# Patient Record
Sex: FEMALE | Race: WHITE | Employment: FULL TIME | URBAN - METROPOLITAN AREA
[De-identification: names, ages, dates, MRNs, and addresses within clinical notes are randomized per-mention and may not be internally consistent; named-entity substitution may affect disease eponyms.]

---

## 2017-06-05 ENCOUNTER — HOSPITAL ENCOUNTER (EMERGENCY)
Age: 42
Discharge: HOME | End: 2017-06-05
Attending: EMERGENCY MEDICINE
Payer: COMMERCIAL

## 2017-06-05 ENCOUNTER — APPOINTMENT (OUTPATIENT)
Dept: GENERAL RADIOLOGY | Age: 42
End: 2017-06-05
Attending: EMERGENCY MEDICINE
Payer: COMMERCIAL

## 2017-06-05 ENCOUNTER — APPOINTMENT (OUTPATIENT)
Dept: CT IMAGING | Age: 42
End: 2017-06-05
Attending: EMERGENCY MEDICINE
Payer: COMMERCIAL

## 2017-06-05 VITALS
BODY MASS INDEX: 29.88 KG/M2 | DIASTOLIC BLOOD PRESSURE: 68 MMHG | OXYGEN SATURATION: 99 % | TEMPERATURE: 98 F | SYSTOLIC BLOOD PRESSURE: 121 MMHG | HEIGHT: 64 IN | HEART RATE: 61 BPM | WEIGHT: 175 LBS | RESPIRATION RATE: 18 BRPM

## 2017-06-05 DIAGNOSIS — R06.02 SHORTNESS OF BREATH: Primary | ICD-10-CM

## 2017-06-05 LAB
ALBUMIN SERPL BCP-MCNC: 3.5 G/DL (ref 3.4–5)
ALBUMIN/GLOB SERPL: 1 {RATIO} (ref 0.8–1.7)
ALP SERPL-CCNC: 63 U/L (ref 45–117)
ALT SERPL-CCNC: 21 U/L (ref 13–56)
ANION GAP BLD CALC-SCNC: 7 MMOL/L (ref 3–18)
AST SERPL W P-5'-P-CCNC: 19 U/L (ref 15–37)
BASOPHILS # BLD AUTO: 0 K/UL (ref 0–0.06)
BASOPHILS # BLD: 0 % (ref 0–2)
BILIRUB SERPL-MCNC: 0.2 MG/DL (ref 0.2–1)
BUN SERPL-MCNC: 15 MG/DL (ref 7–18)
BUN/CREAT SERPL: 19 (ref 12–20)
CALCIUM SERPL-MCNC: 8.3 MG/DL (ref 8.5–10.1)
CHLORIDE SERPL-SCNC: 105 MMOL/L (ref 100–108)
CK MB CFR SERPL CALC: NORMAL % (ref 0–4)
CK MB SERPL-MCNC: <1 NG/ML (ref 5–25)
CK SERPL-CCNC: 98 U/L (ref 26–192)
CO2 SERPL-SCNC: 29 MMOL/L (ref 21–32)
CREAT SERPL-MCNC: 0.8 MG/DL (ref 0.6–1.3)
D DIMER PPP FEU-MCNC: 0.68 UG/ML(FEU)
DIFFERENTIAL METHOD BLD: NORMAL
EOSINOPHIL # BLD: 0.2 K/UL (ref 0–0.4)
EOSINOPHIL NFR BLD: 3 % (ref 0–5)
ERYTHROCYTE [DISTWIDTH] IN BLOOD BY AUTOMATED COUNT: 12.3 % (ref 11.6–14.5)
GLOBULIN SER CALC-MCNC: 3.5 G/DL (ref 2–4)
GLUCOSE SERPL-MCNC: 119 MG/DL (ref 74–99)
HCT VFR BLD AUTO: 36.5 % (ref 35–45)
HGB BLD-MCNC: 12.1 G/DL (ref 12–16)
LYMPHOCYTES # BLD AUTO: 32 % (ref 21–52)
LYMPHOCYTES # BLD: 2.7 K/UL (ref 0.9–3.6)
MCH RBC QN AUTO: 28.2 PG (ref 24–34)
MCHC RBC AUTO-ENTMCNC: 33.2 G/DL (ref 31–37)
MCV RBC AUTO: 85.1 FL (ref 74–97)
MONOCYTES # BLD: 0.5 K/UL (ref 0.05–1.2)
MONOCYTES NFR BLD AUTO: 6 % (ref 3–10)
NEUTS SEG # BLD: 4.9 K/UL (ref 1.8–8)
NEUTS SEG NFR BLD AUTO: 59 % (ref 40–73)
PLATELET # BLD AUTO: 253 K/UL (ref 135–420)
PMV BLD AUTO: 9.2 FL (ref 9.2–11.8)
POTASSIUM SERPL-SCNC: 3.9 MMOL/L (ref 3.5–5.5)
PROT SERPL-MCNC: 7 G/DL (ref 6.4–8.2)
RBC # BLD AUTO: 4.29 M/UL (ref 4.2–5.3)
SODIUM SERPL-SCNC: 141 MMOL/L (ref 136–145)
TROPONIN I SERPL-MCNC: <0.02 NG/ML (ref 0–0.06)
WBC # BLD AUTO: 8.4 K/UL (ref 4.6–13.2)

## 2017-06-05 PROCEDURE — 85025 COMPLETE CBC W/AUTO DIFF WBC: CPT | Performed by: EMERGENCY MEDICINE

## 2017-06-05 PROCEDURE — 96360 HYDRATION IV INFUSION INIT: CPT

## 2017-06-05 PROCEDURE — 93005 ELECTROCARDIOGRAM TRACING: CPT

## 2017-06-05 PROCEDURE — 82550 ASSAY OF CK (CPK): CPT | Performed by: EMERGENCY MEDICINE

## 2017-06-05 PROCEDURE — 99284 EMERGENCY DEPT VISIT MOD MDM: CPT

## 2017-06-05 PROCEDURE — 74011250636 HC RX REV CODE- 250/636: Performed by: EMERGENCY MEDICINE

## 2017-06-05 PROCEDURE — 71275 CT ANGIOGRAPHY CHEST: CPT

## 2017-06-05 PROCEDURE — 74011636320 HC RX REV CODE- 636/320: Performed by: EMERGENCY MEDICINE

## 2017-06-05 PROCEDURE — 71020 XR CHEST PA LAT: CPT

## 2017-06-05 PROCEDURE — 80053 COMPREHEN METABOLIC PANEL: CPT | Performed by: EMERGENCY MEDICINE

## 2017-06-05 PROCEDURE — 85379 FIBRIN DEGRADATION QUANT: CPT | Performed by: EMERGENCY MEDICINE

## 2017-06-05 RX ADMIN — SODIUM CHLORIDE 1000 ML: 900 INJECTION, SOLUTION INTRAVENOUS at 20:04

## 2017-06-05 RX ADMIN — IOPAMIDOL 100 ML: 755 INJECTION, SOLUTION INTRAVENOUS at 20:34

## 2017-06-05 NOTE — ED NOTES
Pt affect calm/conversant with no dsypnea noted; reports she just feels as though she cannot get enough air in.
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
## 2017-06-06 LAB
ATRIAL RATE: 56 BPM
CALCULATED P AXIS, ECG09: 43 DEGREES
CALCULATED R AXIS, ECG10: 13 DEGREES
CALCULATED T AXIS, ECG11: 35 DEGREES
DIAGNOSIS, 93000: NORMAL
P-R INTERVAL, ECG05: 148 MS
Q-T INTERVAL, ECG07: 454 MS
QRS DURATION, ECG06: 86 MS
QTC CALCULATION (BEZET), ECG08: 438 MS
VENTRICULAR RATE, ECG03: 56 BPM

## 2017-06-06 NOTE — DISCHARGE INSTRUCTIONS

## 2017-06-09 ENCOUNTER — OFFICE VISIT (OUTPATIENT)
Dept: FAMILY MEDICINE CLINIC | Age: 42
End: 2017-06-09

## 2017-06-09 VITALS
OXYGEN SATURATION: 98 % | BODY MASS INDEX: 32.1 KG/M2 | TEMPERATURE: 98 F | WEIGHT: 188 LBS | HEIGHT: 64 IN | HEART RATE: 86 BPM | SYSTOLIC BLOOD PRESSURE: 118 MMHG | RESPIRATION RATE: 16 BRPM | DIASTOLIC BLOOD PRESSURE: 84 MMHG

## 2017-06-09 DIAGNOSIS — F41.9 ANXIETY: ICD-10-CM

## 2017-06-09 DIAGNOSIS — M79.7 FIBROMYALGIA: ICD-10-CM

## 2017-06-09 DIAGNOSIS — R06.02 SHORTNESS OF BREATH AT REST: Primary | ICD-10-CM

## 2017-06-09 RX ORDER — DULOXETIN HYDROCHLORIDE 20 MG/1
20 CAPSULE, DELAYED RELEASE ORAL DAILY
Qty: 30 CAP | Refills: 5 | Status: SHIPPED | OUTPATIENT
Start: 2017-06-09 | End: 2017-06-15

## 2017-06-09 RX ORDER — ALPRAZOLAM 1 MG/1
1 TABLET ORAL
Qty: 30 TAB | Refills: 0 | Status: SHIPPED | OUTPATIENT
Start: 2017-06-09 | End: 2019-11-05

## 2017-06-09 NOTE — PROGRESS NOTES
1. Have you been to the ER, urgent care clinic since your last visit? Hospitalized since your last visit? Yes When: 06/08 [facility]    2. Have you seen or consulted any other health care providers outside of the [address] [place] [address] since your last visit? Include any pap smears or colon screening.  No

## 2017-06-09 NOTE — MR AVS SNAPSHOT
Visit Information Date & Time Provider Department Dept. Phone Encounter #  
 6/9/2017  3:00 PM Kylie Conway  [facility] [id number] Upcoming Health Maintenance Date Due  
 PAP AKA CERVICAL CYTOLOGY 4/11/2015 DTaP/Tdap/Td series (1 - Tdap) 7/3/2017* INFLUENZA AGE 9 TO ADULT 8/1/2017 *Topic was postponed. The date shown is not the original due date. Allergies as of 6/9/2017  Review Complete On: 6/9/2017 By: Kylie Conway NP Severity Noted Reaction Type Reactions Adderall [Dextroamphetamine-amphetamine]   Side Effect Other (comments) Sleepy  Headache sick to her stomach Current Immunizations  Never Reviewed No immunizations on file. Not reviewed this visit You Were Diagnosed With   
  
 Codes Comments Fibromyalgia    -  Primary ICD-10-CM: M79.7 ICD-9-CM: 729.1 Anxiety     ICD-10-CM: F41.9 ICD-9-CM: 300.00 Shortness of breath at rest     ICD-10-CM: R06.02 
ICD-9-CM: 786.05 Vitals BP Pulse Temp Resp Height(growth percentile) Weight(growth percentile) 118/84 (BP 1 Location: Left arm, BP Patient Position: Sitting) 86 98 °F (36.7 °C) (Oral) 16 5' 4\" (1.626 m) 188 lb (85.3 kg) LMP SpO2 BMI OB Status Smoking Status 05/29/2017 98% 32.27 kg/m2 Having regular periods Passive Smoke Exposure - Never Smoker BMI and BSA Data Body Mass Index Body Surface Area  
 32.27 kg/m 2 1.96 m 2 Preferred Pharmacy Pharmacy Name Phone [id number] Kaylan Alejo, [facility] [place], [place],[address] [facility] [phone] Your Updated Medication List  
  
   
This list is accurate as of: 6/9/17  3:59 PM.  Always use your most recent med list.  
  
  
  
  
 ALPRAZolam 1 mg tablet Commonly known as:  Pama Nate Take 1 Tab by mouth two (2) times daily as needed for Anxiety. Max Daily Amount: 2 mg.  
  
 buprenorphine-naloxone 8-2 mg Subl  
  
 dicyclomine 20 mg tablet Commonly known as:  BENTYL Take 2 Tabs by mouth four (4) times daily as needed (abdominal cramps). DULoxetine 20 mg capsule Commonly known as:  CYMBALTA Take 1 Cap by mouth daily. Mth-Me Blue-Sod Phos-PhSal-Hyo 118-10-40.8-36 mg Cap capsule Commonly known as:  Gardenia Euler Take 1 Cap by mouth four (4) times daily as needed. promethazine 25 mg tablet Commonly known as:  PHENERGAN Take 1-2 Tabs by mouth every six (6) hours as needed. Prescriptions Printed Refills ALPRAZolam (XANAX) 1 mg tablet 0 Sig: Take 1 Tab by mouth two (2) times daily as needed for Anxiety. Max Daily Amount: 2 mg. Class: Print Route: Oral  
  
Prescriptions Sent to Pharmacy Refills DULoxetine (CYMBALTA) 20 mg capsule 5 Sig: Take 1 Cap by mouth daily. Class: Normal  
 [facility] [address] [place] [address],[address] #: [phone] Route: Oral  
  
We Performed the Following REFERRAL TO CARDIOLOGY [JOA56 Custom] Comments:  
 Please evaluate patient for shortness of breath. Her work up in ER was negative. She has a strong family history of cardiac disease and a history of phentermine use. Thank you for seeing this patient. MILES Mcclellan Referral Information Referral ID Referred By Referred To  
  
 [id number] [facility] ([place], [place]) MD Alex Hanson [address] [facility] [place], [address] [place] [address] Str. Phone: [phone] Fax: [phone] Visits Status Start Date End Date 1 New Request 6/9/17 6/9/18 If your referral has a status of pending review or denied, additional information will be sent to support the outcome of this decision. Introducing [facility]! Dear Courtney John: 
Thank you for requesting a Sprout Social account. Our records indicate that you already have an active Sprout Social account.   You can access your account anytime at [email or web address] Did you know that you can access your hospital and ER discharge instructions at any time in GetMaid? You can also review all of your test results from your hospital stay or ER visit. Additional Information If you have questions, please visit the Frequently Asked Questions section of the GetMaid website at [email or web address] Remember, GetMaid is NOT to be used for urgent needs. For medical emergencies, dial 911. Now available from your iPhone and Android! Please provide this summary of care documentation to your next provider. Your primary care clinician is listed as NONE. If you have any questions after today's visit, please call [phone].

## 2017-06-09 NOTE — PROGRESS NOTES
HISTORY OF PRESENT ILLNESS  José Antonio Quispe is a 37 y.o. female. Pt presents today after ER follow up. She has had 5 days of shortness of breath. She states it is worse in the morning and just very slightly improves during the day. It is present with rest as well as exertion. She denies illness, fever or chills, no cold symptoms, no headache, no nausea or vomiting. She states there is a strong family history of cardiac disease. Shortness of Breath   The history is provided by the patient. This is a new problem. The current episode started more than 1 week ago. The problem has not changed since onset. Pertinent negatives include no fever, no headaches, no rhinorrhea, no sore throat, no swollen glands, no ear pain, no neck pain, no cough, no sputum production, no wheezing, no PND, no chest pain, no vomiting, no rash and no leg swelling. Review of Systems   Constitutional: Negative for chills and fever. HENT: Negative for ear pain, rhinorrhea and sore throat. Eyes: Negative. Respiratory: Positive for shortness of breath. Negative for cough, sputum production and wheezing. Cardiovascular: Negative for chest pain, leg swelling and PND. Gastrointestinal: Negative for vomiting. Musculoskeletal: Negative for neck pain. Skin: Negative for rash. Neurological: Negative. Negative for headaches. Psychiatric/Behavioral: The patient is nervous/anxious. Physical Exam   Constitutional: She is oriented to person, place, and time. She appears well-developed and well-nourished. No distress. HENT:   Head: Normocephalic. Right Ear: External ear normal.   Left Ear: External ear normal.   Nose: Nose normal.   Mouth/Throat: Oropharynx is clear and moist. No oropharyngeal exudate. Eyes: Conjunctivae are normal. Pupils are equal, round, and reactive to light. Neck: Neck supple. No thyromegaly present. Cardiovascular: Normal rate, regular rhythm and normal heart sounds.     No murmur heard.  Pulmonary/Chest: Effort normal and breath sounds normal. No respiratory distress. She has no wheezes. She has no rales. Musculoskeletal: Normal range of motion. She exhibits no edema. Lymphadenopathy:     She has no cervical adenopathy. Neurological: She is alert and oriented to person, place, and time. She has normal reflexes. No cranial nerve deficit. Psychiatric: She has a normal mood and affect. Her behavior is normal. Judgment and thought content normal.       ASSESSMENT and PLAN    ICD-10-CM ICD-9-CM    1. Fibromyalgia M79.7 729.1 DULoxetine (CYMBALTA) 20 mg capsule   2. Anxiety F41.9 300.00 ALPRAZolam (XANAX) 1 mg tablet   3. Shortness of breath at rest R06.02 786.05 REFERRAL TO CARDIOLOGY     PLAN:  We discussed her concerns about cardiac issues. We discussed her pain management and xanax. I explained that she should not be on both of these. I explained tolerance. Pt stated her pain management doctor will not prescribe the xanax to her any more. She takes it TID. She started at 0.25mg and is now at 1mg and states she does not feel it is as effective. I explained that she needs to ween off of this and switch to something else, we discussed treatment options and decided to try Cymbalta as this can treat her anxiety as well as her fibromyalgia. Pt will RTC in 3-4 weeks for med check on the Cymbalta. Pt given after care summary.

## 2017-06-12 ENCOUNTER — TELEPHONE (OUTPATIENT)
Dept: FAMILY MEDICINE CLINIC | Age: 42
End: 2017-06-12

## 2017-06-12 NOTE — TELEPHONE ENCOUNTER
Pt called in and said that she needs a note stating she is ok to return to work. 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 Please advise [phone].

## 2017-06-12 NOTE — LETTER
NOTIFICATION OF RETURN TO WORK / SCHOOL 
 
6/12/2017 10:11 AM 
 
Ms. Dylan Rudolph [address] [facility] [address] [place] To Whom It May Concern: 
 
Dylan Rudolph was under the care of [address] [place] [address] from 06/09/2017. She will be able to return to work/school on 06/12/2017 with no restrictions. If there are questions or concerns please have the patient contact our office. Sincerely, Trudi Diana NP

## 2017-06-13 NOTE — TELEPHONE ENCOUNTER
Patient returned call to office to discuss problems she is having with her medication Cymbalta. On night 1 she slept with no issues. Night 2 not sleeping as well and flushing. Night 3 no sleep complete flushing tremors, nausea. Please advise [phone].

## 2017-06-15 ENCOUNTER — OFFICE VISIT (OUTPATIENT)
Dept: CARDIOLOGY CLINIC | Age: 42
End: 2017-06-15

## 2017-06-15 VITALS
HEIGHT: 64 IN | DIASTOLIC BLOOD PRESSURE: 70 MMHG | BODY MASS INDEX: 32.44 KG/M2 | WEIGHT: 190 LBS | HEART RATE: 87 BPM | SYSTOLIC BLOOD PRESSURE: 120 MMHG | OXYGEN SATURATION: 98 %

## 2017-06-15 DIAGNOSIS — R06.02 SOB (SHORTNESS OF BREATH): Primary | ICD-10-CM

## 2017-06-15 DIAGNOSIS — R06.09 DOE (DYSPNEA ON EXERTION): ICD-10-CM

## 2017-06-15 DIAGNOSIS — Z82.49 FAMILY HISTORY OF EARLY CAD: ICD-10-CM

## 2017-06-15 NOTE — MR AVS SNAPSHOT
Visit Information Date & Time Provider Department Dept. Phone Encounter #  
 6/15/2017  8:20 AM Natalie Millard MD [facility] [id number] Your Appointments 6/30/2017  4:00 PM  
ROUTINE CARE with Carol Acevedo  [facility] Appt Note: 3 wk fu  
 [address] [address] 2 [place] [address] [address] Upcoming Health Maintenance Date Due  
 PAP AKA CERVICAL CYTOLOGY 4/11/2015 DTaP/Tdap/Td series (1 - Tdap) 7/3/2017* INFLUENZA AGE 9 TO ADULT 8/1/2017 *Topic was postponed. The date shown is not the original due date. Allergies as of 6/15/2017  Review Complete On: 6/9/2017 By: Carol Acevedo NP Severity Noted Reaction Type Reactions Adderall [Dextroamphetamine-amphetamine]   Side Effect Other (comments) Sleepy  Headache sick to her stomach Current Immunizations  Never Reviewed No immunizations on file. Not reviewed this visit You Were Diagnosed With   
  
 Codes Comments SOB (shortness of breath)    -  Primary ICD-10-CM: R06.02 
ICD-9-CM: 786.05 Vitals BP Pulse Height(growth percentile) Weight(growth percentile) LMP SpO2  
 120/70 87 5' 4\" (1.626 m) 190 lb (86.2 kg) 05/29/2017 98% BMI OB Status Smoking Status 32.61 kg/m2 Having regular periods Passive Smoke Exposure - Never Smoker Vitals History BMI and BSA Data Body Mass Index Body Surface Area  
 32.61 kg/m 2 1.97 m 2 Preferred Pharmacy Pharmacy Name Phone [id number] Vaughn Dang, [address],[address] [facility] [phone] Your Updated Medication List  
  
   
This list is accurate as of: 6/15/17  9:01 AM.  Always use your most recent med list.  
  
  
  
  
 ALPRAZolam 1 mg tablet Commonly known as:  Isai Fuse Take 1 Tab by mouth two (2) times daily as needed for Anxiety. Max Daily Amount: 2 mg. Mth-Me Blue-Sod Phos-PhSal-Hyo 118-10-40.8-36 mg Cap capsule Commonly known as:  Horald Flash Take 1 Cap by mouth four (4) times daily as needed. promethazine 25 mg tablet Commonly known as:  PHENERGAN Take 1-2 Tabs by mouth every six (6) hours as needed. To-Do List   
 06/15/2017 ECHO:  2D ECHO COMPLETE ADULT (TTE) W OR WO CONTR   
  
 06/15/2017 PFT:  PFT DLCO   
  
 06/15/2017 ECG:  STRESS TEST CARDIAC   
  
 06/22/2017 11:30 AM  
  Appointment with [facility]- CX50 MACHINE (WT ) at [facility] NON-INVASIVE CARD ([phone]) Age Limit for ALL Heart procedures @ all [facility] facilities: 18 yrs and older only. Under the age of 25, refer to [facility] ([phone]). Wt Limit: 350lbs. This study requires patient to bring a written physician's order or MD office may fax the order to Central Scheduling at [phone]. Patient needs to bring a current list of all medications. No preparation is required for this study. Patients should report 15 minutes prior to their appointment time to the [address].     
  
 06/22/2017 12:30 PM  
  Appointment with [facility] NUC CARD ROOM at [facility] NON-INVASIVE CARD ([phone]) Age Limit for ALL Heart procedures @ all [facility] facilities: 18 yrs and older only. Under the age of 25, refer to [facility] ([phone]). PATIENTS SHOULD NOT BRING CHILDREN UNATTENDED TO APPTS. WEIGHT LIMIT:  300 lbs for the treadmill portion of this study. 1-NPO and no caffeine for 4 hours prior to the test 2-No beta blockers or calcium channel blockers day of the test unless specified by cardiology. Please bring with. 3-Patient will be walking/running on a Treadmill.   Patient should wear comfortable shoes that are suitable for walking (NO Sandals/Flip Flops, High Heels, etc.) & comfortable clothing. Please report to the [facility], [address], [address], [place], [place].    
  
 06/29/2017 1:00 PM  
  Appointment with [address] [place] [address] 28 at [address] ([phone]) 1. Do NOT use any inhaled medications 24 hours prior to your breathing test.   2. Do NOT eat a heavy meal or smoke any tobacco products two hours prior to the appointment time. 3. Dress in loose, comfortable clothing. 4. Bring your photo ID, insurance cards and, if provided, the written physician order. 5. Report to the Patient Registration department on the first floor 15-20 minutes prior to your appointment time to check in.   6. If you have questions or need to reschedule, please call [phone]. Referral Information Referral ID Referred By Referred To  
  
 [id number] Delbert ESPARZA Not Available Visits Status Start Date End Date 1 New Request 6/15/17 6/15/18 If your referral has a status of pending review or denied, additional information will be sent to support the outcome of this decision. Introducing [facility] & [facility] SERVICES! Dear Teresa Christine: 
Thank you for requesting a TakeCharge account. Our records indicate that you already have an active TakeCharge account. You can access your account anytime at [email or web address] Did you know that you can access your hospital and ER discharge instructions at any time in TakeCharge? You can also review all of your test results from your hospital stay or ER visit. Additional Information If you have questions, please visit the Frequently Asked Questions section of the TakeCharge website at [email or web address] Remember, TakeCharge is NOT to be used for urgent needs. For medical emergencies, dial 911. Now available from your iPhone and Android! Please provide this summary of care documentation to your next provider. Your primary care clinician is listed as Ga Posada. If you have any questions after today's visit, please call [phone].

## 2017-06-15 NOTE — PROGRESS NOTES
HISTORY OF PRESENT ILLNESS  Susie Mcardle is a 37 y.o. female. HPI    Patient presents for a new office visit. She does not have a prior cardiac history. She does have a strong family history for premature coronary disease in the male family members. The patient was seen in the emergency room 10 days ago for new onset shortness of breath which occurred with physical activity and also occurred early in the morning shortly after she awoke. She underwent a CTA of her thorax which was essentially normal.  She also underwent an EKG and blood work all of which was normal.  She was then seen by her PCP last week who referred here here for further evaluation. Patient denies any overt chest pain or chest pressure. She states her shortness of breath has not significantly changed over the past 10 days, but is still present. She has not noted any orthopnea, PND, or leg swelling. No palpitations, no dizziness, no carie syncope or near syncope. She states that her activity level has been less this year than it was a year ago. Past Medical History:   Diagnosis Date    ADD (attention deficit disorder with hyperactivity)     Anxiety     Colon polyps     Endometriosis     Fibromyalgia     Gall stones     GERD (gastroesophageal reflux disease)     Hx of LASIK     Pancreatitis     S/P endometrial ablation 11/2015    Urinary tract infection, recurrent      Current Outpatient Prescriptions   Medication Sig Dispense Refill    ALPRAZolam (XANAX) 1 mg tablet Take 1 Tab by mouth two (2) times daily as needed for Anxiety. Max Daily Amount: 2 mg. 30 Tab 0    Mth-Me Blue-Sod Phos-PhSal-Hyo (URIBEL) 118-10-40.8-36 mg cap capsule Take 1 Cap by mouth four (4) times daily as needed. 60 Cap 4    promethazine (PHENERGAN) 25 mg tablet Take 1-2 Tabs by mouth every six (6) hours as needed.  20 Tab 0       Allergies   Allergen Reactions    Adderall [Dextroamphetamine-Amphetamine] Other (comments)     Sleepy  Headache sick to her stomach      Social History   Substance Use Topics    Smoking status: Passive Smoke Exposure - Never Smoker    Smokeless tobacco: None    Alcohol use 0.0 oz/week     0 Standard drinks or equivalent per week      Comment: rarely     Family History   Problem Relation Age of Onset    Diabetes Father     Diabetes Mother      prediabetes-diet control    Hypertension Mother      diet controlled    Colon Polyps Paternal Grandmother     Colon Polyps Maternal Grandmother     Heart Attack Maternal Grandfather          Heart Disease Maternal Uncle     Thyroid Disease Maternal Grandmother          Review of Systems   Constitutional: Negative for chills, fever and weight loss. HENT: Negative for nosebleeds. Eyes: Negative for blurred vision and double vision. Respiratory: Positive for shortness of breath. Negative for cough and wheezing. Cardiovascular: Negative for chest pain, palpitations, orthopnea, claudication, leg swelling and PND. Gastrointestinal: Negative for abdominal pain, heartburn, nausea and vomiting. Genitourinary: Negative for dysuria and hematuria. Musculoskeletal: Negative for falls and myalgias. Skin: Negative for rash. Neurological: Negative for dizziness, focal weakness and headaches. Endo/Heme/Allergies: Does not bruise/bleed easily. Psychiatric/Behavioral: Negative for substance abuse. Visit Vitals    /70    Pulse 87    Ht 5' 4\" (1.626 m)    Wt 86.2 kg (190 lb)    LMP 2017    SpO2 98%    BMI 32.61 kg/m2       Physical Exam   Constitutional: She is oriented to person, place, and time. She appears well-developed and well-nourished. HENT:   Head: Normocephalic and atraumatic. Eyes: Conjunctivae are normal.   Neck: Neck supple. No JVD present. Carotid bruit is not present. Cardiovascular: Normal rate, regular rhythm, S1 normal, S2 normal and normal pulses. Exam reveals no gallop. No murmur heard.   Pulmonary/Chest: Effort normal and breath sounds normal. She has no wheezes. She has no rales. Abdominal: Soft. Bowel sounds are normal. There is no tenderness. Musculoskeletal: She exhibits no edema, tenderness or deformity. Neurological: She is alert and oriented to person, place, and time. Skin: Skin is warm and dry. Psychiatric: She has a normal mood and affect. Her behavior is normal. Thought content normal.       ASSESSMENT and PLAN    Dyspnea. Since there is an exertional component, this may be cardiac related given her strong family history for premature CAD. Because of this I recommended an echocardiogram to rule out structural heart disease and also an exercise treadmill stress test for further risk stratification. Lastly, I have recommended pulmonary function testing. If her cardiac testing is unremarkable, no further cardiac workup needed.

## 2017-06-15 NOTE — PROGRESS NOTES
1. Have you been to the ER, urgent care clinic since your last visit? Hospitalized since your last visit? Yes, 6/5/17 for sob     2. Have you seen or consulted any other health care providers outside of the [address] [place] [address] since your last visit? Include any pap smears or colon screening.   No

## 2017-06-16 NOTE — TELEPHONE ENCOUNTER
Pt called back to the office to ask if STEPHEN Nation had sent over another rx for her. Stated that she was advised it might take the trial of a couple different rx to find the right fit for her.    Please advise

## 2017-06-20 ENCOUNTER — TELEPHONE (OUTPATIENT)
Dept: FAMILY MEDICINE CLINIC | Age: 42
End: 2017-06-20

## 2017-06-20 RX ORDER — FLUOXETINE 10 MG/1
10 CAPSULE ORAL DAILY
Qty: 90 CAP | Refills: 1 | Status: SHIPPED | OUTPATIENT
Start: 2017-06-20 | End: 2017-08-09

## 2017-06-20 NOTE — TELEPHONE ENCOUNTER
LMOM stating that avila sent a prescription to pharmacy and to take it at night. I informed her to call and make an appt for 4 weeks.

## 2017-06-22 ENCOUNTER — HOSPITAL ENCOUNTER (OUTPATIENT)
Dept: NON INVASIVE DIAGNOSTICS | Age: 42
Discharge: HOME | End: 2017-06-22
Attending: INTERNAL MEDICINE
Payer: COMMERCIAL

## 2017-06-22 DIAGNOSIS — R06.02 SOB (SHORTNESS OF BREATH): ICD-10-CM

## 2017-06-22 PROCEDURE — 93017 CV STRESS TEST TRACING ONLY: CPT

## 2017-06-22 PROCEDURE — 93306 TTE W/DOPPLER COMPLETE: CPT

## 2017-06-22 NOTE — PROCEDURES
[facility]  STRESS TEST (TREADMILL)    Name:  Catarino Padilla  MR#:  [id number]  :  1975  Account #:  [de-identified]  Date of Adm:  2017  Date of Service:  2017      PROCEDURE: Treadmill exercise stress test.    INDICATIONS: Chest pain and dyspnea. BASELINE ELECTROCARDIOGRAM: Sinus rhythm, normal EKG. PROTOCOL: The patient exercised according to Yohannes protocol for 9  minutes 0 seconds, achieving a work level of 10.1 METS. Resting  heart rate of 80 beats per minute increased to 164 beats per minute,  which is 91% of maximum age-predicted heart rate. Target heart rate  was achieved. Resting blood pressure 120/88 mmHg, increased to  150/80 mmHg. The patient did not have chest pain. ELECTROCARDIOGRAM FINDINGS: No arrhythmias. No changes to  suggest ischemia. IMPRESSION  1. Low risk exercise treadmill stress test.  2. The patient exercised 9 minutes without chest pain, reaching target  heart rate. 3. No electrocardiogram evidence of ischemia. 4. No arrhythmias.         STEVEN Shaver / Napoleon  D:  2017   13:03  T:  2017   14:58  Job #:  [id number]

## 2017-06-23 LAB
ATTENDING PHYSICIAN, CST07: NORMAL
DIAGNOSIS, 93000: NORMAL
DUKE TM SCORE RESULT, CST14: NORMAL
DUKE TREADMILL SCORE, CST13: NORMAL
ECG INTERP BEFORE EX, CST11: NORMAL
ECG INTERP DURING EX, CST12: NORMAL
FUNCTIONAL CAPACITY, CST17: NORMAL
KNOWN CARDIAC CONDITION, CST08: NORMAL
MAX. DIASTOLIC BP, CST04: 80 MMHG
MAX. HEART RATE, CST05: 164 BPM
MAX. SYSTOLIC BP, CST03: 150 MMHG
OVERALL BP RESPONSE TO EXERCISE, CST16: NORMAL
OVERALL HR RESPONSE TO EXERCISE, CST15: NORMAL
PEAK EX METS, CST10: 10.1 METS
PROTOCOL NAME, CST01: NORMAL
TEST INDICATION, CST09: NORMAL

## 2017-06-25 ENCOUNTER — HOSPITAL ENCOUNTER (EMERGENCY)
Age: 42
Discharge: HOME | End: 2017-06-25
Attending: EMERGENCY MEDICINE | Admitting: EMERGENCY MEDICINE
Payer: COMMERCIAL

## 2017-06-25 VITALS
RESPIRATION RATE: 16 BRPM | DIASTOLIC BLOOD PRESSURE: 91 MMHG | HEIGHT: 64 IN | SYSTOLIC BLOOD PRESSURE: 140 MMHG | WEIGHT: 188 LBS | TEMPERATURE: 98 F | HEART RATE: 78 BPM | BODY MASS INDEX: 32.1 KG/M2 | OXYGEN SATURATION: 99 %

## 2017-06-25 DIAGNOSIS — R31.9 URINARY TRACT INFECTION WITH HEMATURIA, SITE UNSPECIFIED: Primary | ICD-10-CM

## 2017-06-25 DIAGNOSIS — N39.0 URINARY TRACT INFECTION WITH HEMATURIA, SITE UNSPECIFIED: Primary | ICD-10-CM

## 2017-06-25 LAB
APPEARANCE UR: ABNORMAL
BACTERIA URNS QL MICRO: ABNORMAL /HPF
BILIRUB UR QL: NEGATIVE
COLOR UR: ABNORMAL
EPITH CASTS URNS QL MICRO: ABNORMAL /LPF (ref 0–5)
GLUCOSE UR STRIP.AUTO-MCNC: NEGATIVE MG/DL
HCG UR QL: NEGATIVE
HGB UR QL STRIP: ABNORMAL
KETONES UR QL STRIP.AUTO: NEGATIVE MG/DL
LEUKOCYTE ESTERASE UR QL STRIP.AUTO: ABNORMAL
NITRITE UR QL STRIP.AUTO: NEGATIVE
PH UR STRIP: 7 [PH] (ref 5–8)
PROT UR STRIP-MCNC: 30 MG/DL
RBC #/AREA URNS HPF: ABNORMAL /HPF (ref 0–5)
SERVICE CMNT-IMP: NORMAL
SP GR UR REFRACTOMETRY: 1 (ref 1–1.03)
UROBILINOGEN UR QL STRIP.AUTO: 0.2 EU/DL (ref 0.2–1)
WBC URNS QL MICRO: ABNORMAL /HPF (ref 0–4)
WET PREP GENITAL: NORMAL

## 2017-06-25 PROCEDURE — 87210 SMEAR WET MOUNT SALINE/INK: CPT | Performed by: EMERGENCY MEDICINE

## 2017-06-25 PROCEDURE — 81025 URINE PREGNANCY TEST: CPT | Performed by: EMERGENCY MEDICINE

## 2017-06-25 PROCEDURE — 87491 CHLMYD TRACH DNA AMP PROBE: CPT | Performed by: EMERGENCY MEDICINE

## 2017-06-25 PROCEDURE — 99284 EMERGENCY DEPT VISIT MOD MDM: CPT

## 2017-06-25 PROCEDURE — 81001 URINALYSIS AUTO W/SCOPE: CPT | Performed by: EMERGENCY MEDICINE

## 2017-06-25 RX ORDER — CIPROFLOXACIN 500 MG/1
500 TABLET ORAL 2 TIMES DAILY
Qty: 14 TAB | Refills: 0 | Status: SHIPPED | OUTPATIENT
Start: 2017-06-25 | End: 2017-07-02

## 2017-06-25 RX ORDER — PROMETHAZINE HYDROCHLORIDE 25 MG/1
25 TABLET ORAL
Qty: 12 TAB | Refills: 0 | Status: SHIPPED | OUTPATIENT
Start: 2017-06-25 | End: 2017-08-24

## 2017-06-25 NOTE — ED TRIAGE NOTES
Reports waking up this morning with pain w urination and vaginal irritation. Denies vaginal discharge.   States she took 1 cipro she had at home PTA

## 2017-06-25 NOTE — DISCHARGE INSTRUCTIONS
Urinary Tract Infection in Women: Care Instructions  Your Care Instructions    A urinary tract infection, or UTI, is a general term for an infection anywhere between the kidneys and the urethra (where urine comes out). Most UTIs are bladder infections. They often cause pain or burning when you urinate. UTIs are caused by bacteria and can be cured with antibiotics. Be sure to complete your treatment so that the infection goes away. Follow-up care is a key part of your treatment and safety. Be sure to make and go to all appointments, and call your doctor if you are having problems. It's also a good idea to know your test results and keep a list of the medicines you take. How can you care for yourself at home? · Take your antibiotics as directed. Do not stop taking them just because you feel better. You need to take the full course of antibiotics. · Drink extra water and other fluids for the next day or two. This may help wash out the bacteria that are causing the infection. (If you have kidney, heart, or liver disease and have to limit fluids, talk with your doctor before you increase your fluid intake.)  · Avoid drinks that are carbonated or have caffeine. They can irritate the bladder. · Urinate often. Try to empty your bladder each time. · To relieve pain, take a hot bath or lay a heating pad set on low over your lower belly or genital area. Never go to sleep with a heating pad in place. To prevent UTIs  · Drink plenty of water each day. This helps you urinate often, which clears bacteria from your system. (If you have kidney, heart, or liver disease and have to limit fluids, talk with your doctor before you increase your fluid intake.)  · Urinate when you need to. · Urinate right after you have sex. · Change sanitary pads often. · Avoid douches, bubble baths, feminine hygiene sprays, and other feminine hygiene products that have deodorants.   · After going to the bathroom, wipe from front to back.  When should you call for help? Call your doctor now or seek immediate medical care if:  · Symptoms such as fever, chills, nausea, or vomiting get worse or appear for the first time. · You have new pain in your back just below your rib cage. This is called flank pain. · There is new blood or pus in your urine. · You have any problems with your antibiotic medicine. Watch closely for changes in your health, and be sure to contact your doctor if:  · You are not getting better after taking an antibiotic for 2 days. · Your symptoms go away but then come back. Where can you learn more? Go to http://marita-venecia.info/. Enter J935 in the search box to learn more about \"Urinary Tract Infection in Women: Care Instructions. \"  Current as of: November 28, 2016  Content Version: 11.3  © 9114-4501 [employer]. Care instructions adapted under license by [employer] (which disclaims liability or warranty for this information). If you have questions about a medical condition or this instruction, always ask your healthcare professional. [place] [address] any warranty or liability for your use of this information. Blood in the Urine: Care Instructions  Your Care Instructions  Blood in the urine, or hematuria, may make the urine look red, brown, or pink. There may be blood every time you urinate or just from time to time. You cannot always see blood in the urine, but it will show up in a urine test.  Blood in the urine may be serious. It should always be checked by a doctor. Your doctor may recommend more tests, including an X-ray, a CT scan, or a cystoscopy (which lets a doctor look inside the urethra and bladder). Blood in the urine can be a sign of another problem. Common causes are bladder infections and kidney stones. An injury to your groin or your genital area can also cause bleeding in the urinary tract.  Very hard exercise--such as running a marathon--can cause blood in the urine. Blood in the urine can also be a sign of kidney disease or cancer in the bladder or kidney. Many cases of blood in the urine are caused by a harmless condition that runs in families. This is called benign familial hematuria. It does not need any treatment. Sometimes your urine may look red or brown even though it does not contain blood. For example, not getting enough fluids (dehydration), taking certain medicines, or having a liver problem can change the color of your urine. Eating foods such as beets, rhubarb, or blackberries or foods with red food coloring can make your urine look red or pink. Follow-up care is a key part of your treatment and safety. Be sure to make and go to all appointments, and call your doctor if you are having problems. It's also a good idea to know your test results and keep a list of the medicines you take. When should you call for help? Call your doctor now or seek immediate medical care if:  · You have symptoms of a urinary infection. For example:  ¨ You have pus in your urine. ¨ You have pain in your back just below your rib cage. This is called flank pain. ¨ You have a fever, chills, or body aches. ¨ It hurts to urinate. ¨ You have groin or belly pain. · You have more blood in your urine. Watch closely for changes in your health, and be sure to contact your doctor if:  · You have new urination problems. · You do not get better as expected. Where can you learn more? Go to http://marita-venecia.info/. Enter R261 in the search box to learn more about \"Blood in the Urine: Care Instructions. \"  Current as of: March 20, 2017  Content Version: 11.3  © 6270-1703 [employer]. Care instructions adapted under license by [employer] (which disclaims liability or warranty for this information).  If you have questions about a medical condition or this instruction, always ask your healthcare professional. [employer] disclaims any warranty or liability for your use of this information.

## 2017-06-25 NOTE — ED NOTES
Kateryna Lincoln is a 37 y.o. female that was discharged in stable condition. The patients diagnosis, condition and treatment were explained to  patient and aftercare instructions were given. The patient verbalized understanding. Patient armband removed and shredded.
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
## 2017-06-26 LAB
C TRACH RRNA SPEC QL NAA+PROBE: NEGATIVE
N GONORRHOEA RRNA SPEC QL NAA+PROBE: NEGATIVE
SPECIMEN SOURCE: NORMAL

## 2017-06-27 NOTE — PROGRESS NOTES
Per last note: Dyspnea. Since there is an exertional component, this may be cardiac related given her strong family history for premature CAD. Because of this I recommended an echocardiogram to rule out structural heart disease and also an exercise treadmill stress test for further risk stratification. Lastly, I have recommended pulmonary function testing. If her cardiac testing is unremarkable, no further cardiac workup needed.

## 2017-07-07 ENCOUNTER — TELEPHONE (OUTPATIENT)
Dept: CARDIOLOGY CLINIC | Age: 42
End: 2017-07-07

## 2017-07-07 NOTE — TELEPHONE ENCOUNTER
----- Message from Mirza Salazar MD sent at 6/29/2017  4:53 PM EDT -----  Please let the patient know that both her stress test and echocardiogram were normal  ----- Message -----     From: Hortencia Bray LPN     Sent: 4/60/3301   1:31 PM       To: Mirza Salazar MD    Per last note: Dyspnea. Since there is an exertional component, this may be cardiac related given her strong family history for premature CAD. Because of this I recommended an echocardiogram to rule out structural heart disease and also an exercise treadmill stress test for further risk stratification. Lastly, I have recommended pulmonary function testing. If her cardiac testing is unremarkable, no further cardiac workup needed.

## 2017-08-06 ENCOUNTER — HOSPITAL ENCOUNTER (INPATIENT)
Age: 42
LOS: 2 days | Discharge: HOME | DRG: 880 | End: 2017-08-09
Attending: EMERGENCY MEDICINE | Admitting: STUDENT IN AN ORGANIZED HEALTH CARE EDUCATION/TRAINING PROGRAM
Payer: COMMERCIAL

## 2017-08-06 DIAGNOSIS — F10.24 ALCOHOL-INDUCED DEPRESSIVE DISORDER WITH MODERATE OR SEVERE USE DISORDER (HCC): Primary | ICD-10-CM

## 2017-08-06 LAB
ALBUMIN SERPL BCP-MCNC: 4.2 G/DL (ref 3.4–5)
ALBUMIN/GLOB SERPL: 0.9 {RATIO} (ref 0.8–1.7)
ALP SERPL-CCNC: 80 U/L (ref 45–117)
ALT SERPL-CCNC: 33 U/L (ref 13–56)
AMPHET UR QL SCN: NEGATIVE
ANION GAP BLD CALC-SCNC: 13 MMOL/L (ref 3–18)
APPEARANCE UR: CLEAR
AST SERPL W P-5'-P-CCNC: 57 U/L (ref 15–37)
BACTERIA URNS QL MICRO: ABNORMAL /HPF
BARBITURATES UR QL SCN: NEGATIVE
BASOPHILS # BLD AUTO: 0 K/UL (ref 0–0.1)
BASOPHILS # BLD: 0 % (ref 0–2)
BENZODIAZ UR QL: POSITIVE
BILIRUB SERPL-MCNC: 0.8 MG/DL (ref 0.2–1)
BILIRUB UR QL: NEGATIVE
BUN SERPL-MCNC: 7 MG/DL (ref 7–18)
BUN/CREAT SERPL: 8 (ref 12–20)
CALCIUM SERPL-MCNC: 8.3 MG/DL (ref 8.5–10.1)
CANNABINOIDS UR QL SCN: NEGATIVE
CHLORIDE SERPL-SCNC: 98 MMOL/L (ref 100–108)
CO2 SERPL-SCNC: 22 MMOL/L (ref 21–32)
COCAINE UR QL SCN: NEGATIVE
COLOR UR: YELLOW
CREAT SERPL-MCNC: 0.83 MG/DL (ref 0.6–1.3)
DIFFERENTIAL METHOD BLD: ABNORMAL
EOSINOPHIL # BLD: 0 K/UL (ref 0–0.4)
EOSINOPHIL NFR BLD: 0 % (ref 0–5)
EPITH CASTS URNS QL MICRO: ABNORMAL /LPF (ref 0–5)
ERYTHROCYTE [DISTWIDTH] IN BLOOD BY AUTOMATED COUNT: 13.9 % (ref 11.6–14.5)
ETHANOL SERPL-MCNC: <3 MG/DL (ref 0–3)
GLOBULIN SER CALC-MCNC: 4.6 G/DL (ref 2–4)
GLUCOSE SERPL-MCNC: 113 MG/DL (ref 74–99)
GLUCOSE UR STRIP.AUTO-MCNC: NEGATIVE MG/DL
HCT VFR BLD AUTO: 40.4 % (ref 35–45)
HDSCOM,HDSCOM: ABNORMAL
HGB BLD-MCNC: 14.4 G/DL (ref 12–16)
HGB UR QL STRIP: ABNORMAL
KETONES UR QL STRIP.AUTO: NEGATIVE MG/DL
LEUKOCYTE ESTERASE UR QL STRIP.AUTO: NEGATIVE
LIPASE SERPL-CCNC: 102 U/L (ref 73–393)
LYMPHOCYTES # BLD AUTO: 15 % (ref 21–52)
LYMPHOCYTES # BLD: 1.5 K/UL (ref 0.9–3.6)
MAGNESIUM SERPL-MCNC: 1.5 MG/DL (ref 1.6–2.6)
MCH RBC QN AUTO: 30.2 PG (ref 24–34)
MCHC RBC AUTO-ENTMCNC: 35.6 G/DL (ref 31–37)
MCV RBC AUTO: 84.7 FL (ref 74–97)
METHADONE UR QL: NEGATIVE
MONOCYTES # BLD: 0.6 K/UL (ref 0.05–1.2)
MONOCYTES NFR BLD AUTO: 6 % (ref 3–10)
MUCOUS THREADS URNS QL MICRO: ABNORMAL /LPF
NEUTS SEG # BLD: 7.9 K/UL (ref 1.8–8)
NEUTS SEG NFR BLD AUTO: 79 % (ref 40–73)
NITRITE UR QL STRIP.AUTO: NEGATIVE
OPIATES UR QL: NEGATIVE
PCP UR QL: NEGATIVE
PH UR STRIP: 6.5 [PH] (ref 5–8)
PLATELET # BLD AUTO: 44 K/UL (ref 135–420)
PLATELET COMMENTS,PCOM: ABNORMAL
PMV BLD AUTO: 10.7 FL (ref 9.2–11.8)
POTASSIUM SERPL-SCNC: 3.7 MMOL/L (ref 3.5–5.5)
PROT SERPL-MCNC: 8.8 G/DL (ref 6.4–8.2)
PROT UR STRIP-MCNC: 300 MG/DL
RBC # BLD AUTO: 4.77 M/UL (ref 4.2–5.3)
RBC #/AREA URNS HPF: ABNORMAL /HPF (ref 0–5)
SODIUM SERPL-SCNC: 133 MMOL/L (ref 136–145)
SP GR UR REFRACTOMETRY: 1.01 (ref 1–1.03)
UROBILINOGEN UR QL STRIP.AUTO: 0.2 EU/DL (ref 0.2–1)
WBC # BLD AUTO: 10 K/UL (ref 4.6–13.2)
WBC URNS QL MICRO: ABNORMAL /HPF (ref 0–4)

## 2017-08-06 PROCEDURE — 99284 EMERGENCY DEPT VISIT MOD MDM: CPT

## 2017-08-06 PROCEDURE — 74011250636 HC RX REV CODE- 250/636: Performed by: EMERGENCY MEDICINE

## 2017-08-06 PROCEDURE — 83735 ASSAY OF MAGNESIUM: CPT | Performed by: EMERGENCY MEDICINE

## 2017-08-06 PROCEDURE — 80053 COMPREHEN METABOLIC PANEL: CPT | Performed by: EMERGENCY MEDICINE

## 2017-08-06 PROCEDURE — 80307 DRUG TEST PRSMV CHEM ANLYZR: CPT

## 2017-08-06 PROCEDURE — 85025 COMPLETE CBC W/AUTO DIFF WBC: CPT | Performed by: EMERGENCY MEDICINE

## 2017-08-06 PROCEDURE — 74011250637 HC RX REV CODE- 250/637: Performed by: EMERGENCY MEDICINE

## 2017-08-06 PROCEDURE — 83690 ASSAY OF LIPASE: CPT | Performed by: EMERGENCY MEDICINE

## 2017-08-06 PROCEDURE — 80307 DRUG TEST PRSMV CHEM ANLYZR: CPT | Performed by: EMERGENCY MEDICINE

## 2017-08-06 PROCEDURE — 81001 URINALYSIS AUTO W/SCOPE: CPT

## 2017-08-06 PROCEDURE — 99285 EMERGENCY DEPT VISIT HI MDM: CPT

## 2017-08-06 RX ORDER — CHLORDIAZEPOXIDE HYDROCHLORIDE 25 MG/1
25 CAPSULE, GELATIN COATED ORAL
Status: COMPLETED | OUTPATIENT
Start: 2017-08-06 | End: 2017-08-06

## 2017-08-06 RX ORDER — LORAZEPAM 2 MG/ML
0.5 INJECTION INTRAMUSCULAR
Status: COMPLETED | OUTPATIENT
Start: 2017-08-06 | End: 2017-08-06

## 2017-08-06 RX ORDER — LORAZEPAM 2 MG/ML
1 INJECTION INTRAMUSCULAR
Status: COMPLETED | OUTPATIENT
Start: 2017-08-06 | End: 2017-08-06

## 2017-08-06 RX ORDER — ONDANSETRON 4 MG/1
4 TABLET, ORALLY DISINTEGRATING ORAL
Status: COMPLETED | OUTPATIENT
Start: 2017-08-06 | End: 2017-08-06

## 2017-08-06 RX ORDER — ONDANSETRON 2 MG/ML
4 INJECTION INTRAMUSCULAR; INTRAVENOUS
Status: COMPLETED | OUTPATIENT
Start: 2017-08-06 | End: 2017-08-06

## 2017-08-06 RX ADMIN — SODIUM CHLORIDE 1000 ML: 900 INJECTION, SOLUTION INTRAVENOUS at 15:40

## 2017-08-06 RX ADMIN — ONDANSETRON 4 MG: 4 TABLET, ORALLY DISINTEGRATING ORAL at 16:22

## 2017-08-06 RX ADMIN — ONDANSETRON 4 MG: 2 INJECTION INTRAMUSCULAR; INTRAVENOUS at 19:45

## 2017-08-06 RX ADMIN — LORAZEPAM 1 MG: 2 INJECTION INTRAMUSCULAR; INTRAVENOUS at 19:45

## 2017-08-06 RX ADMIN — LORAZEPAM 0.5 MG: 2 INJECTION, SOLUTION INTRAMUSCULAR; INTRAVENOUS at 15:40

## 2017-08-06 RX ADMIN — CHLORDIAZEPOXIDE HYDROCHLORIDE 25 MG: 25 CAPSULE ORAL at 17:29

## 2017-08-06 RX ADMIN — LORAZEPAM 1 MG: 2 INJECTION INTRAMUSCULAR; INTRAVENOUS at 21:26

## 2017-08-06 NOTE — IP AVS SNAPSHOT
[address] [place] [address] [place] [place] [address] Patient: Fadumo Sosa MRN: [id number] :1975 You are allergic to the following Allergen Reactions Adderall (Dextroamphetamine-Amphetamine) Other (comments) Sleepy  Headache sick to her stomach Recent Documentation Height Weight BMI OB Status Smoking Status 1.727 m 81.6 kg 27.37 kg/m2 Having regular periods Passive Smoke Exposure - Never Smoker Emergency Contacts Name Discharge Info Relation Home Work Mobile Nolberto Castellano CAREGIVER [4] Spouse [3] [phone] Akila Jennings  Spouse [3] [phone] About your hospitalization You were admitted on:  2017 You last received care in the:  [facility] ADULT CHEM DEP You were discharged on:  2017 Unit phone number:  [phone] Why you were hospitalized Your primary diagnosis was:  Not on File Your diagnoses also included:  Alcohol-Induced Depressive Disorder With Moderate Or Severe Use Disorder (Hcc) Providers Seen During Your Hospitalizations Provider Role Specialty Primary office phone Ava Perez MD Attending Provider Emergency Medicine [phone] Martin Lopes MD Attending Provider Emergency Medicine [phone] Karlee Goetz MD Attending Provider Emergency Medicine [phone] Gricel Stovall MD Attending Provider Psychiatry [phone] Your Primary Care Physician (PCP) Primary Care Physician Office Phone Office Fax [address] [facility] [phone] Follow-up Information Follow up With Details Comments Contact Info Noman Willson NP   16 [facility] [address] [id number] [phone] [address] [facility]. On 2017 at 8:45am with Alan Tinajero. for therapy.  Medication appointment will be made after therapy appointment. , As needed [address] [place] [id number] 
[phone] Current Discharge Medication List  
  
START taking these medications Dose & Instructions Dispensing Information Comments Morning Noon Evening Bedtime  
 nortriptyline 10 mg capsule Commonly known as:  PAMELOR Your last dose was: Your next dose is:    
   
   
 Dose:  10 mg Take 1 Cap by mouth nightly. Indications: generalized anxiety disorder Quantity:  30 Cap Refills:  0 CONTINUE these medications which have CHANGED Dose & Instructions Dispensing Information Comments Morning Noon Evening Bedtime ALPRAZolam 1 mg tablet Commonly known as:  Karthik Hawley What changed:  when to take this Your last dose was: Your next dose is:    
   
   
 Dose:  1 mg Take 1 Tab by mouth two (2) times daily as needed for Anxiety. Max Daily Amount: 2 mg. Quantity:  30 Tab Refills:  0 CONTINUE these medications which have NOT CHANGED Dose & Instructions Dispensing Information Comments Morning Noon Evening Bedtime  
 buprenorphine-naloxone 8-2 mg Subl Your last dose was: Your next dose is:    
   
   
 Dose:  0.75 Tab  
0.75 Tabs by SubLINGual route daily. Refills:  0 Mth-Me Blue-Sod Phos-PhSal-Hyo 118-10-40.8-36 mg Cap capsule Commonly known as:  Janice Cox Your last dose was: Your next dose is:    
   
   
 Dose:  1 Cap Take 1 Cap by mouth four (4) times daily as needed. Quantity:  60 Cap Refills:  4  
     
   
   
   
  
 promethazine 25 mg tablet Commonly known as:  PHENERGAN Your last dose was: Your next dose is:    
   
   
 Dose:  25 mg Take 1 Tab by mouth every six (6) hours as needed. Indications: nausea, vomiting Quantity:  12 Tab Refills:  0 STOP taking these medications FLUoxetine 10 mg capsule Commonly known as:  PROzac Where to Get Your Medications Information on where to get these meds will be given to you by the nurse or doctor. ! Ask your nurse or doctor about these medications  
  nortriptyline 10 mg capsule Discharge Instructions BEHAVIORAL HEALTH NURSING DISCHARGE NOTE Emergency Numbers [facility] Desk: [phone] [facility] Emergency Services: [phone] Suicide Prevention Line: [phone] (TALK) The following personal items collected during your admission are returned to you:  
Dental Appliance: Dental Appliances: None Vision: Visual Aid: None Hearing Aid:   
Jewelry: Jewelry: Bracelet, Earrings, Ring, With patient (wedding rings and earrings in possession) Clothing: Clothing: Footwear, Pajamas, Pants, Shirt, Shorts, Undergarments, With patient (2pr.jeans,5shirts,underwear,2pj.,sneakers,sandals) Other Valuables: Other Valuables: Cell Phone, Personal toiletries, Purse, Other (comment) (adapter in locker, bracelet and earings inside black purse) Valuables sent to safe: Personal Items Sent to Safe:  ($19.20,[employer] cards, drivers license) The discharge information has been reviewed with the patient. The patient verbalized understanding. [employer] Activation Thank you for requesting access to [employer]. Please follow the instructions below to securely access and download your online medical record. [employer] allows you to send messages to your doctor, view your test results, renew your prescriptions, schedule appointments, and more. How Do I Sign Up? In your internet browser, go to [email or web address] Click on the First Time User? Click Here link in the Sign In box. You will be redirect to the New Member Sign Up page. Enter your [employer] Access Code exactly as it appears below. You will not need to use this code after youve completed the sign-up process.  If you do not sign up before the expiration date, you must request a new code. [email or web address] Access Code: Activation code not generated Current [email or web address].[email or web address] Status: Active (This is the date your [email or web address] access code will ) Enter the last four digits of your Social Security Number (xxxx) and Date of Birth (mm/dd/yyyy) as indicated and click Submit. You will be taken to the next sign-up page. Create a niid.to ID. This will be your niid.to login ID and cannot be changed, so think of one that is secure and easy to remember. Create a niid.to password. You can change your password at any time. Enter your Password Reset Question and Answer. This can be used at a later time if you forget your password. Enter your e-mail address. You will receive e-mail notification when new information is available in [address] Click Sign Up. You can now view and download portions of your medical record. Click the Tujia link to download a portable copy of your medical information. Additional Information If you have questions, please visit the Frequently Asked Questions section of the [email or web address] website at [email or web address] Remember, [email or web address] is NOT to be used for urgent needs. For medical emergencies, dial 911. Patient armband removed and shredded Discharge Orders None Introducing [facility] [place] [facility] SERVICES! Dear Nicole Pang: 
Thank you for requesting a niid.to account. Our records indicate that you already have an active niid.to account. You can access your account anytime at [email or web address] Did you know that you can access your hospital and ER discharge instructions at any time in niid.to? You can also review all of your test results from your hospital stay or ER visit. Additional Information If you have questions, please visit the Frequently Asked Questions section of the [email or web address] website at [email or web address] Remember, MyChart is NOT to be used for urgent needs. For medical emergencies, dial 911. Now available from your iPhone and Android! General Information Please provide this summary of care documentation to your next provider. Patient Signature:  ____________________________________________________________ Date:  ____________________________________________________________  
  
Guinevere Coup Provider Signature:  ____________________________________________________________ Date:  ____________________________________________________________

## 2017-08-06 NOTE — ED NOTES
I performed a brief evaluation, including history and physical, of the patient here in triage and I have determined that pt will need further treatment and evaluation from the main side ER physician. I have placed initial orders to help in expediting patients care.      August 06, 2017 at 3:24 PM - Hanh Luciano PA-C        Visit Vitals    BP (!) 156/98 (BP 1 Location: Left arm, BP Patient Position: At rest)    Pulse (!) 121    Temp 98 °F (36.7 °C)    Resp 18    Ht 5' 4\" (1.626 m)    Wt 81.6 kg (180 lb)    SpO2 98%    BMI 30.9 kg/m2

## 2017-08-06 NOTE — ED TRIAGE NOTES
Patient arrived via spouse to receive help to detox off of alcohol. States last drink was around 1415 this afternoon. States she's only been an alcoholic for about 6 weeks due to some personal events that happen this summer. States she's nervous about the process of detox. Denies HI/SI.

## 2017-08-06 NOTE — IP AVS SNAPSHOT
[address] [address] Patient: Jose Nichols MRN: [id number] :1975 Current Discharge Medication List  
  
START taking these medications Dose & Instructions Dispensing Information Comments Morning Noon Evening Bedtime  
 nortriptyline 10 mg capsule Commonly known as:  PAMELOR Your last dose was: Your next dose is:    
   
   
 Dose:  10 mg Take 1 Cap by mouth nightly. Indications: generalized anxiety disorder Quantity:  30 Cap Refills:  0 CONTINUE these medications which have CHANGED Dose & Instructions Dispensing Information Comments Morning Noon Evening Bedtime ALPRAZolam 1 mg tablet Commonly known as:  Lamount Marten What changed:  when to take this Your last dose was: Your next dose is:    
   
   
 Dose:  1 mg Take 1 Tab by mouth two (2) times daily as needed for Anxiety. Max Daily Amount: 2 mg. Quantity:  30 Tab Refills:  0 CONTINUE these medications which have NOT CHANGED Dose & Instructions Dispensing Information Comments Morning Noon Evening Bedtime  
 buprenorphine-naloxone 8-2 mg Subl Your last dose was: Your next dose is:    
   
   
 Dose:  0.75 Tab  
0.75 Tabs by SubLINGual route daily. Refills:  0 Mth-Me Blue-Sod Phos-PhSal-Hyo 118-10-40.8-36 mg Cap capsule Commonly known as:  Torrey Desir Your last dose was: Your next dose is:    
   
   
 Dose:  1 Cap Take 1 Cap by mouth four (4) times daily as needed. Quantity:  60 Cap Refills:  4  
     
   
   
   
  
 promethazine 25 mg tablet Commonly known as:  PHENERGAN Your last dose was: Your next dose is:    
   
   
 Dose:  25 mg Take 1 Tab by mouth every six (6) hours as needed. Indications: nausea, vomiting Quantity:  12 Tab Refills:  0 STOP taking these medications FLUoxetine 10 mg capsule Commonly known as:  PROzac Where to Get Your Medications Information on where to get these meds will be given to you by the nurse or doctor. ! Ask your nurse or doctor about these medications  
  nortriptyline 10 mg capsule
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
## 2017-08-07 PROBLEM — F10.24 ALCOHOL-INDUCED DEPRESSIVE DISORDER WITH MODERATE OR SEVERE USE DISORDER (HCC): Status: ACTIVE | Noted: 2017-08-07

## 2017-08-07 PROCEDURE — 74011250637 HC RX REV CODE- 250/637: Performed by: STUDENT IN AN ORGANIZED HEALTH CARE EDUCATION/TRAINING PROGRAM

## 2017-08-07 PROCEDURE — 65220000003 HC RM SEMIPRIVATE PSYCH

## 2017-08-07 PROCEDURE — 74011250636 HC RX REV CODE- 250/636: Performed by: EMERGENCY MEDICINE

## 2017-08-07 PROCEDURE — 74011250637 HC RX REV CODE- 250/637: Performed by: EMERGENCY MEDICINE

## 2017-08-07 RX ORDER — LORAZEPAM 2 MG/ML
1-2 INJECTION INTRAMUSCULAR
Status: DISCONTINUED | OUTPATIENT
Start: 2017-08-07 | End: 2017-08-09 | Stop reason: HOSPADM

## 2017-08-07 RX ORDER — PROMETHAZINE HYDROCHLORIDE 25 MG/1
25 TABLET ORAL
Status: DISCONTINUED | OUTPATIENT
Start: 2017-08-07 | End: 2017-08-09 | Stop reason: HOSPADM

## 2017-08-07 RX ORDER — FOLIC ACID 1 MG/1
1 TABLET ORAL DAILY
Status: DISCONTINUED | OUTPATIENT
Start: 2017-08-07 | End: 2017-08-09 | Stop reason: HOSPADM

## 2017-08-07 RX ORDER — BUPRENORPHINE HYDROCHLORIDE AND NALOXONE HYDROCHLORIDE DIHYDRATE 8; 2 MG/1; MG/1
0.75 TABLET SUBLINGUAL DAILY
COMMUNITY
End: 2018-07-02

## 2017-08-07 RX ORDER — ALPRAZOLAM 0.5 MG/1
0.5 TABLET ORAL
Status: DISCONTINUED | OUTPATIENT
Start: 2017-08-07 | End: 2017-08-09 | Stop reason: HOSPADM

## 2017-08-07 RX ORDER — HALOPERIDOL 5 MG/ML
5 INJECTION INTRAMUSCULAR
Status: DISCONTINUED | OUTPATIENT
Start: 2017-08-07 | End: 2017-08-09 | Stop reason: HOSPADM

## 2017-08-07 RX ORDER — LORAZEPAM 1 MG/1
1 TABLET ORAL
Status: COMPLETED | OUTPATIENT
Start: 2017-08-07 | End: 2017-08-07

## 2017-08-07 RX ORDER — LOPERAMIDE HYDROCHLORIDE 2 MG/1
2 CAPSULE ORAL AS NEEDED
Status: DISCONTINUED | OUTPATIENT
Start: 2017-08-07 | End: 2017-08-09 | Stop reason: HOSPADM

## 2017-08-07 RX ORDER — BENZTROPINE MESYLATE 1 MG/ML
1-2 INJECTION INTRAMUSCULAR; INTRAVENOUS
Status: DISCONTINUED | OUTPATIENT
Start: 2017-08-07 | End: 2017-08-09 | Stop reason: HOSPADM

## 2017-08-07 RX ORDER — LORAZEPAM 2 MG/ML
1 INJECTION INTRAMUSCULAR
Status: COMPLETED | OUTPATIENT
Start: 2017-08-07 | End: 2017-08-07

## 2017-08-07 RX ORDER — LANOLIN ALCOHOL/MO/W.PET/CERES
100 CREAM (GRAM) TOPICAL DAILY
Status: DISCONTINUED | OUTPATIENT
Start: 2017-08-07 | End: 2017-08-09 | Stop reason: HOSPADM

## 2017-08-07 RX ORDER — THERA TABS 400 MCG
1 TAB ORAL DAILY
Status: DISCONTINUED | OUTPATIENT
Start: 2017-08-07 | End: 2017-08-09 | Stop reason: HOSPADM

## 2017-08-07 RX ORDER — HALOPERIDOL 5 MG/1
5 TABLET ORAL
Status: DISCONTINUED | OUTPATIENT
Start: 2017-08-07 | End: 2017-08-09 | Stop reason: HOSPADM

## 2017-08-07 RX ORDER — IBUPROFEN 600 MG/1
600 TABLET ORAL
Status: DISCONTINUED | OUTPATIENT
Start: 2017-08-07 | End: 2017-08-09 | Stop reason: HOSPADM

## 2017-08-07 RX ORDER — NORTRIPTYLINE HYDROCHLORIDE 10 MG/1
10 CAPSULE ORAL
Status: DISCONTINUED | OUTPATIENT
Start: 2017-08-07 | End: 2017-08-09 | Stop reason: HOSPADM

## 2017-08-07 RX ORDER — TRAZODONE HYDROCHLORIDE 50 MG/1
50 TABLET ORAL
Status: DISCONTINUED | OUTPATIENT
Start: 2017-08-07 | End: 2017-08-09 | Stop reason: HOSPADM

## 2017-08-07 RX ORDER — LORAZEPAM 1 MG/1
1-2 TABLET ORAL
Status: DISCONTINUED | OUTPATIENT
Start: 2017-08-07 | End: 2017-08-07

## 2017-08-07 RX ORDER — ONDANSETRON 2 MG/ML
4 INJECTION INTRAMUSCULAR; INTRAVENOUS
Status: COMPLETED | OUTPATIENT
Start: 2017-08-07 | End: 2017-08-07

## 2017-08-07 RX ORDER — BUPRENORPHINE HYDROCHLORIDE AND NALOXONE HYDROCHLORIDE DIHYDRATE 8; 2 MG/1; MG/1
1 TABLET SUBLINGUAL DAILY
Status: DISCONTINUED | OUTPATIENT
Start: 2017-08-07 | End: 2017-08-09 | Stop reason: HOSPADM

## 2017-08-07 RX ORDER — CHLORDIAZEPOXIDE HYDROCHLORIDE 25 MG/1
25 CAPSULE, GELATIN COATED ORAL
Status: DISCONTINUED | OUTPATIENT
Start: 2017-08-07 | End: 2017-08-09 | Stop reason: HOSPADM

## 2017-08-07 RX ORDER — BENZTROPINE MESYLATE 1 MG/1
1-2 TABLET ORAL
Status: DISCONTINUED | OUTPATIENT
Start: 2017-08-07 | End: 2017-08-09 | Stop reason: HOSPADM

## 2017-08-07 RX ADMIN — LORAZEPAM 1 MG: 2 INJECTION INTRAMUSCULAR; INTRAVENOUS at 07:17

## 2017-08-07 RX ADMIN — PROMETHAZINE HYDROCHLORIDE 25 MG: 25 TABLET ORAL at 12:21

## 2017-08-07 RX ADMIN — CHLORDIAZEPOXIDE HYDROCHLORIDE 25 MG: 25 CAPSULE ORAL at 12:21

## 2017-08-07 RX ADMIN — NORTRIPTYLINE HYDROCHLORIDE 10 MG: 10 CAPSULE ORAL at 20:52

## 2017-08-07 RX ADMIN — TRAZODONE HYDROCHLORIDE 50 MG: 50 TABLET ORAL at 20:52

## 2017-08-07 RX ADMIN — LORAZEPAM 1 MG: 1 TABLET ORAL at 09:55

## 2017-08-07 RX ADMIN — ONDANSETRON 4 MG: 2 INJECTION INTRAMUSCULAR; INTRAVENOUS at 07:17

## 2017-08-07 RX ADMIN — ALPRAZOLAM 0.5 MG: 0.5 TABLET ORAL at 17:22

## 2017-08-07 NOTE — ED NOTES
Bedside shift change report given to Flor WILKINSON (oncoming nurse) by Angeline Rocha (offgoing nurse). Report included the following information SBAR and MAR.

## 2017-08-07 NOTE — BH NOTES
Patient arrived on the unit from the ED escorted by security. Patient states that 6 weeks ago she started drinking heavily after a falling out with her biological father. Patient states that she drinks \"a large bottle of vodka every 3 days. \" Patient states that she posted something on Facebook 6 weeks ago telling her stepfather how grateful she was for him in her life and then her biological father sent her \"hateful letters basically disowning me\". Patient also states that she suffers with anxiety and takes Xanax 1mg TID for her anxiety but after the falling out with biological father she said the Xanax wasn't helping her anxiety. Patient states that she was drinking to get rid of her anxiety and now she is concerned about withdrawal. Patient states that when she googled it she saw that she could die from the withdrawal and she decided she should come to the hospital. Patient states that sh friend of hers \"is a true alcoholic\" and she came here and told patient to do the same. Patient is pleasant on admission and was informed to let nurses know if she has any symptoms of withdrawal. Patient denies SI/HI and AVH and is here strictly to be safe while stopping ETOH.

## 2017-08-07 NOTE — ED NOTES
Pt turned over to me dr Cristine Maria. Pt with alcohol abuse, no medical problmes, pending placement. General; AOX3. Pulmonary; CTA-B. Cardiac: RRR no MRG; Abd S/NT/ND. Plan:  Placement. 7am turned over to dr melendez pending placement.

## 2017-08-07 NOTE — BSMART NOTE
Comprehensive Assessment Form Part 1    Section I - Disposition    The plan is to review with psychiatrist for admission once a bed is available at [facility]. The Medical Doctor, Sahara Gray MD is in agreement with disposition. Section II - Integrated Summary  The information is given by the patient. The Chief Complaint is Etoh detox. The Precipitant Factors are family stressors. Previous Hospitalizations: [facility] AND [facility] [place] 5/2014    Patient is a 37 y.o. female Hx Anxiety and ADD presenting with alcohol problem, requesting detox. 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 She attributes her increased alcohol consumption to her increased levels of stress. Patient states,\"If I drink enough it reduces my anxiety. \" Patient reports she has been craving alcohol and feels nauseous when she doesn't drink. Current medications: Suboxone 6 mg daily and Xanax 1 mg q8hrs PRN. Patient reports detox from pain medications years ago and expresses a fear of dying from [address] detox. Last inpatient [facility] [place] 5/2014 after OD on Ambien. The patient denies SI, HI, and aud/vis hallucinations. The patient's appearance shows no evidence of impairment. The patient's behavior shows poor impulse control. The patient is oriented to time, place, person and situation. The patient's speech is soft. The patient's mood  is depressed and is sad. The range of affect is flat. The patient's thought content  demonstrates no evidence of impairment. The thought process is circumstantial. The patient's memory shows no evidence of impairment. The patient's appetite is decreased and shows signs of weight loss. The patient's sleep has evidence of insomnia. The patient shows little insight. The patient's judgement is impaired. Patient is voluntary for treatment and medicated for symptoms while in ER. No beds currently available at [facility].          Miri Ojeda

## 2017-08-07 NOTE — ED NOTES
TRANSFER - OUT REPORT:    Verbal report given to Rozina Dotson RN(name) on Claire Hogan  [facility] for routine progression of care       Report consisted of patients Situation, Background, Assessment and   Recommendations(SBAR). Information from the following report(s) SBAR, ED Summary and MAR was reviewed with the receiving nurse. Lines:       Opportunity for questions and clarification was provided.       Patient transported with:   [employer]

## 2017-08-07 NOTE — BSMART NOTE
ART THERAPY GROUP PROGRESS NOTE    PATIENT SCHEDULED FOR GROUP AT: 14:15    ATTENDANCE: Full    PARTICIPATION LEVEL: Participates fully in the art process    ATTENTION LEVEL: Able to focus on task    FOCUS: Demetrio 80 and positive thinking patterns     SYMBOLIC & THEMATIC CONTENT AS NOTED IN IMAGERY:  She was calm, compliant, and polite. She was invested in the task at hand and actively participated in group discussion. She shared how her mother's criticism, lack of emotional support, and negative outlook effected her at a young age and how it continues to effect the way she perceives herself. She claimed that she now realizes as an adult that her mother Raul Hernandez not be well herself and struggle with negative thinking,\" and how she is in control of her own thoughts. She was able to identify and challenge negative thoughts and cognitive distortions with positive affirmations through the use of the directive.

## 2017-08-07 NOTE — BH NOTES
GROUP THERAPY PROGRESS NOTE    Michael Green is not participating in Recreational Therapy.      Group time: 30 min    Personal goal for participation: n/a    Goal orientation: social    Group therapy participation: Not present    Therapeutic interventions reviewed and discussed: Positive social interaction and recreation    Impression of participation: not present

## 2017-08-07 NOTE — ED NOTES
7:28 AM : Pt care transferred to Dr. Rizwan An MD, ED provider. History of patient complaint(s), available diagnostic reports and current treatment plan has been discussed thoroughly. Bedside rounding on patient occurred : Yes  Intended disposition of patient : TBD  Pending diagnostics reports and/or labs (please list): Crisis evaluation    Nik Braswell MD      9:05 AM  Awaiting Bed at [facility], will go upstairs when bed is available.
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
## 2017-08-08 PROCEDURE — 74011250636 HC RX REV CODE- 250/636: Performed by: STUDENT IN AN ORGANIZED HEALTH CARE EDUCATION/TRAINING PROGRAM

## 2017-08-08 PROCEDURE — 65220000003 HC RM SEMIPRIVATE PSYCH

## 2017-08-08 PROCEDURE — 74011250637 HC RX REV CODE- 250/637: Performed by: STUDENT IN AN ORGANIZED HEALTH CARE EDUCATION/TRAINING PROGRAM

## 2017-08-08 RX ORDER — BUPRENORPHINE HYDROCHLORIDE AND NALOXONE HYDROCHLORIDE DIHYDRATE 8; 2 MG/1; MG/1
1 TABLET SUBLINGUAL ONCE
Status: COMPLETED | OUTPATIENT
Start: 2017-08-08 | End: 2017-08-08

## 2017-08-08 RX ADMIN — ALPRAZOLAM 0.5 MG: 0.5 TABLET ORAL at 05:24

## 2017-08-08 RX ADMIN — ALPRAZOLAM 0.5 MG: 0.5 TABLET ORAL at 17:03

## 2017-08-08 RX ADMIN — BUPRENORPHINE AND NALOXONE 1 TABLET: 8; 2 TABLET SUBLINGUAL at 08:19

## 2017-08-08 RX ADMIN — IBUPROFEN 600 MG: 600 TABLET, FILM COATED ORAL at 22:18

## 2017-08-08 RX ADMIN — THERA TABS 1 TABLET: TAB at 08:18

## 2017-08-08 RX ADMIN — ALPRAZOLAM 0.5 MG: 0.5 TABLET ORAL at 23:19

## 2017-08-08 RX ADMIN — ALPRAZOLAM 0.5 MG: 0.5 TABLET ORAL at 11:10

## 2017-08-08 RX ADMIN — NORTRIPTYLINE HYDROCHLORIDE 10 MG: 10 CAPSULE ORAL at 20:26

## 2017-08-08 RX ADMIN — TRAZODONE HYDROCHLORIDE 50 MG: 50 TABLET ORAL at 22:18

## 2017-08-08 RX ADMIN — PROMETHAZINE HYDROCHLORIDE 25 MG: 25 TABLET ORAL at 05:24

## 2017-08-08 RX ADMIN — IBUPROFEN 600 MG: 600 TABLET, FILM COATED ORAL at 08:19

## 2017-08-08 RX ADMIN — FOLIC ACID 1 MG: 1 TABLET ORAL at 08:19

## 2017-08-08 RX ADMIN — Medication 100 MG: at 08:19

## 2017-08-08 RX ADMIN — BUPRENORPHINE AND NALOXONE 1 TABLET: 8; 2 TABLET SUBLINGUAL at 12:15
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
## 2017-08-08 NOTE — BH NOTES
Allyssa Zacarias is  participating in Leisure-[employer]ive Group. Group time: 1600    Personal goal for participation:  Positive life changes    Goal orientation: leisure    Group therapy participation: active    Therapeutic interventions reviewed and discussed: Staff encouraged Pt.  To think positive    Impression of participation: Pt.  chose to be positive about life and making positive changes to turn a bad situation into a better  situation

## 2017-08-08 NOTE — BH NOTES
GROUP THERAPY PROGRESS NOTE    Amanda Mayen is participating in [place].      Group time: 30 minutes    Personal goal for participation: discuss guideline compliance, unit issues and community announcements; discuss daily Tx goal(s)                 Goal orientation: community    Group therapy participation: minimal

## 2017-08-08 NOTE — BH NOTES
7890 patient requested and received phenergan for nausea and xanax for anxiety. Patient went back to bed after taking medications. Patient stated the medications were effective.

## 2017-08-08 NOTE — BSMART NOTE
ART THERAPY GROUP PROGRESS NOTE    PATIENT SCHEDULED FOR GROUP AT: 14:15    ATTENDANCE: Full    PARTICIPATION LEVEL: Participates fully in the art process    ATTENTION LEVEL: Able to focus on task    FOCUS: Creative expression     SYMBOLIC & THEMATIC CONTENT AS NOTED IN IMAGERY: She was calm, compliant, and invested in the task at hand. She interacted with group members and offered encouragement. She claimed that she was feeling \"less anxious and more hopeful\" than when she was first admitted.

## 2017-08-08 NOTE — BH NOTES
GROUP THERAPY PROGRESS NOTE    Bree Shaffer is not participating in Recreational Therapy. Group time:45 min    Personal goal for participation: n/a    Goal orientation: social    Group therapy participation: Disinterested    Therapeutic interventions reviewed and discussed: Positive social interaction and recreational activty    Impression of participation: Pt.declined recreational group activity and preferred to keep to self while on unit.

## 2017-08-08 NOTE — BSMART NOTE
SW Contact: Met with pt to review tx plan goals & introduce her to a more expansive view of basic CBT principles. . Also educated her on the role of neurotransmitters & their impact on mood, cognitions & behavior. .. Covered handouts on goal setting & cognitive distortions to review with her DrDank.. OUTPT: She considering other outpt options since she was terminated from [address] [place] [address] for meds. .. Wants therapist there.

## 2017-08-08 NOTE — BH NOTES
Bree Shaffer is  participating in Le[employer]ive Group. Group time: 0875    Personal goal for participation:  Positive life changes    Goal orientation: leisure    Group therapy participation: active    Therapeutic interventions reviewed and discussed: Staff encouraged Pt.  To think positive    Impression of participation: Pt.  chose to be positive about life and making positive changes to turn a bad situation into a better  situation

## 2017-08-08 NOTE — PROGRESS NOTES
Problem: Suicide/Homicide (Adult/Pediatric)  Goal: *STG: Remains safe in hospital  Pt will remain safe daily while hospitalized. Outcome: Progressing Towards Goal  Pt remains safe free from injury  Goal: *STG: Seeks staff when feelings of self harm or harm towards others arise  Pt will contract for safety daily while hospitalized. Outcome: Progressing Towards Goal  Pt denies feelings of harm to self or others, denies A/VH  Goal: *STG/LTG: Complies with medication therapy  Pt will take medications daily while hospitalized. Outcome: Progressing Towards Goal  Pt compliant with medication therapy    Comments:   Patient cooperative, compliant with medications. She is visible in milieu, eats all meals and attends group and activities. Pt interacts with staff and peers. She denies feelings of harm to self or others, denies A/VH. She contracts for safety. Pt states she is not depressed but anxious. Encouraged positive coping strategies to which pt is receptive, and states she does deep breathing exercises, reading or arts coloring activity. Continues to monitor safety and provide support as needed.

## 2017-08-08 NOTE — BH NOTES
Psychiatric Progress Note    Date: 17  Patient Name: Noah Osullivan  : 1975  MRN: [id number]  Hospital Day: 3      INTERVAL HISTORY:   C/o poor sleep, says she is a \"bad sleeper\" and \"probably won't sleep well\" while in hospital. Difficulty falling asleep last night, couldn't stay asleep. Took nortriptyline last night with no issue. No side effects noted. Appetite good, denies nausea. No somatic complaints today. Appears calm, no evidence of tremors/ anxiety. Denies anxiety this morning. Only required 25 mg librium for detox, required xanax 0.5 mg po x3 in past 24 hours. Not suicidal/ homicidal at this time. MENTAL STATUS EXAM:  Appearance: awake, alert, Dressed in casual clothes with fair grooming and hygiene  Behavior: Cooperative with good eye contact  Motor: No psychomotor agitation/retardation  Speech: Normal rate, tone and volume  Mood: \"pretty tired\"  Affect: euthymic, calm, not anxious  Thought Process: linear, goal-directed  Thought Content: Denies SI and HI  Perception: Denies AH or VH  Concentration: fair  Memory: fair  Cognition: Alert and oriented  Insight: Fair  Judgment: Fair    RISK ASSESSMENT:   Prior Attempts: no noted prior  Lethality of Attempts: none noted prior  Current Ideation/Plan: denies  Protective Factors: limited  Future Orientation: limited    ASSESSMENT:   Patient is a 38 yo  female hx of severe, debilitating anxiety and opioid dependence on suboxone with recent alcohol dependence for past 6 weeks, here for anxiety and alcohol detox. Stabilizing well, discharge planning. Diagnoses:  Generalized anxiety disorder  Depressive disorder, unspecified  Alcohol use disorder, severe    Plan:  1. Continue with inpatient psychiatric treatment for containment, stabilization and medication management  2. Patient is to continue with group therapy  3.  Medications:  Librium 25 mg po q4h prn anxiety/ alcohol withdrawal  nortriptyline 10 mg po qhs for anxiety  Suboxone 8-2 mg daily  4. SW to help with disposition- will need psych appointment  5.  Discharge likely 24-48 hours

## 2017-08-09 VITALS
RESPIRATION RATE: 20 BRPM | TEMPERATURE: 97.2 F | OXYGEN SATURATION: 94 % | BODY MASS INDEX: 27.28 KG/M2 | SYSTOLIC BLOOD PRESSURE: 130 MMHG | WEIGHT: 180 LBS | DIASTOLIC BLOOD PRESSURE: 77 MMHG | HEART RATE: 81 BPM | HEIGHT: 68 IN

## 2017-08-09 PROCEDURE — 74011250637 HC RX REV CODE- 250/637: Performed by: STUDENT IN AN ORGANIZED HEALTH CARE EDUCATION/TRAINING PROGRAM

## 2017-08-09 PROCEDURE — 74011250636 HC RX REV CODE- 250/636: Performed by: STUDENT IN AN ORGANIZED HEALTH CARE EDUCATION/TRAINING PROGRAM

## 2017-08-09 RX ORDER — NORTRIPTYLINE HYDROCHLORIDE 10 MG/1
10 CAPSULE ORAL
Qty: 30 CAP | Refills: 0 | Status: ON HOLD | OUTPATIENT
Start: 2017-08-09 | End: 2022-09-15 | Stop reason: DRUGHIGH

## 2017-08-09 RX ADMIN — FOLIC ACID 1 MG: 1 TABLET ORAL at 08:06

## 2017-08-09 RX ADMIN — BUPRENORPHINE AND NALOXONE 1 TABLET: 8; 2 TABLET SUBLINGUAL at 08:06

## 2017-08-09 RX ADMIN — ALPRAZOLAM 0.5 MG: 0.5 TABLET ORAL at 12:19

## 2017-08-09 RX ADMIN — IBUPROFEN 600 MG: 600 TABLET, FILM COATED ORAL at 09:46

## 2017-08-09 RX ADMIN — THERA TABS 1 TABLET: TAB at 08:06

## 2017-08-09 RX ADMIN — Medication 100 MG: at 08:06

## 2017-08-09 RX ADMIN — ALPRAZOLAM 0.5 MG: 0.5 TABLET ORAL at 06:17

## 2017-08-09 NOTE — PROGRESS NOTES
conducted an initial consultation and Spiritual Assessment for Kristyn Durant, who is a 41 y.o.,female. Patients Primary Language is: [place]. According to the patients EMR Episcopal Affiliation is: Pentecostalism.     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 08/07/2017    SOB (shortness of breath) 06/15/2017    Chronic cystitis 09/25/2015    Abdominal pain 06/23/2014    KATI (generalized anxiety disorder) 05/27/2014    Depressive disorder, not elsewhere classified 05/27/2014    Opioid dependence in controlled environment ([facility] 05/27/2014    Fibromyalgia 07/28/2010        The  provided the following Interventions:  Initiated a relationship of care and support. Explored issues of saleem, belief, spirituality. Listened empathically. Offered prayer on patient's behalf. Chart reviewed. The following outcomes where achieved:  Patient processed feelings. Patient expressed gratitude for 's visit. Assessment:  Patient does not have any Christian/cultural needs that will affect patients preferences in health care. There are no spiritual or Christian issues which require intervention at this time. Plan:  Chaplains will continue to follow and will provide pastoral care on an as needed/requested basis.  recommends bedside caregivers page  on duty if patient shows signs of acute spiritual or emotional distress. Baylee Kenny.  [facility] [phone]

## 2017-08-09 NOTE — BH NOTES
GROUP THERAPY PROGRESS NOTE    Abel Alexandre is participating in [employer].      Group time: 30 minutes    Personal goal for participation: discuss guideline compliance, unit issues and community announcements; discuss post discharge plans/goals    Goal orientation: personal    Group therapy participation: active
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
## 2017-08-09 NOTE — BH NOTES
Pt.presented with elevated positive mood while interacting with peers/staff. She also enjoyed visit from family. Staff encouraged her to maintain positive attitude, increase level of participation in group activities, and to seek support if needed. She was receptive, polite, and compliant with medications, meals, hygiene, and unit protocol. She denied experiencing any current suicidal ideations. Staff will continue to monitor and support.

## 2017-08-09 NOTE — BH NOTES
Patient was discharged off unit with her belongings, discharge paperwork and prescriptions. Patient was being picked up by her .

## 2017-08-09 NOTE — DISCHARGE INSTRUCTIONS
BEHAVIORAL HEALTH NURSING DISCHARGE NOTE      Emergency Numbers    : [facility] Emergency Services: [phone]  Suicide Prevention Line: [phone] (TALK)      The following personal items collected during your admission are returned to you:   Dental Appliance: Dental Appliances: None  Vision: Visual Aid: None  Hearing Aid:    Jewelry: Jewelry: Bracelet, Earrings, Ring, With patient (wedding rings and earrings in possession)  Clothing: Clothing: Footwear, Pajamas, Pants, Shirt, Shorts, Undergarments, With patient (2pr.jeans,5shirts,underwear,2pj.,sneakers,sandals)  Other Valuables: Other Valuables: Cell Phone, Personal toiletries, Purse, Other (comment) (adapter in locker, bracelet and earings inside black purse)  Valuables sent to safe: Personal Items Sent to Safe:  ($16.53,[employer] cards, drivers license)        The discharge information has been reviewed with the patient. The patient verbalized understanding. [employer] Activation    Thank you for requesting access to [employer]. Please follow the instructions below to securely access and download your online medical record. [employer] allows you to send messages to your doctor, view your test results, renew your prescriptions, schedule appointments, and more. How Do I Sign Up? In your internet browser, go to [email or web address]  Click on the First Time User? Click Here link in the Sign In box. You will be redirect to the New Member Sign Up page. Enter your [employer] Access Code exactly as it appears below. You will not need to use this code after youve completed the sign-up process. If you do not sign up before the expiration date, you must request a new code. [employer] Access Code: Activation code not generated  Current [employer] Status: Active (This is the date your [employer] access code will )    Enter the last four digits of your Social Security Number (xxxx) and Date of Birth (mm/dd/yyyy) as indicated and click Submit.  You will be taken to the next sign-up page. Create a [employer] ID. This will be your [employer] login ID and cannot be changed, so think of one that is secure and easy to remember. Create a [employer] password. You can change your password at any time. Enter your Password Reset Question and Answer. This can be used at a later time if you forget your password. Enter your e-mail address. You will receive e-mail notification when new information is available in [address] Click Sign Up. You can now view and download portions of your medical record. Click the Provenance link to download a portable copy of your medical information. Additional Information    If you have questions, please visit the Frequently Asked Questions section of the [employer] website at [email or web address] Remember, [employer] is NOT to be used for urgent needs. For medical emergencies, dial 911.     Patient armband removed and shredded

## 2017-08-09 NOTE — PROGRESS NOTES
conducted an initial consultation and Spiritual Assessment for Jose Nichols, who is a 41 y.o.,female. Patients Primary Language is: [place]. According to the patients EMR Mandaeism Affiliation is: Holiness.     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 Explored issues of saleem, belief, spirituality and Oriental orthodox/ritual needs while hospitalized. Listened empathically as patient shared. Provided chaplaincy education. Provided information about Spiritual Care Services. Offered prayer and assurance of continued prayers on patient's behalf. Chart reviewed. The following outcomes where achieved:  Patient shared limited information about both their medical narrative and spiritual journey/beliefs. Patient processed feeling about current hospitalization. Patient expressed gratitude for 's visit. Plan:  Chaplains will continue to follow and will provide pastoral care on an as needed/requested basis.  recommends bedside caregivers page  on duty if patient shows signs of acute spiritual or emotional distress.       Merna Zimmerman  [address] [place] [address]  [phone]

## 2017-08-24 ENCOUNTER — HOSPITAL ENCOUNTER (EMERGENCY)
Age: 42
Discharge: HOME | End: 2017-08-24
Attending: EMERGENCY MEDICINE | Admitting: EMERGENCY MEDICINE
Payer: COMMERCIAL

## 2017-08-24 VITALS
DIASTOLIC BLOOD PRESSURE: 85 MMHG | OXYGEN SATURATION: 99 % | TEMPERATURE: 98 F | BODY MASS INDEX: 32.44 KG/M2 | HEART RATE: 81 BPM | WEIGHT: 190 LBS | RESPIRATION RATE: 16 BRPM | SYSTOLIC BLOOD PRESSURE: 142 MMHG | HEIGHT: 64 IN

## 2017-08-24 DIAGNOSIS — N39.0 URINARY TRACT INFECTION WITH HEMATURIA, SITE UNSPECIFIED: Primary | ICD-10-CM

## 2017-08-24 DIAGNOSIS — R31.9 URINARY TRACT INFECTION WITH HEMATURIA, SITE UNSPECIFIED: Primary | ICD-10-CM

## 2017-08-24 LAB
APPEARANCE UR: CLEAR
BACTERIA URNS QL MICRO: ABNORMAL /HPF
BILIRUB UR QL: NEGATIVE
COLOR UR: YELLOW
EPITH CASTS URNS QL MICRO: ABNORMAL /LPF (ref 0–5)
GLUCOSE UR STRIP.AUTO-MCNC: NEGATIVE MG/DL
HCG UR QL: NEGATIVE
HGB UR QL STRIP: ABNORMAL
KETONES UR QL STRIP.AUTO: NEGATIVE MG/DL
LEUKOCYTE ESTERASE UR QL STRIP.AUTO: ABNORMAL
NITRITE UR QL STRIP.AUTO: NEGATIVE
PH UR STRIP: 6.5 [PH] (ref 5–8)
PROT UR STRIP-MCNC: NEGATIVE MG/DL
RBC #/AREA URNS HPF: ABNORMAL /HPF (ref 0–5)
SP GR UR REFRACTOMETRY: 1.01 (ref 1–1.03)
UROBILINOGEN UR QL STRIP.AUTO: 0.2 EU/DL (ref 0.2–1)
WBC URNS QL MICRO: ABNORMAL /HPF (ref 0–4)

## 2017-08-24 PROCEDURE — 81025 URINE PREGNANCY TEST: CPT | Performed by: EMERGENCY MEDICINE

## 2017-08-24 PROCEDURE — 99283 EMERGENCY DEPT VISIT LOW MDM: CPT

## 2017-08-24 PROCEDURE — 87086 URINE CULTURE/COLONY COUNT: CPT | Performed by: EMERGENCY MEDICINE

## 2017-08-24 PROCEDURE — 81001 URINALYSIS AUTO W/SCOPE: CPT | Performed by: EMERGENCY MEDICINE

## 2017-08-24 RX ORDER — NITROFURANTOIN 25; 75 MG/1; MG/1
100 CAPSULE ORAL 2 TIMES DAILY
Qty: 14 CAP | Refills: 0 | Status: SHIPPED | OUTPATIENT
Start: 2017-08-24 | End: 2017-08-31

## 2017-08-24 RX ORDER — PROMETHAZINE HYDROCHLORIDE 25 MG/1
25 TABLET ORAL
Qty: 12 TAB | Refills: 0 | Status: SHIPPED | OUTPATIENT
Start: 2017-08-24 | End: 2017-12-17

## 2017-08-24 NOTE — ED TRIAGE NOTES
Patient presents to ER C/O bladder infection, states the pain is the same as 2 months ago. States it awoke her from sleep at 3:30 am (2 hours ago).

## 2017-08-24 NOTE — ED NOTES
I have reviewed discharge instructions with the patient. The patient verbalized understanding. Medication teaching given, to include name, dose, action, and side effects. Patient verbalized understanding of medications. Encouraged patient to voice any concerns with reassurance provided. Instructed not to drive or use heavy machinery while taking this medication. Patient armband removed and shredded    Patient Discharged in stable condition. Patient is awake, alert and oriented x 4.

## 2017-08-24 NOTE — DISCHARGE INSTRUCTIONS
Return for pain, fever, shortness of breath, vomiting, decreased fluid intake, weakness, numbness, dizziness, or any change or concerns. Urinary Tract Infection in Women: Care Instructions  Your Care Instructions    A urinary tract infection, or UTI, is a general term for an infection anywhere between the kidneys and the urethra (where urine comes out). Most UTIs are bladder infections. They often cause pain or burning when you urinate. UTIs are caused by bacteria and can be cured with antibiotics. Be sure to complete your treatment so that the infection goes away. Follow-up care is a key part of your treatment and safety. Be sure to make and go to all appointments, and call your doctor if you are having problems. It's also a good idea to know your test results and keep a list of the medicines you take. How can you care for yourself at home? · Take your antibiotics as directed. Do not stop taking them just because you feel better. You need to take the full course of antibiotics. · Drink extra water and other fluids for the next day or two. This may help wash out the bacteria that are causing the infection. (If you have kidney, heart, or liver disease and have to limit fluids, talk with your doctor before you increase your fluid intake.)  · Avoid drinks that are carbonated or have caffeine. They can irritate the bladder. · Urinate often. Try to empty your bladder each time. · To relieve pain, take a hot bath or lay a heating pad set on low over your lower belly or genital area. Never go to sleep with a heating pad in place. To prevent UTIs  · Drink plenty of water each day. This helps you urinate often, which clears bacteria from your system. (If you have kidney, heart, or liver disease and have to limit fluids, talk with your doctor before you increase your fluid intake.)  · Urinate when you need to. · Urinate right after you have sex. · Change sanitary pads often.   · Avoid douches, bubble baths, feminine hygiene sprays, and other feminine hygiene products that have deodorants. · After going to the bathroom, wipe from front to back. When should you call for help? Call your doctor now or seek immediate medical care if:  · Symptoms such as fever, chills, nausea, or vomiting get worse or appear for the first time. · You have new pain in your back just below your rib cage. This is called flank pain. · There is new blood or pus in your urine. · You have any problems with your antibiotic medicine. Watch closely for changes in your health, and be sure to contact your doctor if:  · You are not getting better after taking an antibiotic for 2 days. · Your symptoms go away but then come back. Where can you learn more? Go to http://marita-venecia.info/. Enter A257 in the search box to learn more about \"Urinary Tract Infection in Women: Care Instructions. \"  Current as of: November 28, 2016  Content Version: 11.3  © 2461-7147 [employer]. Care instructions adapted under license by [employer] (which disclaims liability or warranty for this information). If you have questions about a medical condition or this instruction, always ask your healthcare professional. [address] any warranty or liability for your use of this information.
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
## 2017-08-26 LAB
BACTERIA SPEC CULT: NORMAL
SERVICE CMNT-IMP: NORMAL

## 2017-12-17 ENCOUNTER — HOSPITAL ENCOUNTER (EMERGENCY)
Age: 42
Discharge: HOME | End: 2017-12-17
Attending: EMERGENCY MEDICINE
Payer: COMMERCIAL

## 2017-12-17 VITALS
BODY MASS INDEX: 33.29 KG/M2 | HEART RATE: 121 BPM | RESPIRATION RATE: 18 BRPM | OXYGEN SATURATION: 97 % | TEMPERATURE: 98.5 F | SYSTOLIC BLOOD PRESSURE: 140 MMHG | WEIGHT: 195 LBS | DIASTOLIC BLOOD PRESSURE: 80 MMHG | HEIGHT: 64 IN

## 2017-12-17 DIAGNOSIS — R11.15 NON-INTRACTABLE CYCLICAL VOMITING WITH NAUSEA: Primary | ICD-10-CM

## 2017-12-17 LAB
ALBUMIN SERPL-MCNC: 4.4 G/DL (ref 3.4–5)
ALBUMIN/GLOB SERPL: 1 {RATIO} (ref 0.8–1.7)
ALP SERPL-CCNC: 115 U/L (ref 45–117)
ALT SERPL-CCNC: 155 U/L (ref 13–56)
ANION GAP SERPL CALC-SCNC: 12 MMOL/L (ref 3–18)
AST SERPL-CCNC: 115 U/L (ref 15–37)
BASOPHILS # BLD: 0 K/UL (ref 0–0.06)
BASOPHILS NFR BLD: 0 % (ref 0–2)
BILIRUB SERPL-MCNC: 1 MG/DL (ref 0.2–1)
BUN SERPL-MCNC: 12 MG/DL (ref 7–18)
BUN/CREAT SERPL: 15 (ref 12–20)
CALCIUM SERPL-MCNC: 9.8 MG/DL (ref 8.5–10.1)
CHLORIDE SERPL-SCNC: 102 MMOL/L (ref 100–108)
CO2 SERPL-SCNC: 28 MMOL/L (ref 21–32)
CREAT SERPL-MCNC: 0.78 MG/DL (ref 0.6–1.3)
DIFFERENTIAL METHOD BLD: ABNORMAL
EOSINOPHIL # BLD: 0 K/UL (ref 0–0.4)
EOSINOPHIL NFR BLD: 0 % (ref 0–5)
ERYTHROCYTE [DISTWIDTH] IN BLOOD BY AUTOMATED COUNT: 12.3 % (ref 11.6–14.5)
GLOBULIN SER CALC-MCNC: 4.3 G/DL (ref 2–4)
GLUCOSE SERPL-MCNC: 133 MG/DL (ref 74–99)
HCG UR QL: NEGATIVE
HCT VFR BLD AUTO: 42.3 % (ref 35–45)
HGB BLD-MCNC: 13.9 G/DL (ref 12–16)
LYMPHOCYTES # BLD: 0.8 K/UL (ref 0.9–3.6)
LYMPHOCYTES NFR BLD: 8 % (ref 21–52)
MCH RBC QN AUTO: 27.4 PG (ref 24–34)
MCHC RBC AUTO-ENTMCNC: 32.9 G/DL (ref 31–37)
MCV RBC AUTO: 83.4 FL (ref 74–97)
MONOCYTES # BLD: 0.3 K/UL (ref 0.05–1.2)
MONOCYTES NFR BLD: 3 % (ref 3–10)
NEUTS SEG # BLD: 8.4 K/UL (ref 1.8–8)
NEUTS SEG NFR BLD: 89 % (ref 40–73)
PLATELET # BLD AUTO: 315 K/UL (ref 135–420)
PMV BLD AUTO: 9.2 FL (ref 9.2–11.8)
POTASSIUM SERPL-SCNC: 3.8 MMOL/L (ref 3.5–5.5)
PROT SERPL-MCNC: 8.7 G/DL (ref 6.4–8.2)
RBC # BLD AUTO: 5.07 M/UL (ref 4.2–5.3)
SODIUM SERPL-SCNC: 142 MMOL/L (ref 136–145)
WBC # BLD AUTO: 9.5 K/UL (ref 4.6–13.2)

## 2017-12-17 PROCEDURE — 81025 URINE PREGNANCY TEST: CPT | Performed by: EMERGENCY MEDICINE

## 2017-12-17 PROCEDURE — 74011250636 HC RX REV CODE- 250/636: Performed by: EMERGENCY MEDICINE

## 2017-12-17 PROCEDURE — 85025 COMPLETE CBC W/AUTO DIFF WBC: CPT | Performed by: EMERGENCY MEDICINE

## 2017-12-17 PROCEDURE — 80053 COMPREHEN METABOLIC PANEL: CPT | Performed by: EMERGENCY MEDICINE

## 2017-12-17 PROCEDURE — 99282 EMERGENCY DEPT VISIT SF MDM: CPT

## 2017-12-17 PROCEDURE — 96361 HYDRATE IV INFUSION ADD-ON: CPT

## 2017-12-17 PROCEDURE — 96374 THER/PROPH/DIAG INJ IV PUSH: CPT

## 2017-12-17 RX ORDER — ONDANSETRON 2 MG/ML
4 INJECTION INTRAMUSCULAR; INTRAVENOUS
Status: DISCONTINUED | OUTPATIENT
Start: 2017-12-17 | End: 2017-12-17 | Stop reason: SDUPTHER

## 2017-12-17 RX ORDER — ONDANSETRON 2 MG/ML
INJECTION INTRAMUSCULAR; INTRAVENOUS
Status: DISPENSED
Start: 2017-12-17 | End: 2017-12-17

## 2017-12-17 RX ORDER — ONDANSETRON 4 MG/1
4 TABLET, FILM COATED ORAL
Qty: 12 TAB | Refills: 0 | Status: SHIPPED | OUTPATIENT
Start: 2017-12-17 | End: 2018-07-02

## 2017-12-17 RX ORDER — ONDANSETRON 2 MG/ML
4 INJECTION INTRAMUSCULAR; INTRAVENOUS
Status: COMPLETED | OUTPATIENT
Start: 2017-12-17 | End: 2017-12-17

## 2017-12-17 RX ADMIN — ONDANSETRON 4 MG: 2 INJECTION INTRAMUSCULAR; INTRAVENOUS at 01:23

## 2017-12-17 RX ADMIN — SODIUM CHLORIDE 1000 ML: 900 INJECTION, SOLUTION INTRAVENOUS at 01:23
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
## 2017-12-17 NOTE — ED TRIAGE NOTES
Pt reports onset of nausea and vomiting for past 21 hours. States son has same thing. Pt reports needs to taker her anti- anxiety meds but cant keep them down.

## 2018-04-23 ENCOUNTER — HOSPITAL ENCOUNTER (OUTPATIENT)
Dept: ULTRASOUND IMAGING | Age: 43
Discharge: HOME | End: 2018-04-23
Attending: FAMILY MEDICINE
Payer: COMMERCIAL

## 2018-04-23 DIAGNOSIS — R74.01 NONSPECIFIC ELEVATION OF LEVELS OF TRANSAMINASE OR LACTIC ACID DEHYDROGENASE (LDH): ICD-10-CM

## 2018-04-23 DIAGNOSIS — R74.02 NONSPECIFIC ELEVATION OF LEVELS OF TRANSAMINASE OR LACTIC ACID DEHYDROGENASE (LDH): ICD-10-CM

## 2018-04-23 PROCEDURE — 76700 US EXAM ABDOM COMPLETE: CPT

## 2019-11-05 ENCOUNTER — HOSPITAL ENCOUNTER (EMERGENCY)
Age: 44
Discharge: HOME | End: 2019-11-05
Attending: EMERGENCY MEDICINE
Payer: COMMERCIAL

## 2019-11-05 VITALS
OXYGEN SATURATION: 100 % | HEIGHT: 64 IN | HEART RATE: 83 BPM | BODY MASS INDEX: 32.1 KG/M2 | DIASTOLIC BLOOD PRESSURE: 84 MMHG | RESPIRATION RATE: 18 BRPM | TEMPERATURE: 98.8 F | SYSTOLIC BLOOD PRESSURE: 139 MMHG | WEIGHT: 188 LBS

## 2019-11-05 DIAGNOSIS — B96.89 BV (BACTERIAL VAGINOSIS): Primary | ICD-10-CM

## 2019-11-05 DIAGNOSIS — N76.0 BV (BACTERIAL VAGINOSIS): Primary | ICD-10-CM

## 2019-11-05 LAB
APPEARANCE UR: CLEAR
BACTERIA URNS QL MICRO: ABNORMAL /HPF
BILIRUB UR QL: NEGATIVE
COLOR UR: ABNORMAL
EPITH CASTS URNS QL MICRO: ABNORMAL /LPF (ref 0–5)
GLUCOSE UR STRIP.AUTO-MCNC: NEGATIVE MG/DL
HCG UR QL: NEGATIVE
HGB UR QL STRIP: NEGATIVE
KETONES UR QL STRIP.AUTO: NEGATIVE MG/DL
LEUKOCYTE ESTERASE UR QL STRIP.AUTO: ABNORMAL
NITRITE UR QL STRIP.AUTO: NEGATIVE
PH UR STRIP: 6.5 [PH] (ref 5–8)
PROT UR STRIP-MCNC: ABNORMAL MG/DL
RBC #/AREA URNS HPF: ABNORMAL /HPF (ref 0–5)
SERVICE CMNT-IMP: NORMAL
SP GR UR REFRACTOMETRY: 1.02 (ref 1–1.03)
UROBILINOGEN UR QL STRIP.AUTO: 1 EU/DL (ref 0.2–1)
WBC URNS QL MICRO: ABNORMAL /HPF (ref 0–4)
WET PREP GENITAL: NORMAL

## 2019-11-05 PROCEDURE — 87491 CHLMYD TRACH DNA AMP PROBE: CPT

## 2019-11-05 PROCEDURE — 99282 EMERGENCY DEPT VISIT SF MDM: CPT

## 2019-11-05 PROCEDURE — 81025 URINE PREGNANCY TEST: CPT

## 2019-11-05 PROCEDURE — 87210 SMEAR WET MOUNT SALINE/INK: CPT

## 2019-11-05 PROCEDURE — 81001 URINALYSIS AUTO W/SCOPE: CPT

## 2019-11-05 RX ORDER — METRONIDAZOLE 500 MG/1
500 TABLET ORAL 2 TIMES DAILY
Qty: 14 TAB | Refills: 0 | Status: SHIPPED | OUTPATIENT
Start: 2019-11-05 | End: 2019-11-12

## 2019-11-05 RX ORDER — FLUCONAZOLE 150 MG/1
150 TABLET ORAL
Qty: 1 TAB | Refills: 0 | Status: SHIPPED | OUTPATIENT
Start: 2019-11-15 | End: 2019-11-15

## 2019-11-05 NOTE — ED TRIAGE NOTES
Pt co vaginal burning states was seen at  [facility] and treated for BV 2 weeks ago finished meds however still has  Burning
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
## 2019-11-05 NOTE — PROGRESS NOTES
conducted an initial consultation and Spiritual Assessment for Pillo Lopes, who is a 44 y.o.,female. Patient's Primary Language is: [place]. According to the patient's EMR Episcopalian Affiliation is: Jennifer Coleman.     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 Explored issues of saleem, belief, spirituality and Episcopal/ritual needs while hospitalized. Listened empathically. Provided chaplaincy education. Provided information about Spiritual Care Services. Offered prayer and assurance of continued prayers on patient's behalf. Chart reviewed. The following outcomes where achieved:  Patient shared limited information about both their medical narrative and spiritual journey/beliefs.  confirmed Patient's Episcopalian Affiliation. Patient processed feeling about current hospitalization. Patient expressed gratitude for 's visit. Assessment:  Patient does not have any Episcopal/cultural needs that will affect patient's preferences in health care. There are no spiritual or Episcopal issues which require intervention at this time. Plan:  Chaplains will continue to follow and will provide pastoral care on an as needed/requested basis.    recommends bedside caregivers page  on duty if patient shows signs of acute spiritual or emotional distress.     [address] [facility]   [phone]

## 2019-11-05 NOTE — ED NOTES
Janeth Paez is a 44 y.o. female that was discharged in good condition. The patients diagnosis, condition and treatment were explained to  patient and aftercare instructions were given by PA. The patient verbalized understanding.

## 2019-11-05 NOTE — DISCHARGE INSTRUCTIONS
Patient Education        Bacterial Vaginosis: Care Instructions  Your Care Instructions    Bacterial vaginosis is a type of vaginal infection. It is caused by excess growth of certain bacteria that are normally found in the vagina. Symptoms can include itching, swelling, pain when you urinate or have sex, and a gray or yellow discharge with a \"fishy\" odor. It is not considered an infection that is spread through sexual contact. Although symptoms can be annoying and uncomfortable, bacterial vaginosis does not usually cause other health problems. However, if you have it while you are pregnant, it can cause complications. While the infection may go away on its own, most doctors use antibiotics to treat it. You may have been prescribed pills or vaginal cream. With treatment, bacterial vaginosis usually clears up in 5 to 7 days. Follow-up care is a key part of your treatment and safety. Be sure to make and go to all appointments, and call your doctor if you are having problems. It's also a good idea to know your test results and keep a list of the medicines you take. How can you care for yourself at home? · Take your antibiotics as directed. Do not stop taking them just because you feel better. You need to take the full course of antibiotics. · Do not eat or drink anything that contains alcohol if you are taking metronidazole (Flagyl). · Keep using your medicine if you start your period. Use pads instead of tampons while using a vaginal cream or suppository. Tampons can absorb the medicine. · Wear loose cotton clothing. Do not wear nylon and other materials that hold body heat and moisture close to the skin. · Do not scratch. Relieve itching with a cold pack or a cool bath. · Do not wash your vaginal area more than once a day. Use plain water or a mild, unscented soap. Do not douche. When should you call for help?   Watch closely for changes in your health, and be sure to contact your doctor if:    · You have unexpected vaginal bleeding.     · You have a fever.     · You have new or increased pain in your vagina or pelvis.     · You are not getting better after 1 week.     · Your symptoms return after you finish the course of your medicine. Where can you learn more? Go to http://marita-venecia.info/. Ottoniel Liao in the search box to learn more about \"Bacterial Vaginosis: Care Instructions. \"  Current as of: February 19, 2019  Content Version: 12.2  © 7264-2626 [employer]. Care instructions adapted under license by [employer] (which disclaims liability or warranty for this information). If you have questions about a medical condition or this instruction, always ask your healthcare professional. [address] any warranty or liability for your use of this information. Follow up with GYN doctor if symptoms worsen. Do not drink while on antibiotics. Take antibiotics with food.

## 2020-05-26 NOTE — ED NOTES
Report received from [place], [facility]. Assuming care of patient at this time. pt is a former RN, she wants to administer enema herself, RN provide the enema fleet and commode at the bedside, educate if she feels dizziness, weakness to call for help. At this moment no complaints

## 2020-07-06 ENCOUNTER — HOSPITAL ENCOUNTER (EMERGENCY)
Age: 45
Discharge: HOME | End: 2020-07-06
Attending: EMERGENCY MEDICINE
Payer: COMMERCIAL

## 2020-07-06 ENCOUNTER — APPOINTMENT (OUTPATIENT)
Dept: CT IMAGING | Age: 45
End: 2020-07-06
Attending: EMERGENCY MEDICINE
Payer: COMMERCIAL

## 2020-07-06 VITALS
SYSTOLIC BLOOD PRESSURE: 149 MMHG | WEIGHT: 185 LBS | TEMPERATURE: 97.3 F | HEART RATE: 67 BPM | DIASTOLIC BLOOD PRESSURE: 89 MMHG | OXYGEN SATURATION: 97 % | BODY MASS INDEX: 31.58 KG/M2 | HEIGHT: 64 IN | RESPIRATION RATE: 15 BRPM

## 2020-07-06 DIAGNOSIS — K59.00 CONSTIPATION, UNSPECIFIED CONSTIPATION TYPE: Primary | ICD-10-CM

## 2020-07-06 DIAGNOSIS — D25.0 SUBMUCOUS LEIOMYOMA OF UTERUS: ICD-10-CM

## 2020-07-06 LAB
ALBUMIN SERPL-MCNC: 3.9 G/DL (ref 3.4–5)
ALBUMIN/GLOB SERPL: 1 {RATIO} (ref 0.8–1.7)
ALP SERPL-CCNC: 65 U/L (ref 45–117)
ALT SERPL-CCNC: 35 U/L (ref 13–56)
ANION GAP SERPL CALC-SCNC: 3 MMOL/L (ref 3–18)
APPEARANCE UR: CLEAR
AST SERPL-CCNC: 21 U/L (ref 10–38)
BACTERIA URNS QL MICRO: NEGATIVE /HPF
BASOPHILS # BLD: 0 K/UL (ref 0–0.1)
BASOPHILS NFR BLD: 0 % (ref 0–2)
BILIRUB SERPL-MCNC: 0.3 MG/DL (ref 0.2–1)
BILIRUB UR QL: NEGATIVE
BUN SERPL-MCNC: 14 MG/DL (ref 7–18)
BUN/CREAT SERPL: 19 (ref 12–20)
CALCIUM SERPL-MCNC: 8.5 MG/DL (ref 8.5–10.1)
CHLORIDE SERPL-SCNC: 105 MMOL/L (ref 100–111)
CK MB CFR SERPL CALC: NORMAL % (ref 0–4)
CK MB SERPL-MCNC: <1 NG/ML (ref 5–25)
CK SERPL-CCNC: 179 U/L (ref 26–192)
CO2 SERPL-SCNC: 29 MMOL/L (ref 21–32)
COLOR UR: YELLOW
CREAT SERPL-MCNC: 0.75 MG/DL (ref 0.6–1.3)
DIFFERENTIAL METHOD BLD: ABNORMAL
EOSINOPHIL # BLD: 0.1 K/UL (ref 0–0.4)
EOSINOPHIL NFR BLD: 1 % (ref 0–5)
EPITH CASTS URNS QL MICRO: NORMAL /LPF (ref 0–5)
ERYTHROCYTE [DISTWIDTH] IN BLOOD BY AUTOMATED COUNT: 13.1 % (ref 11.6–14.5)
GLOBULIN SER CALC-MCNC: 3.9 G/DL (ref 2–4)
GLUCOSE SERPL-MCNC: 139 MG/DL (ref 74–99)
GLUCOSE UR STRIP.AUTO-MCNC: NEGATIVE MG/DL
HCG UR QL: NEGATIVE
HCT VFR BLD AUTO: 37.1 % (ref 35–45)
HGB BLD-MCNC: 12.1 G/DL (ref 12–16)
HGB UR QL STRIP: ABNORMAL
KETONES UR QL STRIP.AUTO: NEGATIVE MG/DL
LEUKOCYTE ESTERASE UR QL STRIP.AUTO: NEGATIVE
LIPASE SERPL-CCNC: 186 U/L (ref 73–393)
LYMPHOCYTES # BLD: 1.1 K/UL (ref 0.9–3.6)
LYMPHOCYTES NFR BLD: 10 % (ref 21–52)
MCH RBC QN AUTO: 27.3 PG (ref 24–34)
MCHC RBC AUTO-ENTMCNC: 32.6 G/DL (ref 31–37)
MCV RBC AUTO: 83.7 FL (ref 74–97)
MONOCYTES # BLD: 0.5 K/UL (ref 0.05–1.2)
MONOCYTES NFR BLD: 4 % (ref 3–10)
NEUTS SEG # BLD: 8.7 K/UL (ref 1.8–8)
NEUTS SEG NFR BLD: 85 % (ref 40–73)
NITRITE UR QL STRIP.AUTO: NEGATIVE
PH UR STRIP: 7.5 [PH] (ref 5–8)
PLATELET # BLD AUTO: 215 K/UL (ref 135–420)
PMV BLD AUTO: 9.7 FL (ref 9.2–11.8)
POTASSIUM SERPL-SCNC: 3.8 MMOL/L (ref 3.5–5.5)
PROT SERPL-MCNC: 7.8 G/DL (ref 6.4–8.2)
PROT UR STRIP-MCNC: ABNORMAL MG/DL
RBC # BLD AUTO: 4.43 M/UL (ref 4.2–5.3)
RBC #/AREA URNS HPF: NORMAL /HPF (ref 0–5)
SODIUM SERPL-SCNC: 137 MMOL/L (ref 136–145)
SP GR UR REFRACTOMETRY: 1.02 (ref 1–1.03)
TROPONIN I SERPL-MCNC: <0.02 NG/ML (ref 0–0.04)
UROBILINOGEN UR QL STRIP.AUTO: 1 EU/DL (ref 0.2–1)
WBC # BLD AUTO: 10.3 K/UL (ref 4.6–13.2)
WBC URNS QL MICRO: NORMAL /HPF (ref 0–4)

## 2020-07-06 PROCEDURE — 74177 CT ABD & PELVIS W/CONTRAST: CPT

## 2020-07-06 PROCEDURE — 93005 ELECTROCARDIOGRAM TRACING: CPT

## 2020-07-06 PROCEDURE — 83690 ASSAY OF LIPASE: CPT

## 2020-07-06 PROCEDURE — 99284 EMERGENCY DEPT VISIT MOD MDM: CPT

## 2020-07-06 PROCEDURE — 81025 URINE PREGNANCY TEST: CPT

## 2020-07-06 PROCEDURE — 81001 URINALYSIS AUTO W/SCOPE: CPT

## 2020-07-06 PROCEDURE — 74011636320 HC RX REV CODE- 636/320: Performed by: EMERGENCY MEDICINE

## 2020-07-06 PROCEDURE — 80053 COMPREHEN METABOLIC PANEL: CPT

## 2020-07-06 PROCEDURE — 85025 COMPLETE CBC W/AUTO DIFF WBC: CPT

## 2020-07-06 PROCEDURE — 82550 ASSAY OF CK (CPK): CPT

## 2020-07-06 RX ORDER — DOCUSATE SODIUM 100 MG/1
100 CAPSULE, LIQUID FILLED ORAL 2 TIMES DAILY
Qty: 60 CAP | Refills: 2 | Status: SHIPPED | OUTPATIENT
Start: 2020-07-06 | End: 2020-10-04

## 2020-07-06 RX ORDER — MAGNESIUM CITRATE
SOLUTION, ORAL ORAL
Qty: 1 BOTTLE | Refills: 0 | Status: SHIPPED | OUTPATIENT
Start: 2020-07-06 | End: 2022-09-01

## 2020-07-06 RX ADMIN — IOPAMIDOL 100 ML: 612 INJECTION, SOLUTION INTRAVENOUS at 22:30

## 2020-07-07 LAB
ATRIAL RATE: 67 BPM
CALCULATED P AXIS, ECG09: 53 DEGREES
CALCULATED R AXIS, ECG10: -10 DEGREES
CALCULATED T AXIS, ECG11: 24 DEGREES
DIAGNOSIS, 93000: NORMAL
P-R INTERVAL, ECG05: 160 MS
Q-T INTERVAL, ECG07: 404 MS
QRS DURATION, ECG06: 92 MS
QTC CALCULATION (BEZET), ECG08: 426 MS
VENTRICULAR RATE, ECG03: 67 BPM

## 2020-07-07 NOTE — DISCHARGE INSTRUCTIONS
Patient Education        Constipation: Care Instructions  Your Care Instructions     Constipation means that you have a hard time passing stools (bowel movements). People pass stools from 3 times a day to once every 3 days. What is normal for you may be different. Constipation may occur with pain in the rectum and cramping. The pain may get worse when you try to pass stools. Sometimes there are small amounts of bright red blood on toilet paper or the surface of stools. This is because of enlarged veins near the rectum (hemorrhoids). A few changes in your diet and lifestyle may help you avoid ongoing constipation. Your doctor may also prescribe medicine to help loosen your stool. Some medicines can cause constipation. These include pain medicines and antidepressants. Tell your doctor about all the medicines you take. Your doctor may want to make a medicine change to ease your symptoms. Follow-up care is a key part of your treatment and safety. Be sure to make and go to all appointments, and call your doctor if you are having problems. It's also a good idea to know your test results and keep a list of the medicines you take. How can you care for yourself at home? · Drink plenty of fluids, enough so that your urine is light yellow or clear like water. If you have kidney, heart, or liver disease and have to limit fluids, talk with your doctor before you increase the amount of fluids you drink. · Include high-fiber foods in your diet each day. These include fruits, vegetables, beans, and whole grains. · Get at least 30 minutes of exercise on most days of the week. Walking is a good choice. You also may want to do other activities, such as running, swimming, cycling, or playing tennis or team sports. · Take a fiber supplement, such as Citrucel or Metamucil, every day. Read and follow all instructions on the label. · Schedule time each day for a bowel movement. A daily routine may help.  Take your time having your bowel movement. · Support your feet with a small step stool when you sit on the toilet. This helps flex your hips and places your pelvis in a squatting position. · Your doctor may recommend an over-the-counter laxative to relieve your constipation. Examples are Milk of Magnesia and MiraLax. Read and follow all instructions on the label. Do not use laxatives on a long-term basis. When should you call for help? Call your doctor now or seek immediate medical care if:  · You have new or worse belly pain. · You have new or worse nausea or vomiting. · You have blood in your stools. Watch closely for changes in your health, and be sure to contact your doctor if:  · Your constipation is getting worse. · You do not get better as expected. Where can you learn more? Go to http://www.morales.com/  Enter P343 in the search box to learn more about \"Constipation: Care Instructions. \"  Current as of: June 26, 2019               Content Version: 12.5  © 0388-3221 [employer]. Care instructions adapted under license by [employer] (which disclaims liability or warranty for this information). If you have questions about a medical condition or this instruction, always ask your healthcare professional. [address] any warranty or liability for your use of this information. Patient Education        Constipation: Care Instructions  Your Care Instructions     Constipation means that you have a hard time passing stools (bowel movements). People pass stools from 3 times a day to once every 3 days. What is normal for you may be different. Constipation may occur with pain in the rectum and cramping. The pain may get worse when you try to pass stools. Sometimes there are small amounts of bright red blood on toilet paper or the surface of stools. This is because of enlarged veins near the rectum (hemorrhoids).   A few changes in your diet and lifestyle may help you avoid ongoing constipation. Your doctor may also prescribe medicine to help loosen your stool. Some medicines can cause constipation. These include pain medicines and antidepressants. Tell your doctor about all the medicines you take. Your doctor may want to make a medicine change to ease your symptoms. Follow-up care is a key part of your treatment and safety. Be sure to make and go to all appointments, and call your doctor if you are having problems. It's also a good idea to know your test results and keep a list of the medicines you take. How can you care for yourself at home? · Drink plenty of fluids, enough so that your urine is light yellow or clear like water. If you have kidney, heart, or liver disease and have to limit fluids, talk with your doctor before you increase the amount of fluids you drink. · Include high-fiber foods in your diet each day. These include fruits, vegetables, beans, and whole grains. · Get at least 30 minutes of exercise on most days of the week. Walking is a good choice. You also may want to do other activities, such as running, swimming, cycling, or playing tennis or team sports. · Take a fiber supplement, such as Citrucel or Metamucil, every day. Read and follow all instructions on the label. · Schedule time each day for a bowel movement. A daily routine may help. Take your time having your bowel movement. · Support your feet with a small step stool when you sit on the toilet. This helps flex your hips and places your pelvis in a squatting position. · Your doctor may recommend an over-the-counter laxative to relieve your constipation. Examples are Milk of Magnesia and MiraLax. Read and follow all instructions on the label. Do not use laxatives on a long-term basis. When should you call for help? Call your doctor now or seek immediate medical care if:  · You have new or worse belly pain. · You have new or worse nausea or vomiting.   · You have blood in your stools. Watch closely for changes in your health, and be sure to contact your doctor if:  · Your constipation is getting worse. · You do not get better as expected. Where can you learn more? Go to http://marita-venecia.info/  Enter P343 in the search box to learn more about \"Constipation: Care Instructions. \"  Current as of: June 26, 2019               Content Version: 12.5  © 7102-3432 [employer], [employer]. Care instructions adapted under license by [employer] (which disclaims liability or warranty for this information). If you have questions about a medical condition or this instruction, always ask your healthcare professional. [address] any warranty or liability for your use of this information.

## 2020-07-07 NOTE — ED TRIAGE NOTES
Patient presents to ER for C/O severe stabbing upper abdominal pain x a couple hours, c/o nausea and vomiting, Denies any diarrhea.

## 2020-07-07 NOTE — ED NOTES
Assumed care after arrival to Bed 11. Introduced myself as Primary Nurse. Encouraged to voice any concerns and/or change in their condition. Call bell in easy reach. Patient's fall risk assessed, bed locked and in lowest position. Lights are on in room and area around bed dry and free of clutter. Informed staff will be making rounds every half hour or sooner if condition warrants. Will explain tests and reason why they are to be done. Patient knows that as soon as MD reviews results he will be in to update patient. Current CDC guidelines followed upon entering room: Mask, goggles and gloves worn when entering room. Strict handwashing observed after removing gloves after this encounter with patient.

## 2020-07-07 NOTE — ED NOTES
Pt resting in bed post scan. Lying in bed w/o complaints of pain but rates pain \"8\" on 1/10 scale. No vomiting since arrival to [address] [place] [address].
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
## 2020-07-10 ENCOUNTER — APPOINTMENT (OUTPATIENT)
Dept: GENERAL RADIOLOGY | Age: 45
DRG: 330 | End: 2020-07-10
Attending: EMERGENCY MEDICINE
Payer: COMMERCIAL

## 2020-07-10 ENCOUNTER — APPOINTMENT (OUTPATIENT)
Dept: CT IMAGING | Age: 45
DRG: 330 | End: 2020-07-10
Attending: EMERGENCY MEDICINE
Payer: COMMERCIAL

## 2020-07-10 ENCOUNTER — HOSPITAL ENCOUNTER (INPATIENT)
Age: 45
LOS: 14 days | Discharge: HOME HEALTH | DRG: 330 | End: 2020-07-24
Attending: EMERGENCY MEDICINE | Admitting: SURGERY
Payer: COMMERCIAL

## 2020-07-10 DIAGNOSIS — E86.0 DEHYDRATION: ICD-10-CM

## 2020-07-10 DIAGNOSIS — K56.609 SBO (SMALL BOWEL OBSTRUCTION) (HCC): Primary | ICD-10-CM

## 2020-07-10 LAB
ALBUMIN SERPL-MCNC: 4.3 G/DL (ref 3.4–5)
ALBUMIN/GLOB SERPL: 1.1 {RATIO} (ref 0.8–1.7)
ALP SERPL-CCNC: 85 U/L (ref 45–117)
ALT SERPL-CCNC: 115 U/L (ref 13–56)
AMYLASE SERPL-CCNC: 37 U/L (ref 25–115)
ANION GAP SERPL CALC-SCNC: 8 MMOL/L (ref 3–18)
AST SERPL-CCNC: 58 U/L (ref 10–38)
BASOPHILS # BLD: 0 K/UL (ref 0–0.1)
BASOPHILS NFR BLD: 0 % (ref 0–2)
BILIRUB SERPL-MCNC: 1.2 MG/DL (ref 0.2–1)
BUN SERPL-MCNC: 32 MG/DL (ref 7–18)
BUN/CREAT SERPL: 35 (ref 12–20)
CALCIUM SERPL-MCNC: 9.2 MG/DL (ref 8.5–10.1)
CHLORIDE SERPL-SCNC: 97 MMOL/L (ref 100–111)
CO2 SERPL-SCNC: 28 MMOL/L (ref 21–32)
CREAT SERPL-MCNC: 0.92 MG/DL (ref 0.6–1.3)
DIFFERENTIAL METHOD BLD: ABNORMAL
EOSINOPHIL # BLD: 0 K/UL (ref 0–0.4)
EOSINOPHIL NFR BLD: 0 % (ref 0–5)
ERYTHROCYTE [DISTWIDTH] IN BLOOD BY AUTOMATED COUNT: 13.2 % (ref 11.6–14.5)
GLOBULIN SER CALC-MCNC: 4 G/DL (ref 2–4)
GLUCOSE SERPL-MCNC: 125 MG/DL (ref 74–99)
HCG SERPL QL: NEGATIVE
HCT VFR BLD AUTO: 44.4 % (ref 35–45)
HGB BLD-MCNC: 14.6 G/DL (ref 12–16)
LIPASE SERPL-CCNC: 80 U/L (ref 73–393)
LYMPHOCYTES # BLD: 1.4 K/UL (ref 0.9–3.6)
LYMPHOCYTES NFR BLD: 13 % (ref 21–52)
MAGNESIUM SERPL-MCNC: 2.2 MG/DL (ref 1.6–2.6)
MCH RBC QN AUTO: 27.1 PG (ref 24–34)
MCHC RBC AUTO-ENTMCNC: 32.9 G/DL (ref 31–37)
MCV RBC AUTO: 82.5 FL (ref 74–97)
MONOCYTES # BLD: 0.9 K/UL (ref 0.05–1.2)
MONOCYTES NFR BLD: 8 % (ref 3–10)
NEUTS SEG # BLD: 8.3 K/UL (ref 1.8–8)
NEUTS SEG NFR BLD: 79 % (ref 40–73)
PLATELET # BLD AUTO: 406 K/UL (ref 135–420)
PMV BLD AUTO: 9.9 FL (ref 9.2–11.8)
POTASSIUM SERPL-SCNC: 3.6 MMOL/L (ref 3.5–5.5)
PROT SERPL-MCNC: 8.3 G/DL (ref 6.4–8.2)
RBC # BLD AUTO: 5.38 M/UL (ref 4.2–5.3)
SODIUM SERPL-SCNC: 133 MMOL/L (ref 136–145)
WBC # BLD AUTO: 10.7 K/UL (ref 4.6–13.2)

## 2020-07-10 PROCEDURE — 85025 COMPLETE CBC W/AUTO DIFF WBC: CPT

## 2020-07-10 PROCEDURE — 74011250636 HC RX REV CODE- 250/636: Performed by: EMERGENCY MEDICINE

## 2020-07-10 PROCEDURE — 96361 HYDRATE IV INFUSION ADD-ON: CPT

## 2020-07-10 PROCEDURE — 65270000029 HC RM PRIVATE

## 2020-07-10 PROCEDURE — 74011250636 HC RX REV CODE- 250/636: Performed by: FAMILY MEDICINE

## 2020-07-10 PROCEDURE — 96375 TX/PRO/DX INJ NEW DRUG ADDON: CPT

## 2020-07-10 PROCEDURE — 96374 THER/PROPH/DIAG INJ IV PUSH: CPT

## 2020-07-10 PROCEDURE — 83690 ASSAY OF LIPASE: CPT

## 2020-07-10 PROCEDURE — 99285 EMERGENCY DEPT VISIT HI MDM: CPT

## 2020-07-10 PROCEDURE — 74177 CT ABD & PELVIS W/CONTRAST: CPT

## 2020-07-10 PROCEDURE — 74011250636 HC RX REV CODE- 250/636: Performed by: SURGERY

## 2020-07-10 PROCEDURE — 74011636320 HC RX REV CODE- 636/320: Performed by: EMERGENCY MEDICINE

## 2020-07-10 PROCEDURE — 84703 CHORIONIC GONADOTROPIN ASSAY: CPT

## 2020-07-10 PROCEDURE — 80053 COMPREHEN METABOLIC PANEL: CPT

## 2020-07-10 PROCEDURE — 83735 ASSAY OF MAGNESIUM: CPT

## 2020-07-10 PROCEDURE — 0D9670Z DRAINAGE OF STOMACH WITH DRAINAGE DEVICE, VIA NATURAL OR ARTIFICIAL OPENING: ICD-10-PCS | Performed by: SURGERY

## 2020-07-10 PROCEDURE — 74019 RADEX ABDOMEN 2 VIEWS: CPT

## 2020-07-10 PROCEDURE — 82150 ASSAY OF AMYLASE: CPT

## 2020-07-10 RX ORDER — DEXTROSE, SODIUM CHLORIDE, AND POTASSIUM CHLORIDE 5; .45; .15 G/100ML; G/100ML; G/100ML
25 INJECTION INTRAVENOUS CONTINUOUS
Status: DISCONTINUED | OUTPATIENT
Start: 2020-07-10 | End: 2020-07-23

## 2020-07-10 RX ORDER — DIPHENHYDRAMINE HYDROCHLORIDE 50 MG/ML
12.5 INJECTION, SOLUTION INTRAMUSCULAR; INTRAVENOUS
Status: DISCONTINUED | OUTPATIENT
Start: 2020-07-10 | End: 2020-07-24 | Stop reason: HOSPADM

## 2020-07-10 RX ORDER — SODIUM CHLORIDE 9 MG/ML
100 INJECTION, SOLUTION INTRAVENOUS ONCE
Status: COMPLETED | OUTPATIENT
Start: 2020-07-10 | End: 2020-07-10

## 2020-07-10 RX ORDER — HYDROMORPHONE HYDROCHLORIDE 1 MG/ML
1 INJECTION, SOLUTION INTRAMUSCULAR; INTRAVENOUS; SUBCUTANEOUS
Status: DISCONTINUED | OUTPATIENT
Start: 2020-07-10 | End: 2020-07-14

## 2020-07-10 RX ORDER — LORAZEPAM 2 MG/ML
1 INJECTION INTRAMUSCULAR ONCE
Status: COMPLETED | OUTPATIENT
Start: 2020-07-10 | End: 2020-07-10

## 2020-07-10 RX ORDER — SODIUM CHLORIDE 0.9 % (FLUSH) 0.9 %
5-40 SYRINGE (ML) INJECTION AS NEEDED
Status: DISCONTINUED | OUTPATIENT
Start: 2020-07-10 | End: 2020-07-24 | Stop reason: HOSPADM

## 2020-07-10 RX ORDER — ONDANSETRON 2 MG/ML
4 INJECTION INTRAMUSCULAR; INTRAVENOUS
Status: COMPLETED | OUTPATIENT
Start: 2020-07-10 | End: 2020-07-10

## 2020-07-10 RX ORDER — KETOROLAC TROMETHAMINE 30 MG/ML
15 INJECTION, SOLUTION INTRAMUSCULAR; INTRAVENOUS ONCE
Status: COMPLETED | OUTPATIENT
Start: 2020-07-10 | End: 2020-07-10

## 2020-07-10 RX ORDER — SODIUM CHLORIDE 0.9 % (FLUSH) 0.9 %
5-40 SYRINGE (ML) INJECTION EVERY 8 HOURS
Status: DISCONTINUED | OUTPATIENT
Start: 2020-07-10 | End: 2020-07-17 | Stop reason: SDUPTHER

## 2020-07-10 RX ORDER — ONDANSETRON 2 MG/ML
4 INJECTION INTRAMUSCULAR; INTRAVENOUS
Status: DISCONTINUED | OUTPATIENT
Start: 2020-07-10 | End: 2020-07-24 | Stop reason: HOSPADM

## 2020-07-10 RX ORDER — LORAZEPAM 2 MG/ML
1 INJECTION INTRAMUSCULAR
Status: DISCONTINUED | OUTPATIENT
Start: 2020-07-10 | End: 2020-07-14

## 2020-07-10 RX ORDER — KETOROLAC TROMETHAMINE 30 MG/ML
30 INJECTION, SOLUTION INTRAMUSCULAR; INTRAVENOUS
Status: DISPENSED | OUTPATIENT
Start: 2020-07-10 | End: 2020-07-11

## 2020-07-10 RX ADMIN — ONDANSETRON 4 MG: 2 INJECTION INTRAMUSCULAR; INTRAVENOUS at 13:20

## 2020-07-10 RX ADMIN — LORAZEPAM 1 MG: 2 INJECTION, SOLUTION INTRAMUSCULAR; INTRAVENOUS at 16:50

## 2020-07-10 RX ADMIN — SODIUM CHLORIDE 1000 ML: 900 INJECTION, SOLUTION INTRAVENOUS at 15:01

## 2020-07-10 RX ADMIN — SODIUM CHLORIDE 1000 ML: 900 INJECTION, SOLUTION INTRAVENOUS at 13:20

## 2020-07-10 RX ADMIN — IOPAMIDOL 100 ML: 612 INJECTION, SOLUTION INTRAVENOUS at 14:30

## 2020-07-10 RX ADMIN — LORAZEPAM 1 MG: 2 INJECTION INTRAMUSCULAR at 21:36

## 2020-07-10 RX ADMIN — DEXTROSE MONOHYDRATE, SODIUM CHLORIDE, AND POTASSIUM CHLORIDE 125 ML/HR: 50; 4.5; 1.49 INJECTION, SOLUTION INTRAVENOUS at 18:27

## 2020-07-10 RX ADMIN — SODIUM CHLORIDE 100 ML/HR: 900 INJECTION, SOLUTION INTRAVENOUS at 16:50

## 2020-07-10 RX ADMIN — Medication 10 ML: at 18:00

## 2020-07-10 RX ADMIN — KETOROLAC TROMETHAMINE 15 MG: 30 INJECTION, SOLUTION INTRAMUSCULAR at 15:01

## 2020-07-10 RX ADMIN — Medication 10 ML: at 21:36

## 2020-07-10 NOTE — ED NOTES
TRANSFER - OUT REPORT:    Verbal report given to Deniz Kimble (name) on Liseth Pickard  being transferred to medical (unit) for routine progression of care       Report consisted of patients Situation, Background, Assessment and   Recommendations(SBAR). Information from the following report(s) SBAR, ED Summary, Procedure Summary, MAR and Recent Results was reviewed with the receiving nurse. Lines:   Peripheral IV 07/10/20 Right Antecubital (Active)   Site Assessment Clean, dry, & intact 07/10/20 1252   Phlebitis Assessment 0 07/10/20 1252   Infiltration Assessment 0 07/10/20 1252   Dressing Status Clean, dry, & intact 07/10/20 1252   Dressing Type Transparent;Tape 07/10/20 1252   Hub Color/Line Status Pink 07/10/20 1252   Action Taken Blood drawn 07/10/20 1252   Alcohol Cap Used Yes 07/10/20 1252        Opportunity for questions and clarification was provided.       Patient transported with:   Re[employer]

## 2020-07-10 NOTE — ED TRIAGE NOTES
Patient with complaints of vomiting for 3 days and states that she is dehydrated.  Patient sent by Dr. Allyssa Rossi
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
## 2020-07-11 LAB
ANION GAP SERPL CALC-SCNC: 3 MMOL/L (ref 3–18)
BASOPHILS # BLD: 0 K/UL (ref 0–0.1)
BASOPHILS NFR BLD: 0 % (ref 0–2)
BUN SERPL-MCNC: 25 MG/DL (ref 7–18)
BUN/CREAT SERPL: 36 (ref 12–20)
CALCIUM SERPL-MCNC: 7.8 MG/DL (ref 8.5–10.1)
CHLORIDE SERPL-SCNC: 103 MMOL/L (ref 100–111)
CO2 SERPL-SCNC: 33 MMOL/L (ref 21–32)
CREAT SERPL-MCNC: 0.7 MG/DL (ref 0.6–1.3)
DIFFERENTIAL METHOD BLD: NORMAL
EOSINOPHIL # BLD: 0.1 K/UL (ref 0–0.4)
EOSINOPHIL NFR BLD: 2 % (ref 0–5)
ERYTHROCYTE [DISTWIDTH] IN BLOOD BY AUTOMATED COUNT: 13.3 % (ref 11.6–14.5)
GLUCOSE BLD STRIP.AUTO-MCNC: 118 MG/DL (ref 70–110)
GLUCOSE BLD STRIP.AUTO-MCNC: 124 MG/DL (ref 70–110)
GLUCOSE SERPL-MCNC: 123 MG/DL (ref 74–99)
HCT VFR BLD AUTO: 37.4 % (ref 35–45)
HGB BLD-MCNC: 12.1 G/DL (ref 12–16)
LYMPHOCYTES # BLD: 1.4 K/UL (ref 0.9–3.6)
LYMPHOCYTES NFR BLD: 23 % (ref 21–52)
MCH RBC QN AUTO: 27.3 PG (ref 24–34)
MCHC RBC AUTO-ENTMCNC: 32.4 G/DL (ref 31–37)
MCV RBC AUTO: 84.4 FL (ref 74–97)
MONOCYTES # BLD: 0.6 K/UL (ref 0.05–1.2)
MONOCYTES NFR BLD: 10 % (ref 3–10)
NEUTS SEG # BLD: 3.8 K/UL (ref 1.8–8)
NEUTS SEG NFR BLD: 65 % (ref 40–73)
PLATELET # BLD AUTO: 238 K/UL (ref 135–420)
PMV BLD AUTO: 9.6 FL (ref 9.2–11.8)
POTASSIUM SERPL-SCNC: 3.5 MMOL/L (ref 3.5–5.5)
RBC # BLD AUTO: 4.43 M/UL (ref 4.2–5.3)
SODIUM SERPL-SCNC: 139 MMOL/L (ref 136–145)
WBC # BLD AUTO: 6 K/UL (ref 4.6–13.2)

## 2020-07-11 PROCEDURE — 74011250636 HC RX REV CODE- 250/636: Performed by: SURGERY

## 2020-07-11 PROCEDURE — 51798 US URINE CAPACITY MEASURE: CPT

## 2020-07-11 PROCEDURE — 74011250636 HC RX REV CODE- 250/636: Performed by: OBSTETRICS & GYNECOLOGY

## 2020-07-11 PROCEDURE — 85025 COMPLETE CBC W/AUTO DIFF WBC: CPT

## 2020-07-11 PROCEDURE — 82962 GLUCOSE BLOOD TEST: CPT

## 2020-07-11 PROCEDURE — 74011636637 HC RX REV CODE- 636/637: Performed by: SURGERY

## 2020-07-11 PROCEDURE — 65270000029 HC RM PRIVATE

## 2020-07-11 PROCEDURE — 36415 COLL VENOUS BLD VENIPUNCTURE: CPT

## 2020-07-11 PROCEDURE — 74011250636 HC RX REV CODE- 250/636: Performed by: FAMILY MEDICINE

## 2020-07-11 PROCEDURE — 80048 BASIC METABOLIC PNL TOTAL CA: CPT

## 2020-07-11 RX ORDER — KETOROLAC TROMETHAMINE 30 MG/ML
30 INJECTION, SOLUTION INTRAMUSCULAR; INTRAVENOUS
Status: COMPLETED | OUTPATIENT
Start: 2020-07-11 | End: 2020-07-11

## 2020-07-11 RX ORDER — BUPRENORPHINE HYDROCHLORIDE 8 MG/1
8 TABLET SUBLINGUAL DAILY
Status: DISCONTINUED | OUTPATIENT
Start: 2020-07-11 | End: 2020-07-14

## 2020-07-11 RX ADMIN — DEXTROSE MONOHYDRATE, SODIUM CHLORIDE, AND POTASSIUM CHLORIDE 125 ML/HR: 50; 4.5; 1.49 INJECTION, SOLUTION INTRAVENOUS at 20:38

## 2020-07-11 RX ADMIN — Medication 10 ML: at 13:06

## 2020-07-11 RX ADMIN — BUPRENORPHINE HCL 8 MG: 8 TABLET SUBLINGUAL at 15:44

## 2020-07-11 RX ADMIN — LORAZEPAM 1 MG: 2 INJECTION INTRAMUSCULAR at 12:53

## 2020-07-11 RX ADMIN — KETOROLAC TROMETHAMINE 30 MG: 30 INJECTION, SOLUTION INTRAMUSCULAR at 00:19

## 2020-07-11 RX ADMIN — DEXTROSE MONOHYDRATE, SODIUM CHLORIDE, AND POTASSIUM CHLORIDE 125 ML/HR: 50; 4.5; 1.49 INJECTION, SOLUTION INTRAVENOUS at 12:53

## 2020-07-11 RX ADMIN — SODIUM CHLORIDE, SODIUM LACTATE, POTASSIUM CHLORIDE, AND CALCIUM CHLORIDE 1000 ML: 600; 310; 30; 20 INJECTION, SOLUTION INTRAVENOUS at 10:31

## 2020-07-11 RX ADMIN — KETOROLAC TROMETHAMINE 30 MG: 30 INJECTION, SOLUTION INTRAMUSCULAR at 10:28

## 2020-07-11 RX ADMIN — Medication 10 ML: at 05:05

## 2020-07-11 RX ADMIN — LORAZEPAM 1 MG: 2 INJECTION INTRAMUSCULAR at 20:20

## 2020-07-11 RX ADMIN — DEXTROSE MONOHYDRATE, SODIUM CHLORIDE, AND POTASSIUM CHLORIDE 125 ML/HR: 50; 4.5; 1.49 INJECTION, SOLUTION INTRAVENOUS at 02:35

## 2020-07-11 RX ADMIN — KETOROLAC TROMETHAMINE 30 MG: 30 INJECTION, SOLUTION INTRAMUSCULAR; INTRAVENOUS at 22:12

## 2020-07-11 RX ADMIN — Medication 10 ML: at 21:18

## 2020-07-11 RX ADMIN — SODIUM CHLORIDE, SODIUM LACTATE, POTASSIUM CHLORIDE, AND CALCIUM CHLORIDE 500 ML: 600; 310; 30; 20 INJECTION, SOLUTION INTRAVENOUS at 11:56

## 2020-07-11 RX ADMIN — HYDROMORPHONE HYDROCHLORIDE 1 MG: 1 INJECTION, SOLUTION INTRAMUSCULAR; INTRAVENOUS; SUBCUTANEOUS at 20:28

## 2020-07-11 RX ADMIN — KETOROLAC TROMETHAMINE 30 MG: 30 INJECTION, SOLUTION INTRAMUSCULAR at 16:54

## 2020-07-11 NOTE — PROGRESS NOTES
1720  TRANSFER - IN REPORT:    Verbal report received from Jamil Lobato RN (name) on Alice Pearson  being received from ED (unit) for routine progression of care      Report consisted of patients Situation, Background, Assessment and   Recommendations(SBAR). Information from the following report(s) SBAR, Intake/Output, MAR and Recent Results was reviewed with the receiving nurse. Opportunity for questions and clarification was provided. Assessment completed upon patients arrival to unit and care assumed. 1830  Pt is stable. No complaints of pain or nausea. PEG tube output was 550 mL.    1920  Bedside and Verbal shift change report given by Aurora Quiroz RN (off going nurse) to [facility] (on coming nurse). Report included the following information SBAR, Kardex, OR Summary, Intake/Output and MAR.     Patient Vitals for the past 12 hrs:   Temp Pulse Resp BP SpO2   07/10/20 1927 98.6 °F (37 °C) 84 18 128/63 100 %   07/10/20 1623 97.8 °F (36.6 °C) 87 18 133/74 100 %   07/10/20 1400 -- -- -- 127/69 100 %   07/10/20 1330 -- -- -- 129/81 99 %   07/10/20 1325 -- -- 18 125/63 99 %   07/10/20 1324 -- -- -- 125/63 99 %   07/10/20 1229 98.2 °F (36.8 °C) (!) 125 20 141/84 --
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
## 2020-07-11 NOTE — PROGRESS NOTES
Plan of Care    Neuro: A/O x4, ambulatory. CV: non tele, BP stable  Resp: Clear on RA  GI/: Bathroom privileges, Has not voided in 12 hours, bladder scan showed 327 ml, per patient she states she doesn't urinate often but when she goes it is a lot. Last BM 7/5. NGT present connect to LIWS. NPO, okay to have ice chips. Skin:Intact, has abdominal scar from old surgery. Problem: Falls - Risk of  Goal: *Absence of Falls  Description: Document Hardeep Ellison Fall Risk and appropriate interventions in the flowsheet.   Outcome: Progressing Towards Goal  Note: Fall Risk Interventions:            Medication Interventions: Teach patient to arise slowly, Patient to call before getting OOB         History of Falls Interventions: Consult care management for discharge planning         Problem: Patient Education: Go to Patient Education Activity  Goal: Patient/Family Education  Outcome: Progressing Towards Goal

## 2020-07-11 NOTE — PROGRESS NOTES
DC Plan: TBD, HH? Chart reviewed as CM on call. Pt admitted to medical by MD Lopez. MD Quang Wynn also following. Noted pt has NG tube. Met with pt at bedside to discuss dc plan. CM role explained. Pt states she is independent and lives with . Denies currently using any DME or [facility] services, although she does state she used HH in past for wound care. PCP confirmed per face sheet. Pt voices no dc concerns at this time. CM will cont to follow for transition of care needs and will be available via hospital  on weekends. Reason for Admission:   Per H&P pt is \"is a 38 y.o. female who presents with nausea /abd pain p SBO on CT, previous perforated appy and uterine fibroids. \"                   RUR Score:      12%               Plan for utilizing home health:     TBD, Provider if [facility] needed please place order     PCP: First and Last name:  MD Beckford   Name of Practice:    Are you a current patient: Yes/No: yes   Approximate date of last visit:    Can you participate in a virtual visit with your PCP:                     Current Advanced Directive/Advance Care Plan:                          Transition of Care Plan:   TBD                   Care Management Interventions  PCP Verified by CM: Yes  Mode of Transport at Discharge:  Other (see comment)(family)  Current Support Network: Lives with Spouse  Confirm Follow Up Transport: Self(or family)
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
## 2020-07-11 NOTE — PROGRESS NOTES
Problem: Falls - Risk of  Goal: *Absence of Falls  Description: Document Ander Alfredo Fall Risk and appropriate interventions in the flowsheet.   Outcome: Progressing Towards Goal  Note: Fall Risk Interventions:            Medication Interventions: Teach patient to arise slowly, Patient to call before getting OOB         History of Falls Interventions: Consult care management for discharge planning         Problem: Patient Education: Go to Patient Education Activity  Goal: Patient/Family Education  Outcome: Progressing Towards Goal

## 2020-07-12 ENCOUNTER — APPOINTMENT (OUTPATIENT)
Dept: GENERAL RADIOLOGY | Age: 45
DRG: 330 | End: 2020-07-12
Attending: SURGERY
Payer: COMMERCIAL

## 2020-07-12 PROCEDURE — 74011250636 HC RX REV CODE- 250/636: Performed by: SURGERY

## 2020-07-12 PROCEDURE — 74011636637 HC RX REV CODE- 636/637: Performed by: SURGERY

## 2020-07-12 PROCEDURE — 74011000250 HC RX REV CODE- 250: Performed by: OBSTETRICS & GYNECOLOGY

## 2020-07-12 PROCEDURE — 74011250637 HC RX REV CODE- 250/637: Performed by: OBSTETRICS & GYNECOLOGY

## 2020-07-12 PROCEDURE — 74011250637 HC RX REV CODE- 250/637: Performed by: SURGERY

## 2020-07-12 PROCEDURE — 74011250636 HC RX REV CODE- 250/636: Performed by: FAMILY MEDICINE

## 2020-07-12 PROCEDURE — 65270000029 HC RM PRIVATE

## 2020-07-12 RX ORDER — KETOROLAC TROMETHAMINE 30 MG/ML
30 INJECTION, SOLUTION INTRAMUSCULAR; INTRAVENOUS
Status: COMPLETED | OUTPATIENT
Start: 2020-07-12 | End: 2020-07-12

## 2020-07-12 RX ORDER — KETOROLAC TROMETHAMINE 30 MG/ML
30 INJECTION, SOLUTION INTRAMUSCULAR; INTRAVENOUS
Status: DISPENSED | OUTPATIENT
Start: 2020-07-12 | End: 2020-07-17

## 2020-07-12 RX ORDER — ACETAMINOPHEN 325 MG/1
650 TABLET ORAL
Status: DISCONTINUED | OUTPATIENT
Start: 2020-07-12 | End: 2020-07-19

## 2020-07-12 RX ORDER — LIDOCAINE HYDROCHLORIDE 20 MG/ML
JELLY TOPICAL AS NEEDED
Status: DISCONTINUED | OUTPATIENT
Start: 2020-07-12 | End: 2020-07-24 | Stop reason: HOSPADM

## 2020-07-12 RX ADMIN — DEXTROSE MONOHYDRATE, SODIUM CHLORIDE, AND POTASSIUM CHLORIDE 125 ML/HR: 50; 4.5; 1.49 INJECTION, SOLUTION INTRAVENOUS at 04:27

## 2020-07-12 RX ADMIN — LORAZEPAM 1 MG: 2 INJECTION INTRAMUSCULAR at 11:48

## 2020-07-12 RX ADMIN — SODIUM PHOSPHATE, DIBASIC AND SODIUM PHOSPHATE, MONOBASIC 1 ENEMA: 7; 19 ENEMA RECTAL at 11:52

## 2020-07-12 RX ADMIN — KETOROLAC TROMETHAMINE 30 MG: 30 INJECTION, SOLUTION INTRAMUSCULAR at 17:58

## 2020-07-12 RX ADMIN — LIDOCAINE HYDROCHLORIDE: 20 JELLY TOPICAL at 16:02

## 2020-07-12 RX ADMIN — ACETAMINOPHEN 650 MG: 325 TABLET ORAL at 16:02

## 2020-07-12 RX ADMIN — ONDANSETRON HYDROCHLORIDE 4 MG: 2 INJECTION INTRAMUSCULAR; INTRAVENOUS at 23:18

## 2020-07-12 RX ADMIN — Medication 10 ML: at 06:00

## 2020-07-12 RX ADMIN — KETOROLAC TROMETHAMINE 30 MG: 30 INJECTION, SOLUTION INTRAMUSCULAR at 11:48

## 2020-07-12 RX ADMIN — LORAZEPAM 1 MG: 2 INJECTION INTRAMUSCULAR at 02:42

## 2020-07-12 RX ADMIN — Medication 10 ML: at 14:00

## 2020-07-12 RX ADMIN — LIDOCAINE HYDROCHLORIDE: 20 JELLY TOPICAL at 11:50

## 2020-07-12 RX ADMIN — KETOROLAC TROMETHAMINE 30 MG: 30 INJECTION, SOLUTION INTRAMUSCULAR; INTRAVENOUS at 04:59

## 2020-07-12 RX ADMIN — DEXTROSE MONOHYDRATE, SODIUM CHLORIDE, AND POTASSIUM CHLORIDE 125 ML/HR: 50; 4.5; 1.49 INJECTION, SOLUTION INTRAVENOUS at 12:36

## 2020-07-12 RX ADMIN — DEXTROSE MONOHYDRATE, SODIUM CHLORIDE, AND POTASSIUM CHLORIDE 125 ML/HR: 50; 4.5; 1.49 INJECTION, SOLUTION INTRAVENOUS at 20:35

## 2020-07-12 RX ADMIN — LORAZEPAM 1 MG: 2 INJECTION INTRAMUSCULAR at 17:58

## 2020-07-12 RX ADMIN — BUPRENORPHINE HCL 8 MG: 8 TABLET SUBLINGUAL at 08:50

## 2020-07-12 RX ADMIN — Medication 10 ML: at 21:01

## 2020-07-12 NOTE — PROGRESS NOTES
Problem: Falls - Risk of  Goal: *Absence of Falls  Description: Document Rikki Tejada Fall Risk and appropriate interventions in the flowsheet.   Outcome: Progressing Towards Goal  Note: Fall Risk Interventions:            Medication Interventions: Teach patient to arise slowly         History of Falls Interventions: Consult care management for discharge planning         Problem: Patient Education: Go to Patient Education Activity  Goal: Patient/Family Education  Outcome: Progressing Towards Goal

## 2020-07-12 NOTE — ROUTINE PROCESS
1926 Bedside and Verbal shift change report given to Amber Roberson RN (oncoming nurse) by YOSEPH Escobar RN (offgoing nurse). Report included the following information SBAR, Kardex, Intake/Output, MAR and Recent Results. 2020 Pt requested PRN anxiety med and C/O 4/10 left head and nose pain unrelieved by repositioning or dimmed lighting, PRN's administered will follow up on effectiveness. 2150 Pt C/O pain is still at a 4/10, spoke with Dr Azul Lui who gave Manasa Echevarria a one time order for Toradol 30 mg.     2307 Pt observed resting in bed with chest rise and fall noted and RR greater than 10.     0242 Pt requested PRN anxiety med for increased anxiety, PRN administered will follow up on effectiveness. [address] [facility] [address] with Dr Azul Lui R/T pt C/O 8/10 left nose, head and teeth pain, pt states Dilaudid does not work. One time order given for Toradol 30 mg IV again. Will follow up on effectiveness. [id number] Bedside and Verbal shift change report given to YOSEPH Escobar RN (oncoming nurse) by Amber Roberson RN (offgoing nurse). Report included the following information SBAR, Kardex, Intake/Output, MAR and Recent Results.

## 2020-07-12 NOTE — PROGRESS NOTES
AFVSS  Did have small BM  Abd - mild pain. Will need small bowel series to determine if this is really caused by adhesions or ileus secondary to pain from large fibroids. Toradol for pain - sinus pain secondary to NG, Hope to remove soon.   Enema

## 2020-07-12 NOTE — PROGRESS NOTES
Pt c/o a lot of pain from the ngt radiating into jaw and up to maxillary area. Refusing dilaudid.  Did have bm yesterday\  vss  ngt output decreased urine output ok  Will have dr mcelroy cover ngt management and will give lidocaine gel for her nose and tylenol

## 2020-07-12 NOTE — PROGRESS NOTES
Bedside and Verbal shift change report given to Michelle Mendez (oncoming nurse) by Haley Solano (offgoing nurse). Report included the following information SBAR, Kardex, Intake/Output and MAR. Patient A/OX4. Patient in bed resting quietly, No complaints at this time. 0850-Patient requesting something for pain. Will page doctor. Pt states dilaudid does not help, Would like Toradol. Dr. Kendra Cavazos come see Patient soon, No order for Toradol. 1100-Brit Holloway in to see patient. Verbal orders to clamp the NG tube, and administer lidocaine jelly to her nose, and tylenol. Orders placed . 1120-Dr. Lopez in to see patient. 1148-Patient requested something for pain and anxiety. Administered Toradol 30 MG IV. Pain 10/10, and Ativan 1 MG IV. Fleets enema performed at this time. 1605-Patient requested something for pain. Administered Tylenol 650 MG PO. Pain 6/10    1800-Patient requested something for pain and anxiety. Administered Toradol 30 MG IV. Pain 8/10, and Ativan 1 MG IV. Fleets enema performed at this time. SHIFT SUMMARY:  Pain medication 3X  Anxiety medication 2X  NG Tube clamped. Small bowel xray series ordered for tomorrow. 1922-Bedside and Verbal shift change report given to Haley Solano (oncoming nurse) by Michelle Mendez (offgoing nurse). Report included the following information SBAR, Kardex, Intake/Output and MAR.

## 2020-07-13 ENCOUNTER — APPOINTMENT (OUTPATIENT)
Dept: GENERAL RADIOLOGY | Age: 45
DRG: 330 | End: 2020-07-13
Attending: SURGERY
Payer: COMMERCIAL

## 2020-07-13 PROCEDURE — 74011636637 HC RX REV CODE- 636/637: Performed by: SURGERY

## 2020-07-13 PROCEDURE — 65270000029 HC RM PRIVATE

## 2020-07-13 PROCEDURE — 74018 RADEX ABDOMEN 1 VIEW: CPT

## 2020-07-13 PROCEDURE — 74011250636 HC RX REV CODE- 250/636: Performed by: SURGERY

## 2020-07-13 PROCEDURE — 74011250636 HC RX REV CODE- 250/636: Performed by: FAMILY MEDICINE

## 2020-07-13 RX ADMIN — KETOROLAC TROMETHAMINE 30 MG: 30 INJECTION, SOLUTION INTRAMUSCULAR at 18:59

## 2020-07-13 RX ADMIN — DEXTROSE MONOHYDRATE, SODIUM CHLORIDE, AND POTASSIUM CHLORIDE 125 ML/HR: 50; 4.5; 1.49 INJECTION, SOLUTION INTRAVENOUS at 12:51

## 2020-07-13 RX ADMIN — ONDANSETRON HYDROCHLORIDE 4 MG: 2 INJECTION INTRAMUSCULAR; INTRAVENOUS at 12:32

## 2020-07-13 RX ADMIN — KETOROLAC TROMETHAMINE 30 MG: 30 INJECTION, SOLUTION INTRAMUSCULAR at 00:05

## 2020-07-13 RX ADMIN — LORAZEPAM 1 MG: 2 INJECTION INTRAMUSCULAR at 00:05

## 2020-07-13 RX ADMIN — BUPRENORPHINE HCL 8 MG: 8 TABLET SUBLINGUAL at 09:47

## 2020-07-13 RX ADMIN — Medication 10 ML: at 15:30

## 2020-07-13 RX ADMIN — ONDANSETRON HYDROCHLORIDE 4 MG: 2 INJECTION INTRAMUSCULAR; INTRAVENOUS at 21:44

## 2020-07-13 RX ADMIN — KETOROLAC TROMETHAMINE 30 MG: 30 INJECTION, SOLUTION INTRAMUSCULAR at 12:49

## 2020-07-13 RX ADMIN — Medication 10 ML: at 22:00

## 2020-07-13 RX ADMIN — KETOROLAC TROMETHAMINE 30 MG: 30 INJECTION, SOLUTION INTRAMUSCULAR at 05:50

## 2020-07-13 RX ADMIN — LORAZEPAM 1 MG: 2 INJECTION INTRAMUSCULAR at 18:59

## 2020-07-13 RX ADMIN — Medication 10 ML: at 05:50

## 2020-07-13 RX ADMIN — LORAZEPAM 1 MG: 2 INJECTION INTRAMUSCULAR at 12:32

## 2020-07-13 RX ADMIN — LORAZEPAM 1 MG: 2 INJECTION INTRAMUSCULAR at 05:50

## 2020-07-13 RX ADMIN — ONDANSETRON HYDROCHLORIDE 4 MG: 2 INJECTION INTRAMUSCULAR; INTRAVENOUS at 04:21

## 2020-07-13 NOTE — PROGRESS NOTES
S: Radiology inability to perform Gastric study today/ NG Tube not in place as verified by Abdominal KUB in radiology  B: Radiology staff verbalized to nursing staff this AM that patient's NG Tube was not in place to complete the gastric study and that patient advancement and placement verification via abdominal KUB would need to be completed prior to patient having Gastric Study completed. At 1300 pm, patient's NG tube was advanced via left nares until secured in place with silk tape via bridge of patient's nose. Patient presented with STAT bedside portable abdominal KUB to be performed to verify placement. Radiology representative Tay was notified upon patient coming down for study. At 1328 pm, medical transport presented to bedside to bring patient down for STAT abdominal KUB as well as gastric study. At 1330 pm, Radiology department was contacted and Radiology representative Veronica Kaiser was informed that patient needed to have STAT portable bedside abdominal KUB performed prior to coming down for gastric study so that if nursing staff needed to further advance NG tube within place, it could be immediately indicated and done with bedside portable KUB. Radiology representative Veronica Kaiser was informed that if patient were to come down stairs to have abdominal KUB performed to verify placement, that floor nursing staff would not be there to advance line into place, which would further postpone/delay gastric study to be performed. Radiology representative Veronica Kaiser indicated that Radiologist Dr. Keanu Mccabe still wanted to proceed with bringing patient down to radiology department for abdominal KUB to verify NG tube placement as well as gastric study. Understanding was verbalized.  Patient and charge nurse were notified about Dr. Harrison Trinidad initial order to perform bedside portable abdominal KUB to verify placement upon advancing NG Tube and Radiologist Dr. Franky Gil order to bring down patient for Abdominal KUB to verify placement and gastric study. All parties verbalized understanding. Patient left unit via wheelchair around 1331 pm. At 1358 pm, radiology staff stated that patient's NG tube still presented within the patient's lung and that gastric study would not be able to be performed today. Understanding was verbalized and Radiology staff was informed that Dr. Irene Hernández would be notified. A: At 1356 pm, a  page was sent to Dr. Irene Hernández to notify him of Radiology staff's inability to perform gastric study due to NG tube still presenting in patient's lung. Additionally, he will be notified about radiology's staff stating that patient's Gastric Study would be delayed due to inability to verify placement. At 1402 pm, Dr. Irene Hernández contacted unit and was notified about Radiology staff's changing portable abdominal KUB to off unit abdominal KUB even though they were notified that nursing staff would not be available directly to advance tube. Dr. Jose Elena was informed that Radiology staff stated that they would not be able to do gastric study today due NG tube within lung. DR. Lopez provided verbal order with readback to re-advance line and to have another abdominal KUB to verify placement of NG tube. Dr. Jose Elena stated that patient could have gastric study Tuesday (07/14/2020) or Wednesday (07/15/2020). Understanding was verbalized. R: Will continue with prescribed plan of care as directed.    Nomi Iqbal  7/13/2020  2:08 PM

## 2020-07-13 NOTE — PROGRESS NOTES
S: Readjustment of NG tube by Dr. Anna Gama according to Charge RN. B: Patient presented with order via Dr. Anna Gama to replace NG tube and to have tube verified via KUB abdominal to ensure that NG tube was in place. A: Charge RN Marylou Engle stated that Dr. Azul Lui presented to unit an readjusted NG tube to the correct location and placed on medium suction. Charge RN Marylou Engle verbalized that Dr. Azul Lui instructed nursing staff not to touch NG tube after he repositioned it and placed it on medium suction. Understanding was verbalized. R: Will continue with prescribed plan of care as directed.    Nomi Iqbal  7/13/2020  4:53 PM

## 2020-07-13 NOTE — PROGRESS NOTES
Problem: Falls - Risk of  Goal: *Absence of Falls  Description: Document Timmy Wyman Fall Risk and appropriate interventions in the flowsheet.   Outcome: Progressing Towards Goal  Note: Fall Risk Interventions:            Medication Interventions: Teach patient to arise slowly, Patient to call before getting OOB         History of Falls Interventions: Consult care management for discharge planning

## 2020-07-13 NOTE — PROGRESS NOTES
S: MD Contacted and notified about advancing NG tube and verification of placement via portable x ray. B: Radiology contacted nursing floor this am in accordance to inability to complete bowel study due to patient's NG tube via left nare not advanced within the stomach. Radiology stated that NG tube would need to advanced by nursing staff and have confirmation abdominal X ray to confirm placement prior to abdominal gastric study could be performed. Radiology stated to notify them once placement had been confirmed and placed so that they could complete gastric study. Understanding was verbalized and Radiology staff was informed that order would be retrieved from Dr. Andrea Arellano. A: At 1131 am, Dr. Andrea Arellano was paged via  and contacted unit by phone at [phone] am. Dr. Kailash Dye was informed about radiology's inability to complete gastric study due to NG tube not being placed and that advancement and verification via abdominal KUB would need to performed prior to patient coming down for study. Dr. Kailash Dye provided verbal order with readback for patient to have advanced of NG Tube and STAT portable abdominal KUB to verify placement. Understanding was verbalized. Prior to advancement of NG tube, patient was notified of necessity of advancing tube and verification so that pending gastric study could be completed. Patient verbalized understanding. At 1232 pm, patient was given IV Ativan 1 mg for anxiety and IV Zofran 4 mg for nausea. At 1249 pm, patient received IV Toradol 30 mg to assist with abdominal pain. Patient's continuous suction of NG tube was stopped and patient's NG tube via left nare was advanced until resistance was met. Line was secured in place with silk tape. Patient tolerated procedure without any difficulty or complications. Additionally, patient's head of bed was advanced to 90 degrees to prevent aspiration.   At 12:58 pm,  Radiology representative Manuelkenneth was informed about NG tube advancement, pending KUB abdominal portable study to be performed to verify placement at bedside and pending gastric study that patient could go down for once NG tube verification was completed. Shamice verbalized understanding and stated that someone would be up shortly to complete abdominal KUB for verification of placement. Patient at bedside was notified and verbalized understanding. R: Will continue with prescribed plan of care as directed.    Nomi Iqbal  7/13/2020  1:05 PM

## 2020-07-13 NOTE — ROUTINE PROCESS
1922 Bedside and Verbal shift change report given to Francis Sandhu RN (oncoming nurse) by YOSEPH Powers RN (offgoing nurse). Report included the following information SBAR, Kardex, Intake/Output, MAR and Recent Results. 0005 Pt C/O 8/10 left head, nose and teeth pain R/T NG tube, PRN administered along with PRN Ativan for anxiety, will follow up on effectiveness. 0255 Pt resting in bed with chest rise and fall noted and RR greater than 10.    0421 Pt C/O nausea, PRN Zofran administered. Lisa notified by aide that pt sneezed and NG tube slide down. NG tube advanced and placement verified. Low to mid continous suction. Dr Mary Ann Salas notified and approved of restartting suction due to pt C/O low mid abd pain. Pt states,\"it is starting to feel like it did when she first arrived. \"     0726 Bedside and Verbal shift change report given to  REYNALDO Emerson RN (oncoming nurse) by Francis Sandhu RN (offgoing nurse). Report included the following information SBAR, Kardex, Intake/Output, MAR and Recent Results.

## 2020-07-13 NOTE — PROGRESS NOTES
Not continues with an NGT. Please encourage ambulation to assist with identifying potential transition of care needs. Anticipate pt will transition home with physician follow up when medically stable. Care Management Interventions  PCP Verified by CM: Yes  Mode of Transport at Discharge:  Other (see comment)(family)  Current Support Network: Lives with Spouse  Confirm Follow Up Transport: Self(or family)

## 2020-07-13 NOTE — PROGRESS NOTES
Bedside and Verbal shift change report given to An Ojeda RN(oncoming nurse) by Mehran DALLAS Rn  (offgoing nurse). Report included the following information SBAR, Kardex and MAR. SHIFT UPDATES:  Patient presented with NG tube via left nares that was advanced twice by nightshift and dayshift staff that was verified to not be in place via abdominal KUB. Patient has pending gastric study to be performed on 07/14/2020, once advancement of NG tube is verified. Towards the end of shift Dr. Marco A Cuevas presented to patient's room and advanced NG tube into proper position and connected to continuous medium suction as verbalized by Charge RN [facility]. Dr. Liam Lowe informed nursing staff to not move or re-adjust tube or remove patient from medium suction. ABNORMAL LAB:     Results for Loni Swain (MRN [id number]) as of 7/13/2020 17:26   Ref. Range 7/11/2020 01:57   WBC Latest Ref Range: 4.6 - 13.2 K/uL 6.0   RBC Latest Ref Range: 4.20 - 5.30 M/uL 4.43   HGB Latest Ref Range: 12.0 - 16.0 g/dL 12.1   HCT Latest Ref Range: 35.0 - 45.0 % 37.4   MCV Latest Ref Range: 74.0 - 97.0 FL 84.4   MCH Latest Ref Range: 24.0 - 34.0 PG 27.3   MCHC Latest Ref Range: 31.0 - 37.0 g/dL 32.4   RDW Latest Ref Range: 11.6 - 14.5 % 13.3   PLATELET Latest Ref Range: 135 - 420 K/uL 238   MPV Latest Ref Range: 9.2 - 11.8 FL 9.6   NEUTROPHILS Latest Ref Range: 40 - 73 % 65   LYMPHOCYTES Latest Ref Range: 21 - 52 % 23   MONOCYTES Latest Ref Range: 3 - 10 % 10   EOSINOPHILS Latest Ref Range: 0 - 5 % 2   BASOPHILS Latest Ref Range: 0 - 2 % 0   DF Latest Units:   AUTOMATED   ABS. NEUTROPHILS Latest Ref Range: 1.8 - 8.0 K/UL 3.8   ABS. LYMPHOCYTES Latest Ref Range: 0.9 - 3.6 K/UL 1.4   ABS. MONOCYTES Latest Ref Range: 0.05 - 1.2 K/UL 0.6   ABS. EOSINOPHILS Latest Ref Range: 0.0 - 0.4 K/UL 0.1   ABS.  BASOPHILS Latest Ref Range: 0.0 - 0.1 K/UL 0.0   Sodium Latest Ref Range: 136 - 145 mmol/L 139   Potassium Latest Ref Range: 3.5 - 5.5 mmol/L 3.5   Chloride Latest Ref Range: 100 - 111 mmol/L 103   CO2 Latest Ref Range: 21 - 32 mmol/L 33 (H)   Anion gap Latest Ref Range: 3.0 - 18 mmol/L 3   Glucose Latest Ref Range: 74 - 99 mg/dL 123 (H)   BUN Latest Ref Range: 7.0 - 18 MG/DL 25 (H)   Creatinine Latest Ref Range: 0.6 - 1.3 MG/DL 0.70   BUN/Creatinine ratio Latest Ref Range: 12 - 20   36 (H)   Calcium Latest Ref Range: 8.5 - 10.1 MG/DL 7.8 (L)   GFR est non-AA Latest Ref Range: >60 ml/min/1.73m2 >60   GFR est AA Latest Ref Range: >60 ml/min/1.73m2 >60       Wounds? None    Central Lines? None      Last BM:  07/12/2020      Pending Labs for AM Draw:   No labs but pending Gastric Study to be performed by Radiology on 07/14/2020, as stated by Dr. Chelsea Patel. Discharge plan:  As of 07/13/2020 case management note and Interdisciplinary round reporting, patient is pending home discharge after NG tube is removed with no needs. Case management has stated that patient should be documented when she ambulates in negron and should be encouraged to do such.      Nomi Iqbal  7/13/2020   7:32 PM

## 2020-07-13 NOTE — PROGRESS NOTES
S: Patient retrieved for STAT abdominal KUB and Gastric Study. B: Radiology staff verbalized to nursing staff this AM that patient's NG Tube was not in place to complete the gastric study and that patient advancement and placement verification via abdominal KUB would need to be completed prior to patient having Gastric Study completed. At 1300 pm, patient's NG tube was advanced via left nares until secured in place with silk tape via bridge of patient's nose. Patient presented with STAT bedside portable abdominal KUB to be performed to verify placement. Radiology representative Tay was notified upon patient coming down for study. A: At 1328 pm, medical transport presented to bedside to bring patient down for STAT abdominal KUB as well as gastric study. At 1330 pm, Radiology department was contacted and Radiology representative Seble Lamar was informed that patient needed to have STAT portable bedside abdominal KUB performed prior to coming down for gastric study so that if nursing staff needed to further advance NG tube within place, it could be immediately indicated and done with bedside portable KUB. Radiology representative Seble Lamar was informed that if patient were to come down stairs to have abdominal KUB performed to verify placement, that floor nursing staff would not be there to advance line into place, which would further postpone/delay gastric study to be performed. Radiology representative Seble Lamar indicated that Radiologist Dr. Corrie Banuelos still wanted to proceed with bringing patient down to radiology department for abdominal KUB to verify NG tube placement as well as gastric study. Understanding was verbalized. Patient and charge nurse were notified about Dr. Kathie Jerome initial order to perform bedside portable abdominal KUB to verify placement upon advancing NG Tube and Radiologist Dr. Shon Benjamin order to bring down patient for Abdominal KUB to verify placement and gastric study.  All parties verbalized understanding. Patient left unit via wheelchair around 1331 pm.   R: Will continue with prescribed plan of care as directed.    Nomi Iqbal  7/13/2020  1:35 PM

## 2020-07-13 NOTE — PROGRESS NOTES
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 Patient denies flatus. Reports abdomen feeling better since NGT unclamped. Pain controlled with toradol. Voiding without difficulty.       Current Facility-Administered Medications   Medication Dose Route Frequency    buprenorphine HCL (SUBUTEX) sublingual tablet 8 mg  8 mg SubLINGual DAILY    sodium chloride (NS) flush 5-40 mL  5-40 mL IntraVENous Q8H    dextrose 5% - 0.45% NaCl with KCl 20 mEq/L infusion  125 mL/hr IntraVENous CONTINUOUS       Vitals:  Visit Vitals  /68 (BP 1 Location: Left arm, BP Patient Position: At rest)   Pulse 88   Temp 98.6 °F (37 °C)   Resp 17   Ht 5' 4\" (1.626 m)   Wt 91.6 kg (202 lb)   LMP 2020   SpO2 100%   BMI 34.67 kg/m²     Temp (24hrs), Av.7 °F (37.1 °C), Min:98.3 °F (36.8 °C), Max:99.2 °F (37.3 °C)      Last 24hr Input/Output:    Intake/Output Summary (Last 24 hours) at 2020 0742  Last data filed at 2020 0557  Gross per 24 hour   Intake 2766.9 ml   Output 600 ml   Net 2166.9 ml          Exam:  General: alert, cooperative; NGT in place    Abdomen: mild discomfort upon palpation; voluntary guarding     Extremities: no calf tenderness      Labs:   Lab Results   Component Value Date/Time    WBC 6.0 2020 01:57 AM    WBC 10.7 07/10/2020 12:45 PM    WBC 10.3 2020 09:58 PM    HGB 12.1 2020 01:57 AM    HGB 14.6 07/10/2020 12:45 PM    HGB 12.1 2020 09:58 PM    HCT 37.4 07/11/2020 01:57 AM    HCT 44.4 07/10/2020 12:45 PM    HCT 37.1 07/06/2020 09:58 PM    PLATELET 811 42/80/3800 01:57 AM    PLATELET 900 02/72/0782 12:45 PM    PLATELET 236 25/08/8750 09:58 PM       No results found for this or any previous visit (from the past 24 hour(s)).

## 2020-07-14 ENCOUNTER — APPOINTMENT (OUTPATIENT)
Dept: GENERAL RADIOLOGY | Age: 45
DRG: 330 | End: 2020-07-14
Attending: SURGERY
Payer: COMMERCIAL

## 2020-07-14 ENCOUNTER — ANESTHESIA EVENT (OUTPATIENT)
Dept: SURGERY | Age: 45
DRG: 330 | End: 2020-07-14
Payer: COMMERCIAL

## 2020-07-14 ENCOUNTER — ANESTHESIA (OUTPATIENT)
Dept: SURGERY | Age: 45
DRG: 330 | End: 2020-07-14
Payer: COMMERCIAL

## 2020-07-14 LAB
ANION GAP SERPL CALC-SCNC: 3 MMOL/L (ref 3–18)
BASOPHILS # BLD: 0 K/UL (ref 0–0.1)
BASOPHILS NFR BLD: 0 % (ref 0–2)
BUN SERPL-MCNC: 13 MG/DL (ref 7–18)
BUN/CREAT SERPL: 19 (ref 12–20)
CALCIUM SERPL-MCNC: 8.5 MG/DL (ref 8.5–10.1)
CHLORIDE SERPL-SCNC: 97 MMOL/L (ref 100–111)
CO2 SERPL-SCNC: 34 MMOL/L (ref 21–32)
CREAT SERPL-MCNC: 0.68 MG/DL (ref 0.6–1.3)
DIFFERENTIAL METHOD BLD: ABNORMAL
EOSINOPHIL # BLD: 0 K/UL (ref 0–0.4)
EOSINOPHIL NFR BLD: 0 % (ref 0–5)
ERYTHROCYTE [DISTWIDTH] IN BLOOD BY AUTOMATED COUNT: 13.1 % (ref 11.6–14.5)
GLUCOSE SERPL-MCNC: 139 MG/DL (ref 74–99)
HCG UR QL: NEGATIVE
HCT VFR BLD AUTO: 39 % (ref 35–45)
HGB BLD-MCNC: 12.6 G/DL (ref 12–16)
LYMPHOCYTES # BLD: 0.6 K/UL (ref 0.9–3.6)
LYMPHOCYTES NFR BLD: 9 % (ref 21–52)
MCH RBC QN AUTO: 27 PG (ref 24–34)
MCHC RBC AUTO-ENTMCNC: 32.3 G/DL (ref 31–37)
MCV RBC AUTO: 83.5 FL (ref 74–97)
MONOCYTES # BLD: 0.5 K/UL (ref 0.05–1.2)
MONOCYTES NFR BLD: 7 % (ref 3–10)
NEUTS SEG # BLD: 5.4 K/UL (ref 1.8–8)
NEUTS SEG NFR BLD: 84 % (ref 40–73)
PLATELET # BLD AUTO: 289 K/UL (ref 135–420)
PMV BLD AUTO: 9.9 FL (ref 9.2–11.8)
POTASSIUM SERPL-SCNC: 3.8 MMOL/L (ref 3.5–5.5)
RBC # BLD AUTO: 4.67 M/UL (ref 4.2–5.3)
SODIUM SERPL-SCNC: 134 MMOL/L (ref 136–145)
WBC # BLD AUTO: 6.5 K/UL (ref 4.6–13.2)

## 2020-07-14 PROCEDURE — 76210000006 HC OR PH I REC 0.5 TO 1 HR: Performed by: SURGERY

## 2020-07-14 PROCEDURE — 0WQF0ZZ REPAIR ABDOMINAL WALL, OPEN APPROACH: ICD-10-PCS | Performed by: SURGERY

## 2020-07-14 PROCEDURE — 36415 COLL VENOUS BLD VENIPUNCTURE: CPT

## 2020-07-14 PROCEDURE — 0DSE0ZZ REPOSITION LARGE INTESTINE, OPEN APPROACH: ICD-10-PCS | Performed by: SURGERY

## 2020-07-14 PROCEDURE — 77030008462 HC STPLR SKN PROX J&J -A: Performed by: SURGERY

## 2020-07-14 PROCEDURE — 74011000250 HC RX REV CODE- 250: Performed by: NURSE ANESTHETIST, CERTIFIED REGISTERED

## 2020-07-14 PROCEDURE — 74011250636 HC RX REV CODE- 250/636: Performed by: NURSE ANESTHETIST, CERTIFIED REGISTERED

## 2020-07-14 PROCEDURE — 74011250636 HC RX REV CODE- 250/636

## 2020-07-14 PROCEDURE — C1765 ADHESION BARRIER: HCPCS | Performed by: SURGERY

## 2020-07-14 PROCEDURE — 65270000029 HC RM PRIVATE

## 2020-07-14 PROCEDURE — 81025 URINE PREGNANCY TEST: CPT

## 2020-07-14 PROCEDURE — 74018 RADEX ABDOMEN 1 VIEW: CPT

## 2020-07-14 PROCEDURE — 74011000250 HC RX REV CODE- 250: Performed by: SURGERY

## 2020-07-14 PROCEDURE — 77030018836 HC SOL IRR NACL ICUM -A: Performed by: SURGERY

## 2020-07-14 PROCEDURE — 74011000255 HC RX REV CODE- 255: Performed by: SURGERY

## 2020-07-14 PROCEDURE — 74011000258 HC RX REV CODE- 258: Performed by: SURGERY

## 2020-07-14 PROCEDURE — 77030040361 HC SLV COMPR DVT MDII -B: Performed by: SURGERY

## 2020-07-14 PROCEDURE — C9290 INJ, BUPIVACAINE LIPOSOME: HCPCS | Performed by: SURGERY

## 2020-07-14 PROCEDURE — 74011250636 HC RX REV CODE- 250/636: Performed by: FAMILY MEDICINE

## 2020-07-14 PROCEDURE — 85025 COMPLETE CBC W/AUTO DIFF WBC: CPT

## 2020-07-14 PROCEDURE — 77030016154: Performed by: SURGERY

## 2020-07-14 PROCEDURE — 74011636637 HC RX REV CODE- 636/637: Performed by: SURGERY

## 2020-07-14 PROCEDURE — 74011250636 HC RX REV CODE- 250/636: Performed by: SPECIALIST

## 2020-07-14 PROCEDURE — 0DNE0ZZ RELEASE LARGE INTESTINE, OPEN APPROACH: ICD-10-PCS | Performed by: SURGERY

## 2020-07-14 PROCEDURE — 76010000131 HC OR TIME 2 TO 2.5 HR: Performed by: SURGERY

## 2020-07-14 PROCEDURE — 74011250636 HC RX REV CODE- 250/636: Performed by: SURGERY

## 2020-07-14 PROCEDURE — 76060000035 HC ANESTHESIA 2 TO 2.5 HR: Performed by: SURGERY

## 2020-07-14 PROCEDURE — 77030002996 HC SUT SLK J&J -A: Performed by: SURGERY

## 2020-07-14 PROCEDURE — 80048 BASIC METABOLIC PNL TOTAL CA: CPT

## 2020-07-14 PROCEDURE — 77030002966 HC SUT PDS J&J -A: Performed by: SURGERY

## 2020-07-14 PROCEDURE — 74250 X-RAY XM SM INT 1CNTRST STD: CPT

## 2020-07-14 PROCEDURE — 77030040830 HC CATH URETH FOL MDII -A: Performed by: SURGERY

## 2020-07-14 PROCEDURE — 77030031139 HC SUT VCRL2 J&J -A: Performed by: SURGERY

## 2020-07-14 RX ORDER — SODIUM CHLORIDE 9 MG/ML
125 INJECTION, SOLUTION INTRAVENOUS CONTINUOUS
Status: DISCONTINUED | OUTPATIENT
Start: 2020-07-14 | End: 2020-07-14 | Stop reason: HOSPADM

## 2020-07-14 RX ORDER — SODIUM CHLORIDE, SODIUM LACTATE, POTASSIUM CHLORIDE, CALCIUM CHLORIDE 600; 310; 30; 20 MG/100ML; MG/100ML; MG/100ML; MG/100ML
125 INJECTION, SOLUTION INTRAVENOUS CONTINUOUS
Status: DISCONTINUED | OUTPATIENT
Start: 2020-07-14 | End: 2020-07-14

## 2020-07-14 RX ORDER — FENTANYL CITRATE-0.9 % NACL/PF 900MCG/30
PATIENT CONTROLLED ANALGESIA VIAL INJECTION
Status: DISCONTINUED | OUTPATIENT
Start: 2020-07-14 | End: 2020-07-17

## 2020-07-14 RX ORDER — ONDANSETRON 2 MG/ML
INJECTION INTRAMUSCULAR; INTRAVENOUS AS NEEDED
Status: DISCONTINUED | OUTPATIENT
Start: 2020-07-14 | End: 2020-07-14 | Stop reason: HOSPADM

## 2020-07-14 RX ORDER — FENTANYL CITRATE 50 UG/ML
25 INJECTION, SOLUTION INTRAMUSCULAR; INTRAVENOUS
Status: DISCONTINUED | OUTPATIENT
Start: 2020-07-14 | End: 2020-07-15

## 2020-07-14 RX ORDER — MIDAZOLAM HYDROCHLORIDE 1 MG/ML
INJECTION, SOLUTION INTRAMUSCULAR; INTRAVENOUS AS NEEDED
Status: DISCONTINUED | OUTPATIENT
Start: 2020-07-14 | End: 2020-07-14 | Stop reason: HOSPADM

## 2020-07-14 RX ORDER — ROCURONIUM BROMIDE 10 MG/ML
INJECTION, SOLUTION INTRAVENOUS AS NEEDED
Status: DISCONTINUED | OUTPATIENT
Start: 2020-07-14 | End: 2020-07-14 | Stop reason: HOSPADM

## 2020-07-14 RX ORDER — SODIUM CHLORIDE 0.9 % (FLUSH) 0.9 %
5-40 SYRINGE (ML) INJECTION AS NEEDED
Status: DISCONTINUED | OUTPATIENT
Start: 2020-07-14 | End: 2020-07-17 | Stop reason: SDUPTHER

## 2020-07-14 RX ORDER — KETAMINE HCL IN 0.9 % NACL 50 MG/5 ML
SYRINGE (ML) INTRAVENOUS AS NEEDED
Status: DISCONTINUED | OUTPATIENT
Start: 2020-07-14 | End: 2020-07-14 | Stop reason: HOSPADM

## 2020-07-14 RX ORDER — LIDOCAINE HYDROCHLORIDE 20 MG/ML
INJECTION, SOLUTION EPIDURAL; INFILTRATION; INTRACAUDAL; PERINEURAL AS NEEDED
Status: DISCONTINUED | OUTPATIENT
Start: 2020-07-14 | End: 2020-07-14 | Stop reason: HOSPADM

## 2020-07-14 RX ORDER — NALOXONE HYDROCHLORIDE 0.4 MG/ML
0.1 INJECTION, SOLUTION INTRAMUSCULAR; INTRAVENOUS; SUBCUTANEOUS AS NEEDED
Status: DISCONTINUED | OUTPATIENT
Start: 2020-07-14 | End: 2020-07-14 | Stop reason: HOSPADM

## 2020-07-14 RX ORDER — SODIUM CHLORIDE 0.9 % (FLUSH) 0.9 %
5-40 SYRINGE (ML) INJECTION EVERY 8 HOURS
Status: DISCONTINUED | OUTPATIENT
Start: 2020-07-15 | End: 2020-07-24 | Stop reason: HOSPADM

## 2020-07-14 RX ORDER — MAGNESIUM SULFATE 100 %
4 CRYSTALS MISCELLANEOUS AS NEEDED
Status: DISCONTINUED | OUTPATIENT
Start: 2020-07-14 | End: 2020-07-14 | Stop reason: HOSPADM

## 2020-07-14 RX ORDER — DEXAMETHASONE SODIUM PHOSPHATE 4 MG/ML
INJECTION, SOLUTION INTRA-ARTICULAR; INTRALESIONAL; INTRAMUSCULAR; INTRAVENOUS; SOFT TISSUE AS NEEDED
Status: DISCONTINUED | OUTPATIENT
Start: 2020-07-14 | End: 2020-07-14 | Stop reason: HOSPADM

## 2020-07-14 RX ORDER — SUCCINYLCHOLINE CHLORIDE 100 MG/5ML
SYRINGE (ML) INTRAVENOUS AS NEEDED
Status: DISCONTINUED | OUTPATIENT
Start: 2020-07-14 | End: 2020-07-14 | Stop reason: HOSPADM

## 2020-07-14 RX ORDER — DEXTROSE MONOHYDRATE 100 MG/ML
125-250 INJECTION, SOLUTION INTRAVENOUS AS NEEDED
Status: DISCONTINUED | OUTPATIENT
Start: 2020-07-14 | End: 2020-07-14 | Stop reason: HOSPADM

## 2020-07-14 RX ORDER — FENTANYL CITRATE 50 UG/ML
INJECTION, SOLUTION INTRAMUSCULAR; INTRAVENOUS
Status: COMPLETED
Start: 2020-07-14 | End: 2020-07-14

## 2020-07-14 RX ORDER — BISACODYL 5 MG
10 TABLET, DELAYED RELEASE (ENTERIC COATED) ORAL DAILY
Status: DISCONTINUED | OUTPATIENT
Start: 2020-07-15 | End: 2020-07-24 | Stop reason: HOSPADM

## 2020-07-14 RX ORDER — PROPOFOL 10 MG/ML
INJECTION, EMULSION INTRAVENOUS AS NEEDED
Status: DISCONTINUED | OUTPATIENT
Start: 2020-07-14 | End: 2020-07-14 | Stop reason: HOSPADM

## 2020-07-14 RX ORDER — ONDANSETRON 2 MG/ML
4 INJECTION INTRAMUSCULAR; INTRAVENOUS ONCE
Status: DISCONTINUED | OUTPATIENT
Start: 2020-07-14 | End: 2020-07-14 | Stop reason: HOSPADM

## 2020-07-14 RX ORDER — DEXMEDETOMIDINE HYDROCHLORIDE 100 UG/ML
INJECTION, SOLUTION INTRAVENOUS AS NEEDED
Status: DISCONTINUED | OUTPATIENT
Start: 2020-07-14 | End: 2020-07-14 | Stop reason: HOSPADM

## 2020-07-14 RX ADMIN — ROCURONIUM BROMIDE 20 MG: 10 INJECTION, SOLUTION INTRAVENOUS at 20:49

## 2020-07-14 RX ADMIN — LORAZEPAM 1 MG: 2 INJECTION INTRAMUSCULAR at 01:26

## 2020-07-14 RX ADMIN — Medication 140 MG: at 19:33

## 2020-07-14 RX ADMIN — MIDAZOLAM 2 MG: 1 INJECTION INTRAMUSCULAR; INTRAVENOUS at 19:24

## 2020-07-14 RX ADMIN — ONDANSETRON HYDROCHLORIDE 4 MG: 2 INJECTION INTRAMUSCULAR; INTRAVENOUS at 06:41

## 2020-07-14 RX ADMIN — BUPRENORPHINE HCL 8 MG: 8 TABLET SUBLINGUAL at 16:28

## 2020-07-14 RX ADMIN — DEXTROSE MONOHYDRATE, SODIUM CHLORIDE, AND POTASSIUM CHLORIDE 125 ML/HR: 50; 4.5; 1.49 INJECTION, SOLUTION INTRAVENOUS at 15:20

## 2020-07-14 RX ADMIN — KETOROLAC TROMETHAMINE 30 MG: 30 INJECTION, SOLUTION INTRAMUSCULAR at 07:10

## 2020-07-14 RX ADMIN — HYDROMORPHONE HYDROCHLORIDE 1 MG: 1 INJECTION, SOLUTION INTRAMUSCULAR; INTRAVENOUS; SUBCUTANEOUS at 01:27

## 2020-07-14 RX ADMIN — Medication 20 MG: at 20:08

## 2020-07-14 RX ADMIN — DEXMEDETOMIDINE HYDROCHLORIDE 8 MCG: 100 INJECTION, SOLUTION INTRAVENOUS at 20:17

## 2020-07-14 RX ADMIN — SODIUM CHLORIDE, SODIUM LACTATE, POTASSIUM CHLORIDE, AND CALCIUM CHLORIDE: 600; 310; 30; 20 INJECTION, SOLUTION INTRAVENOUS at 19:24

## 2020-07-14 RX ADMIN — SODIUM CHLORIDE, SODIUM LACTATE, POTASSIUM CHLORIDE, AND CALCIUM CHLORIDE: 600; 310; 30; 20 INJECTION, SOLUTION INTRAVENOUS at 21:38

## 2020-07-14 RX ADMIN — DEXMEDETOMIDINE HYDROCHLORIDE 8 MCG: 100 INJECTION, SOLUTION INTRAVENOUS at 20:42

## 2020-07-14 RX ADMIN — LIDOCAINE HYDROCHLORIDE 100 MG: 20 INJECTION, SOLUTION EPIDURAL; INFILTRATION; INTRACAUDAL; PERINEURAL at 19:33

## 2020-07-14 RX ADMIN — Medication 20 MG: at 19:33

## 2020-07-14 RX ADMIN — FENTANYL CITRATE 25 MCG: 50 INJECTION, SOLUTION INTRAMUSCULAR; INTRAVENOUS at 22:16

## 2020-07-14 RX ADMIN — CEFOXITIN SODIUM 2 G: 2 POWDER, FOR SOLUTION INTRAVENOUS at 19:45

## 2020-07-14 RX ADMIN — FENTANYL CITRATE 25 MCG: 50 INJECTION, SOLUTION INTRAMUSCULAR; INTRAVENOUS at 22:26

## 2020-07-14 RX ADMIN — ROCURONIUM BROMIDE 30 MG: 10 INJECTION, SOLUTION INTRAVENOUS at 19:59

## 2020-07-14 RX ADMIN — KETOROLAC TROMETHAMINE 30 MG: 30 INJECTION, SOLUTION INTRAMUSCULAR at 01:35

## 2020-07-14 RX ADMIN — SUGAMMADEX 190 MG: 100 INJECTION, SOLUTION INTRAVENOUS at 21:35

## 2020-07-14 RX ADMIN — LORAZEPAM 1 MG: 2 INJECTION INTRAMUSCULAR at 15:20

## 2020-07-14 RX ADMIN — ONDANSETRON HYDROCHLORIDE 4 MG: 2 INJECTION INTRAMUSCULAR; INTRAVENOUS at 21:16

## 2020-07-14 RX ADMIN — LORAZEPAM 1 MG: 2 INJECTION INTRAMUSCULAR at 07:10

## 2020-07-14 RX ADMIN — ROCURONIUM BROMIDE 50 MG: 10 INJECTION, SOLUTION INTRAVENOUS at 19:40

## 2020-07-14 RX ADMIN — DEXTROSE MONOHYDRATE, SODIUM CHLORIDE, AND POTASSIUM CHLORIDE 125 ML/HR: 50; 4.5; 1.49 INJECTION, SOLUTION INTRAVENOUS at 23:39

## 2020-07-14 RX ADMIN — BARIUM SULFATE 176 G: 960 POWDER, FOR SUSPENSION ORAL at 09:47

## 2020-07-14 RX ADMIN — KETOROLAC TROMETHAMINE 30 MG: 30 INJECTION, SOLUTION INTRAMUSCULAR at 15:21

## 2020-07-14 RX ADMIN — PROPOFOL 200 MG: 10 INJECTION, EMULSION INTRAVENOUS at 19:33

## 2020-07-14 RX ADMIN — Medication 30 MG: at 19:31

## 2020-07-14 RX ADMIN — FENTANYL CITRATE 25 MCG: 50 INJECTION, SOLUTION INTRAMUSCULAR; INTRAVENOUS at 22:11

## 2020-07-14 RX ADMIN — Medication 30 MG: at 20:03

## 2020-07-14 RX ADMIN — Medication 10 ML: at 15:22

## 2020-07-14 RX ADMIN — Medication: at 22:27

## 2020-07-14 RX ADMIN — DEXAMETHASONE SODIUM PHOSPHATE 8 MG: 4 INJECTION, SOLUTION INTRAMUSCULAR; INTRAVENOUS at 20:03

## 2020-07-14 RX ADMIN — SODIUM CHLORIDE, SODIUM LACTATE, POTASSIUM CHLORIDE, AND CALCIUM CHLORIDE: 600; 310; 30; 20 INJECTION, SOLUTION INTRAVENOUS at 19:46

## 2020-07-14 RX ADMIN — Medication: at 22:58

## 2020-07-14 RX ADMIN — FENTANYL CITRATE 25 MCG: 50 INJECTION, SOLUTION INTRAMUSCULAR; INTRAVENOUS at 22:21

## 2020-07-14 NOTE — PROGRESS NOTES
Patient vomited 700 ml of green emesis in basin. As per patient's primary nurse, MINH Cooper, patient had vomited over a liter of green emesis on linen and self. NG tube checked for placement, and found to be in abdomen. Suction canister not working properly. Changed out canister and suction equipment with 50 ml immediately pulled into canister. Will monitor.

## 2020-07-14 NOTE — PROGRESS NOTES
I advanced  NG tube yesterday and it is draining. Abd - unchanged    Plan SB study to confirm given her large fibroids to make sure exp lap needs to be done since she is planning on hysterectomy in near future.

## 2020-07-14 NOTE — PROGRESS NOTES
Reviewed chart. Pt in radiology. Will await studies and continue care with dr Abdirahman Fontenot. Will discuss hysterectomy if pt goes to exp laparotomy but having both lap for sbo and hysterectomy at same time may increase morbidity.  Will discuss with dr Abdirahman Fontenot

## 2020-07-14 NOTE — INTERVAL H&P NOTE
Update History & Physical    The Patient's History and Physical of July 14,    was reviewed with the patient and I examined the patient. There was see chart. The surgical site was confirmed by the patient and me. Plan:  The risk, benefits, expected outcome, and alternative to the recommended procedure have been discussed with the patient. Patient understands and wants to proceed with the procedure.     Electronically signed by Adelita Richardson MD on 7/14/2020 at 7:17 PM

## 2020-07-14 NOTE — ROUTINE PROCESS
Bedside and Verbal shift change report given to [address] [place] [address] (oncoming nurse) by Shanta Boyle RN (offgoing nurse). Report included the following information SBAR, Kardex, Intake/Output and MAR.

## 2020-07-14 NOTE — PROGRESS NOTES
2144-alert and oriented x 4. Lungs clear, but diminished. BS hypoactive x 4 quads. NPO with NG tube in place, draining greenish drainage. IV access to right AC, infusing D 5% 1/2 NS with 20 MEQ KCL at 125 ml/hr. Up to bathroom with one assist.    0258-Patient vomited greenish thin liquid onto bed x 2, nurse in room and handed the patient a basin. She vomited another 700 ml of greenish fluid into basin. Canister and suction equipment changed, and greenish drainage immediately into canister. 0135-Ativan given for anxiety. Toradol given for c/o pain rated 8/10.    0220-Pain decreased to 5/10. Shift summary-ambulates independently. NG tube remains in place draining green liquid drainage. Toradol given for pain with positive effects. Ativan given for anxiety.

## 2020-07-14 NOTE — PROGRESS NOTES
8638- Bedside and Verbal shift change report given to Chao Calzada RN (oncoming nurse) by  Lilian Grover RN (offgoing nurse). Report included the following information SBAR, Kardex, Intake/Output and MAR.     2545- Order plcaed for STAT KUB to verify placement of newly repositioned NG tube. 0- Spoke with , update provided     200- Called report to [employer]. Report included SBAR, kardex and MAR. Provided verbal/bedside report to Ras Bardales. Report included SBAR, Kardex and MAR.

## 2020-07-14 NOTE — ANESTHESIA PREPROCEDURE EVALUATION
Relevant Problems   No relevant active problems       Anesthetic History   No history of anesthetic complications     Pertinent negatives: No PONV       Review of Systems / Medical History  Patient summary reviewed, nursing notes reviewed and pertinent labs reviewed    Pulmonary              Pertinent negatives: No COPD, asthma and smoker     Neuro/Psych         Psychiatric history     Cardiovascular  Within defined limits              Pertinent negatives: No hypertension, past MI and CAD       GI/Hepatic/Renal     GERD        Pertinent negatives: No liver disease and renal disease   Endo/Other  Within defined limits        Pertinent negatives: No diabetes   Other Findings   Comments: etoh neg           Physical Exam    Airway  Mallampati: III  TM Distance: 4 - 6 cm    Mouth opening: Normal     Cardiovascular               Dental  No notable dental hx       Pulmonary                 Abdominal         Other Findings            Anesthetic Plan    ASA: 2  Anesthesia type: general          Induction: Intravenous  Anesthetic plan and risks discussed with: Patient

## 2020-07-15 LAB
ANION GAP SERPL CALC-SCNC: 5 MMOL/L (ref 3–18)
BASOPHILS # BLD: 0 K/UL (ref 0–0.1)
BASOPHILS NFR BLD: 0 % (ref 0–2)
BUN SERPL-MCNC: 13 MG/DL (ref 7–18)
BUN/CREAT SERPL: 21 (ref 12–20)
CALCIUM SERPL-MCNC: 8.2 MG/DL (ref 8.5–10.1)
CHLORIDE SERPL-SCNC: 100 MMOL/L (ref 100–111)
CO2 SERPL-SCNC: 29 MMOL/L (ref 21–32)
CREAT SERPL-MCNC: 0.62 MG/DL (ref 0.6–1.3)
DIFFERENTIAL METHOD BLD: ABNORMAL
EOSINOPHIL # BLD: 0 K/UL (ref 0–0.4)
EOSINOPHIL NFR BLD: 0 % (ref 0–5)
ERYTHROCYTE [DISTWIDTH] IN BLOOD BY AUTOMATED COUNT: 13.3 % (ref 11.6–14.5)
GLUCOSE SERPL-MCNC: 182 MG/DL (ref 74–99)
HCT VFR BLD AUTO: 38.4 % (ref 35–45)
HGB BLD-MCNC: 12.3 G/DL (ref 12–16)
LYMPHOCYTES # BLD: 0.8 K/UL (ref 0.9–3.6)
LYMPHOCYTES NFR BLD: 8 % (ref 21–52)
MCH RBC QN AUTO: 26.6 PG (ref 24–34)
MCHC RBC AUTO-ENTMCNC: 32 G/DL (ref 31–37)
MCV RBC AUTO: 83.1 FL (ref 74–97)
MONOCYTES # BLD: 0.8 K/UL (ref 0.05–1.2)
MONOCYTES NFR BLD: 8 % (ref 3–10)
NEUTS SEG # BLD: 8.3 K/UL (ref 1.8–8)
NEUTS SEG NFR BLD: 84 % (ref 40–73)
PLATELET # BLD AUTO: 338 K/UL (ref 135–420)
PMV BLD AUTO: 9.7 FL (ref 9.2–11.8)
POTASSIUM SERPL-SCNC: 3.7 MMOL/L (ref 3.5–5.5)
RBC # BLD AUTO: 4.62 M/UL (ref 4.2–5.3)
SODIUM SERPL-SCNC: 134 MMOL/L (ref 136–145)
WBC # BLD AUTO: 9.9 K/UL (ref 4.6–13.2)

## 2020-07-15 PROCEDURE — 80048 BASIC METABOLIC PNL TOTAL CA: CPT

## 2020-07-15 PROCEDURE — 74011250637 HC RX REV CODE- 250/637: Performed by: SURGERY

## 2020-07-15 PROCEDURE — 74011250636 HC RX REV CODE- 250/636: Performed by: SURGERY

## 2020-07-15 PROCEDURE — 85025 COMPLETE CBC W/AUTO DIFF WBC: CPT

## 2020-07-15 PROCEDURE — 36415 COLL VENOUS BLD VENIPUNCTURE: CPT

## 2020-07-15 PROCEDURE — 77030027138 HC INCENT SPIROMETER -A

## 2020-07-15 PROCEDURE — 74011250636 HC RX REV CODE- 250/636: Performed by: OBSTETRICS & GYNECOLOGY

## 2020-07-15 PROCEDURE — 65270000029 HC RM PRIVATE

## 2020-07-15 RX ORDER — ENOXAPARIN SODIUM 100 MG/ML
40 INJECTION SUBCUTANEOUS EVERY 24 HOURS
Status: DISCONTINUED | OUTPATIENT
Start: 2020-07-15 | End: 2020-07-24 | Stop reason: HOSPADM

## 2020-07-15 RX ORDER — LORAZEPAM 2 MG/ML
1 INJECTION INTRAMUSCULAR ONCE
Status: COMPLETED | OUTPATIENT
Start: 2020-07-15 | End: 2020-07-15

## 2020-07-15 RX ORDER — LORAZEPAM 2 MG/ML
1 INJECTION INTRAMUSCULAR DAILY PRN
Status: DISCONTINUED | OUTPATIENT
Start: 2020-07-15 | End: 2020-07-23

## 2020-07-15 RX ADMIN — KETOROLAC TROMETHAMINE 30 MG: 30 INJECTION, SOLUTION INTRAMUSCULAR at 02:22

## 2020-07-15 RX ADMIN — LORAZEPAM 1 MG: 2 INJECTION INTRAMUSCULAR; INTRAVENOUS at 19:36

## 2020-07-15 RX ADMIN — KETOROLAC TROMETHAMINE 30 MG: 30 INJECTION, SOLUTION INTRAMUSCULAR at 14:58

## 2020-07-15 RX ADMIN — BISACODYL 10 MG: 5 TABLET, COATED ORAL at 08:36

## 2020-07-15 RX ADMIN — SODIUM CHLORIDE 500 ML: 900 INJECTION, SOLUTION INTRAVENOUS at 10:32

## 2020-07-15 RX ADMIN — KETOROLAC TROMETHAMINE 30 MG: 30 INJECTION, SOLUTION INTRAMUSCULAR at 21:04

## 2020-07-15 RX ADMIN — Medication: at 02:49

## 2020-07-15 RX ADMIN — LORAZEPAM 1 MG: 2 INJECTION INTRAMUSCULAR; INTRAVENOUS at 01:24

## 2020-07-15 RX ADMIN — KETOROLAC TROMETHAMINE 30 MG: 30 INJECTION, SOLUTION INTRAMUSCULAR at 08:36

## 2020-07-15 RX ADMIN — ENOXAPARIN SODIUM 40 MG: 40 INJECTION SUBCUTANEOUS at 17:34

## 2020-07-15 RX ADMIN — Medication: at 10:39

## 2020-07-15 NOTE — ANESTHESIA POSTPROCEDURE EVALUATION
Procedure(s):  EXPLORATORY LAPAROTOMY; LYSIS OF ADHESIONS, INTERNAL HERNIA REPAIR. general    Anesthesia Post Evaluation        Comments: Post-Anesthesia Evaluation and Assessment    Cardiovascular Function/Vital Signs  /69   Pulse 83   Temp 36.7 °C (98.1 °F)   Resp 10   Ht 5' 4\" (1.626 m)   Wt 91.2 kg (201 lb)   SpO2 100%   BMI 34.50 kg/m²     Patient is status post Procedure(s):  EXPLORATORY LAPAROTOMY; LYSIS OF ADHESIONS, INTERNAL HERNIA REPAIR. Nausea/Vomiting: Controlled. Postoperative hydration reviewed and adequate. Pain:  Pain Scale 1: FLACC (07/14/20 2239)  Pain Intensity 1: 0 (07/14/20 2239)   Managed. Neurological Status:   Neuro (WDL): Within Defined Limits (07/14/20 2225)   At baseline. Mental Status and Level of Consciousness: Arousable. Pulmonary Status:   O2 Device: Nasal cannula (07/14/20 2237)   Adequate oxygenation and airway patent. Complications related to anesthesia: None    Post-anesthesia assessment completed. No concerns. Patient has met all discharge requirements. Signed By: Vazquez Head MD    July 14, 2020                     INITIAL Post-op Vital signs:   Vitals Value Taken Time   /69 7/14/2020 10:35 PM   Temp 36.7 °C (98.1 °F) 7/14/2020  9:53 PM   Pulse 83 7/14/2020 10:40 PM   Resp 7 7/14/2020 10:40 PM   SpO2 100 % 7/14/2020 10:40 PM   Vitals shown include unvalidated device data.

## 2020-07-15 NOTE — PROGRESS NOTES
Pt without flatus now  vss urine output low.  Has not been getting iv bolus of fluid to replace ngt output suspect this is why  Discussed with pt pt doesn't want hysterectomy as second surgery at time of ex lap if done for now high grade sbo  Will continue to follow with you

## 2020-07-15 NOTE — BRIEF OP NOTE
Brief Postoperative Note    Patient: Dillan Cade  YOB: 1975  MRN: [id number]    Date of Procedure: 7/14/2020     Pre-Op Diagnosis: small bowel obstruction    Post-Op Diagnosis: Same as preoperative diagnosis. Procedure(s):  EXPLORATORY LAPAROTOMY; LYSIS OF ADHESIONS, INTERNAL HERNIA REPAIR    Surgeon(s):   Rachna Guillory MD    Surgical Assistant: None    Anesthesia: General     Estimated Blood Loss (mL): less than 209     Complications: None    Specimens: * No specimens in log *     Implants: * No implants in log *    Drains:   Nasogastric Tube 07/10/20 (Active)   Site Assessment Clean, dry, & intact 07/14/20 1632   Securement Device Tape 07/13/20 0950   G Port Status Intermittent Suction 07/14/20 1632   Action Taken Other (comment) 07/12/20 2230   Drainage Description Green 07/14/20 1632   Drainage Chamber Level (ml) 900 ml 07/12/20 1156   Output (ml) 1000 ml 07/14/20 1632       Findings: internal hernia    Electronically Signed by Eliana Bone MD on 7/14/2020 at 10:02 PM

## 2020-07-15 NOTE — PERIOP NOTES
TRANSFER - OUT REPORT:    Verbal report given to Herberth Lopez(name) on Pankaj Rosario  being transferred to (unit) for routine post - op       Report consisted of patients Situation, Background, Assessment and   Recommendations(SBAR). Information from the following report(s) SBAR, Kardex, OR Summary, Procedure Summary, Intake/Output and MAR was reviewed with the receiving nurse. Lines:   Peripheral IV 07/10/20 Right Antecubital (Active)   Site Assessment Clean, dry, & intact 07/14/20 2153   Phlebitis Assessment 0 07/14/20 2153   Infiltration Assessment 0 07/14/20 2153   Dressing Status Clean, dry, & intact 07/14/20 2153   Dressing Type Transparent;Tape 07/14/20 2153   Hub Color/Line Status Pink; Infusing 07/14/20 2153   Action Taken Open ports on tubing capped 07/14/20 0801   Alcohol Cap Used Yes 07/14/20 0801        Opportunity for questions and clarification was provided.       Patient transported with:   Registered Nurse  Tech

## 2020-07-15 NOTE — PROGRESS NOTES
Noted pt had an EXPLORATORY LAPAROTOMY; LYSIS OF ADHESIONS, INTERNAL HERNIA REPAIR over night. Please encourage ambulation to assist with identifying potential transition of care needs. Anticipate pt will transition home with physician follow up when medically stable. CM remains available to assist.    Care Management Interventions  PCP Verified by CM: Yes  Mode of Transport at Discharge:  Other (see comment)(family)  Current Support Network: Lives with Spouse  Confirm Follow Up Transport: Self(or family)

## 2020-07-15 NOTE — PROGRESS NOTES
1164 - Bedside shift report received from Emily Romeo RN. Assumed care of patient. Patient noted resting in bed at this time. Call light in reach. 1694 - Assessment completed as per flowsheet. Patient alert and oriented x4. Respirations even and unlabored. Patient noted without CO2 nasal cannula on. Educated on the importance of CO2 nasal cannula and relationship to PCA. Patient assisted with putting back on after eating ice chips. CO2 and respirations WNL. Denies any SOB/chest pain. NG tube in place in left nostril. Milky mint green drainage noted in suction cannister. ABD dressing CDI to midline abdominal incision. Wilkerson intact with small amount of dark robert urine. Reports pain 9/10 to abdomen and back. PCA encouraged. Patient resting in bed with ice pack to abdomen and call light in reach. 1130 - PCA and capnography noted alarming multiple times and patient reminded multiple times that she needs to keep CO2 nasal cannula in nostrils in order for PCA to function and be effective in her pain relief. 18 - Dr. Mariella Lee notified of decreased urinary output. No new orders received. Stated he will be by later to round on patient. 1458 - PRN Toradol administered for 4/10 pain to abdomen. 1736 - PRN ativan administered per patient request for anxiety.

## 2020-07-15 NOTE — PERIOP NOTES
Visit Vitals  /73   Pulse 85   Temp 98.8 °F (37.1 °C)   Resp 14   Ht 5' 4\" (1.626 m)   Wt 91.2 kg (201 lb)   LMP 07/04/2020   SpO2 100%   BMI 34.50 kg/m²   Incision midline abdomen CDI

## 2020-07-15 NOTE — ROUTINE PROCESS
TRANSFER - IN REPORT:    Verbal report received from Quirino Garcia RN(name) on [address]  being received from [employer]) for routine post - op      Report consisted of patients Situation, Background, Assessment and   Recommendations(SBAR). Information from the following report(s) SBAR, Kardex, OR Summary, Procedure Summary, Intake/Output and MAR was reviewed with the receiving nurse. Opportunity for questions and clarification was provided. Assessment completed upon patients arrival to unit and care assumed. Yung NICHOLE called for order for Ativan per pt's request. Pt on PCA Fentanyl, ice pack to abdomen, dressing CDI. Burnette cath draining to clear urine    0430 Ice pack to head and abdomen provided for comfort    0650 Burnette cath with dark robert urine noted. Pt declined CHG wipes at this time stating, \"It hurts too much to move. \"    [facility] MD made aware of pain not relieved by PCA and output of 350 for shift, was informed to leave burnette cath in for now    0750 Bedside and Verbal shift change report given to Santa Young RN (oncoming nurse) by Serafin Park RN   (offgoing nurse). Report included the following information SBAR, Kardex, OR Summary, Intake/Output, MAR, Recent Results and Med Rec Status.

## 2020-07-15 NOTE — PROGRESS NOTES
Comprehensive Nutrition Assessment    Type and Reason for Visit: (P) Initial    Nutrition Recommendations/Plan: Advance diet or consider TPN if diet advancement not medically feasible due to NPO day 4    Nutrition Assessment:  (P) Pt with SBO, todayy is NPO day 4. Recommend diet advancement if medically feasible, otherwise recommend TPN for source of nutrition. Malnutrition Assessment:  Malnutrition Status:       Context:        Findings of the 6 clinical characteristics of malnutrition:   N/A    Estimated Daily Nutrient Needs:  Energy (kcal):  (P) 2049  Protein (g):  (P) 64-77       Fluid (ml/day):  (P) 2049    Nutrition Related Findings:     N/A    Wounds:    (P) None       Current Nutrition Therapies:   DIET NPO With Ice Chips    Anthropometric Measures:  Height:  (P) 5' 4\" (162.6 cm)  Current Body Wt:  (P) 91.2 kg (201 lb 1 oz)   Admission Body Wt:  (P) 201 lb    Usual Body Wt:    N/A    Ideal Body Wt:   :  (P) 167.5 %   Adjusted Body Weight:   ; Weight Adjustment for: (P) No adjustment   Adjusted BMI:       BMI Categories:  (P) Obese class 1 (BMI 30.0-34. 9)       Nutrition Diagnosis:   (P) Inadequate oral intake related to (P) altered GI function as evidenced by (P) NPO or clear liquid status due to medical condition    Nutrition Interventions:   Food and/or Nutrient Delivery: (P) Start oral diet, Start parenteral nutrition  Nutrition Education and Counseling: (P) No recommendations at this time  Coordination of Nutrition Care: (P) No recommendation at this time    Goals:  (P) Advance diet or begin TPN by next review 1-3 days       Nutrition Monitoring and Evaluation:   Behavioral-Environmental Outcomes:    Food/Nutrient Intake Outcomes: (P) Diet advancement/tolerance, Parenteral nutrition intake/tolerance, IVF intake  Physical Signs/Symptoms Outcomes: (P) GI status, Weight, Biochemical data    Discharge Planning:    (P) Too soon to determine     Electronically signed by Ann Wild on 7/15/2020 at 1:33 PM    Contact: [phone]

## 2020-07-15 NOTE — PROGRESS NOTES
Problem: Falls - Risk of  Goal: *Absence of Falls  Description: Document Katlyn Saul Fall Risk and appropriate interventions in the flowsheet. Outcome: Progressing Towards Goal  Note: Fall Risk Interventions:  Mobility Interventions: Patient to call before getting OOB         Medication Interventions: Patient to call before getting OOB, Teach patient to arise slowly    Elimination Interventions: Call light in reach, Patient to call for help with toileting needs    History of Falls Interventions: Evaluate medications/consider consulting pharmacy         Problem: Patient Education: Go to Patient Education Activity  Goal: Patient/Family Education  Outcome: Progressing Towards Goal     Problem: Pressure Injury - Risk of  Goal: *Prevention of pressure injury  Description: Document Keith Scale and appropriate interventions in the flowsheet. Outcome: Progressing Towards Goal  Note: Pressure Injury Interventions:             Activity Interventions: Increase time out of bed, Pressure redistribution bed/mattress(bed type)    Mobility Interventions: Pressure redistribution bed/mattress (bed type)    Nutrition Interventions: Document food/fluid/supplement intake                     Problem: Patient Education: Go to Patient Education Activity  Goal: Patient/Family Education  Outcome: Progressing Towards Goal     Problem: Pain  Goal: *Control of Pain  Outcome: Progressing Towards Goal     Problem: Patient Education: Go to Patient Education Activity  Goal: Patient/Family Education  Outcome: Progressing Towards Goal

## 2020-07-15 NOTE — PROGRESS NOTES
Progress Note    Patient: Jose Miguel Pierson MRN: [id number]  CSN: [id number]    YOB: 1975  Age: 38 y.o. Sex: female    DOA: 7/10/2020 LOS:  LOS: 5 days                    Subjective:     Patient states feels OK. Objective:      Visit Vitals  /76 (BP 1 Location: Left arm, BP Patient Position: At rest)   Pulse 96   Temp 97.6 °F (36.4 °C)   Resp 16   Ht 5' 4\" (1.626 m)   Wt 91.2 kg (201 lb)   SpO2 100%   BMI 34.50 kg/m²       Physical Exam:  General appearance: Alert, no distress  Lungs: clear to auscultation bilaterally  Heart: regular rate and rhythm, S1, S2 normal, no murmur, click, rub or gallop  Abdomen: soft, appropriate tenderness, non distended  Extremities: extremities normal, atraumatic, no cyanosis or edema, calfs non-tender  Skin: Skin color, texture, turgor normal. No rashes or lesions  Psych: Alert, oriented x3, appropriate    Intake and Output:  Current Shift:  No intake/output data recorded. Last three shifts:  07/13 1901 - 07/15 0700  In: 3300 [I.V.:3300]  Out: 4350 [Urine:675]    Lab/Data Reviewed: All lab results for the last 24 hours reviewed. Medications Reviewed.     Assessment/Plan     Active Problems:    SBO (small bowel obstruction) ([facility] (7/10/2020)      OOB  Keep NG  Lovenox  IVF/ keep burnette secondary to low urine output

## 2020-07-16 LAB
ANION GAP SERPL CALC-SCNC: 4 MMOL/L (ref 3–18)
BUN SERPL-MCNC: 15 MG/DL (ref 7–18)
BUN/CREAT SERPL: 22 (ref 12–20)
CALCIUM SERPL-MCNC: 7.8 MG/DL (ref 8.5–10.1)
CHLORIDE SERPL-SCNC: 101 MMOL/L (ref 100–111)
CO2 SERPL-SCNC: 33 MMOL/L (ref 21–32)
CREAT SERPL-MCNC: 0.68 MG/DL (ref 0.6–1.3)
GLUCOSE BLD STRIP.AUTO-MCNC: 141 MG/DL (ref 70–110)
GLUCOSE SERPL-MCNC: 117 MG/DL (ref 74–99)
POTASSIUM SERPL-SCNC: 3.8 MMOL/L (ref 3.5–5.5)
SODIUM SERPL-SCNC: 138 MMOL/L (ref 136–145)

## 2020-07-16 PROCEDURE — 65270000029 HC RM PRIVATE

## 2020-07-16 PROCEDURE — 80048 BASIC METABOLIC PNL TOTAL CA: CPT

## 2020-07-16 PROCEDURE — 74011250637 HC RX REV CODE- 250/637: Performed by: SURGERY

## 2020-07-16 PROCEDURE — 77010033678 HC OXYGEN DAILY

## 2020-07-16 PROCEDURE — 74011250636 HC RX REV CODE- 250/636: Performed by: SURGERY

## 2020-07-16 PROCEDURE — 82962 GLUCOSE BLOOD TEST: CPT

## 2020-07-16 PROCEDURE — 36415 COLL VENOUS BLD VENIPUNCTURE: CPT

## 2020-07-16 RX ORDER — DEXTROSE MONOHYDRATE 100 MG/ML
125-250 INJECTION, SOLUTION INTRAVENOUS AS NEEDED
Status: DISCONTINUED | OUTPATIENT
Start: 2020-07-16 | End: 2020-07-17 | Stop reason: SDUPTHER

## 2020-07-16 RX ORDER — DEXTROSE MONOHYDRATE 100 MG/ML
125-250 INJECTION, SOLUTION INTRAVENOUS AS NEEDED
Status: DISCONTINUED | OUTPATIENT
Start: 2020-07-16 | End: 2020-07-24 | Stop reason: HOSPADM

## 2020-07-16 RX ORDER — INSULIN LISPRO 100 [IU]/ML
INJECTION, SOLUTION INTRAVENOUS; SUBCUTANEOUS EVERY 6 HOURS
Status: DISCONTINUED | OUTPATIENT
Start: 2020-07-16 | End: 2020-07-16

## 2020-07-16 RX ORDER — MAGNESIUM SULFATE 100 %
4 CRYSTALS MISCELLANEOUS AS NEEDED
Status: DISCONTINUED | OUTPATIENT
Start: 2020-07-16 | End: 2020-07-24 | Stop reason: HOSPADM

## 2020-07-16 RX ORDER — MAGNESIUM SULFATE 100 %
16 CRYSTALS MISCELLANEOUS AS NEEDED
Status: DISCONTINUED | OUTPATIENT
Start: 2020-07-16 | End: 2020-07-17 | Stop reason: SDUPTHER

## 2020-07-16 RX ADMIN — Medication 10 ML: at 14:00

## 2020-07-16 RX ADMIN — ENOXAPARIN SODIUM 40 MG: 40 INJECTION SUBCUTANEOUS at 15:10

## 2020-07-16 RX ADMIN — KETOROLAC TROMETHAMINE 30 MG: 30 INJECTION, SOLUTION INTRAMUSCULAR at 03:03

## 2020-07-16 RX ADMIN — DEXTROSE MONOHYDRATE, SODIUM CHLORIDE, AND POTASSIUM CHLORIDE 150 ML/HR: 50; 4.5; 1.49 INJECTION, SOLUTION INTRAVENOUS at 01:57

## 2020-07-16 RX ADMIN — Medication: at 07:31

## 2020-07-16 RX ADMIN — Medication: at 03:28

## 2020-07-16 RX ADMIN — LORAZEPAM 1 MG: 2 INJECTION INTRAMUSCULAR; INTRAVENOUS at 17:18

## 2020-07-16 RX ADMIN — BISACODYL 10 MG: 5 TABLET, COATED ORAL at 09:05

## 2020-07-16 RX ADMIN — Medication: at 19:18

## 2020-07-16 RX ADMIN — KETOROLAC TROMETHAMINE 30 MG: 30 INJECTION, SOLUTION INTRAMUSCULAR at 09:05

## 2020-07-16 RX ADMIN — KETOROLAC TROMETHAMINE 30 MG: 30 INJECTION, SOLUTION INTRAMUSCULAR at 15:10

## 2020-07-16 RX ADMIN — KETOROLAC TROMETHAMINE 30 MG: 30 INJECTION, SOLUTION INTRAMUSCULAR at 21:01

## 2020-07-16 RX ADMIN — SODIUM CHLORIDE 500 ML: 900 INJECTION, SOLUTION INTRAVENOUS at 12:30

## 2020-07-16 RX ADMIN — Medication: at 23:44

## 2020-07-16 NOTE — PROGRESS NOTES
Progress Note    Patient: Jimena Griggs MRN: [id number]  CSN: [id number]    YOB: 1975  Age: 38 y.o. Sex: female    DOA: 7/10/2020 LOS:  LOS: 6 days                    Subjective:     Patient states feels OK, pain controlled, no flatus. Objective:      Visit Vitals  /73 (BP 1 Location: Left arm, BP Patient Position: At rest)   Pulse 87   Temp 98.4 °F (36.9 °C)   Resp 11   Ht 5' 4\" (1.626 m)   Wt 91.2 kg (201 lb)   SpO2 100%   BMI 34.50 kg/m²       Physical Exam:  General appearance: Alert, no distress  Lungs: clear to auscultation bilaterally  Heart: regular rate and rhythm, S1, S2 normal, no murmur, click, rub or gallop  Abdomen: soft, appropriate tenderness, non distended  Extremities: extremities normal, atraumatic, no cyanosis or edema, calfs non-tender  Skin: Skin color, texture, turgor normal. No rashes or lesions  Psych: Alert, oriented x3, appropriate    Intake and Output:  Current Shift:  No intake/output data recorded. Last three shifts:  07/14 1901 - 07/16 0700  In: 4800 [I.V.:4800]  Out: 3375 [Urine:1225]    Lab/Data Reviewed: All lab results for the last 24 hours reviewed. Medications Reviewed. Assessment/Plan     Active Problems:    SBO (small bowel obstruction) ([address] (7/10/2020)      Needs to be OOB and ambulate  Urine output better, will monitor.   Keep NG  Monitor lytes  Weaning PCA

## 2020-07-16 NOTE — ROUTINE PROCESS
Bedside and Verbal shift change report given to SHABANA Singh RN (oncoming nurse) by MISHEL Hawthorne RN (offgoing nurse). Report included the following information SBAR, Kardex, ED Summary, OR Summary, Intake/Output, MAR and Recent Results.

## 2020-07-16 NOTE — PROGRESS NOTES
NUTRITION UPDATE    - pt NG yesterday had 2100ml output yesterday  Pt is day 6 NPO now; brought up TPN at rounds today was told no-will continue to monitor nutritional status     Per ASPEN guidelines early start of nutrition support is recommended for to help w/ recovery and improve overall nutritional status; if unable to adv diet would recc start of [address] [place] [address] Barb, RD  PAGER:  [phone]

## 2020-07-16 NOTE — PROGRESS NOTES
Encouraged use of IS, tolerating ice chips. NGT to moderate intermittent suction with dark green drainage. Wilkerson draining to dark robert urine. PCA Fent ongoing, pain less than compared to last night. Not able to tolerate standing up, only able to sit up on side of bed    0300 Provided with cold pack for forehead and ice pack for abdomen. CHG wipes done and gown and linens changed. Wilkerson cath in place for accurate I&O    0730 Bedside and Verbal shift change report given to Everett Warren RN (oncoming nurse) by Freida Valles RN   (offgoing nurse). Report included the following information SBAR, Kardex, Intake/Output, MAR and Recent Results.

## 2020-07-16 NOTE — PROGRESS NOTES
3076- Bedside and Verbal shift change report given to Shereen Cross RN (oncoming nurse) by Quita Chang RN (offgoing nurse). Report included the following information SBAR, Kardex and MAR.     0730- PCA verified with Quita Chang RN.    4541- PCA rate change verified with Rufino Chang RN. [employer]- Discussed concerns regarding patient's nutritional status with Melisa Caldwell, as well as Pharmacist, CNS and . Patient is currently on day 4 of NPO status. Addressed options of patient being placed on TPN to prevent malnutrition. 1133- Page placed for Dr. Jose Elena to discuss nutritional concerns of patient's NPO status and possible TPN administration. Awaiting call back. [address] with [address]. Jose Elena regarding patient's nutritional status and possible TPN administration as patient has been NPO for 4 days. Provider stated Desirae Duran her the way she is,\" and that he does not want any additional nutritional support at this time. 12 Caprice Snyder informed of provider's desires to not have patient receive nutritional support at this time. 1145- Patient encouraged and educated on importance of ambulation. Patient declined at this time. 1200- Patient has had minimal UOP, despite burnette catheter. Patient bladder scanned. Obtained amount of 125mL. 18- Dr Aman Corbett paged to inform of patient's low UOP. Awaiting call back. 26- Informed Dr. Jose Elena of patient's low UOP as well as patient's refusal to ambulate. Received orders for 500 cc bolus. 1236- Bolus administered    1715- Patient requested Ativan due to restlessness and anxiety. 1725- Ativan administered. 1919- Verified PCA with Trevon Payne RN. Report included SBAR, Kardex, I/O and MAR.

## 2020-07-16 NOTE — WOUND CARE
Chart audited for low gisela score, patient with high risk for skin breakdown.  Does not wish to be assessed r/t pain      Skin Care & Pressure Relief Recommendations  Minimize layers of linen  Pads under patient to optimize support surface  Turn/reposition approximately every 2 hours  Pillow wedges  Manage incontinence   Promote continence; Skin Protective lotion/cream to buttocks and sacrum daily and as needed with incontinence care  Offload heels pillows

## 2020-07-17 LAB
ANION GAP SERPL CALC-SCNC: 5 MMOL/L (ref 3–18)
BUN SERPL-MCNC: 12 MG/DL (ref 7–18)
BUN/CREAT SERPL: 27 (ref 12–20)
CALCIUM SERPL-MCNC: 7.6 MG/DL (ref 8.5–10.1)
CHLORIDE SERPL-SCNC: 103 MMOL/L (ref 100–111)
CO2 SERPL-SCNC: 31 MMOL/L (ref 21–32)
CREAT SERPL-MCNC: 0.45 MG/DL (ref 0.6–1.3)
GLUCOSE BLD STRIP.AUTO-MCNC: 131 MG/DL (ref 70–110)
GLUCOSE BLD STRIP.AUTO-MCNC: 132 MG/DL (ref 70–110)
GLUCOSE BLD STRIP.AUTO-MCNC: 135 MG/DL (ref 70–110)
GLUCOSE SERPL-MCNC: 136 MG/DL (ref 74–99)
POTASSIUM SERPL-SCNC: 3.8 MMOL/L (ref 3.5–5.5)
SODIUM SERPL-SCNC: 139 MMOL/L (ref 136–145)

## 2020-07-17 PROCEDURE — 65270000029 HC RM PRIVATE

## 2020-07-17 PROCEDURE — 82962 GLUCOSE BLOOD TEST: CPT

## 2020-07-17 PROCEDURE — 74011250637 HC RX REV CODE- 250/637: Performed by: SURGERY

## 2020-07-17 PROCEDURE — 80048 BASIC METABOLIC PNL TOTAL CA: CPT

## 2020-07-17 PROCEDURE — 77010033678 HC OXYGEN DAILY

## 2020-07-17 PROCEDURE — 36415 COLL VENOUS BLD VENIPUNCTURE: CPT

## 2020-07-17 PROCEDURE — 74011250636 HC RX REV CODE- 250/636: Performed by: SURGERY

## 2020-07-17 RX ORDER — HYDROMORPHONE HYDROCHLORIDE 1 MG/ML
1 INJECTION, SOLUTION INTRAMUSCULAR; INTRAVENOUS; SUBCUTANEOUS
Status: DISCONTINUED | OUTPATIENT
Start: 2020-07-17 | End: 2020-07-22

## 2020-07-17 RX ORDER — OXYCODONE AND ACETAMINOPHEN 5; 325 MG/1; MG/1
1 TABLET ORAL
Status: DISCONTINUED | OUTPATIENT
Start: 2020-07-17 | End: 2020-07-19

## 2020-07-17 RX ORDER — KETOROLAC TROMETHAMINE 30 MG/ML
30 INJECTION, SOLUTION INTRAMUSCULAR; INTRAVENOUS
Status: DISCONTINUED | OUTPATIENT
Start: 2020-07-17 | End: 2020-07-19 | Stop reason: SDUPTHER

## 2020-07-17 RX ADMIN — Medication 10 ML: at 21:38

## 2020-07-17 RX ADMIN — ENOXAPARIN SODIUM 40 MG: 40 INJECTION SUBCUTANEOUS at 16:02

## 2020-07-17 RX ADMIN — KETOROLAC TROMETHAMINE 30 MG: 30 INJECTION, SOLUTION INTRAMUSCULAR at 02:55

## 2020-07-17 RX ADMIN — BISACODYL 10 MG: 5 TABLET, COATED ORAL at 09:04

## 2020-07-17 RX ADMIN — DEXTROSE MONOHYDRATE, SODIUM CHLORIDE, AND POTASSIUM CHLORIDE 150 ML/HR: 50; 4.5; 1.49 INJECTION, SOLUTION INTRAVENOUS at 06:58

## 2020-07-17 RX ADMIN — HYDROMORPHONE HYDROCHLORIDE 1 MG: 1 INJECTION, SOLUTION INTRAMUSCULAR; INTRAVENOUS; SUBCUTANEOUS at 13:10

## 2020-07-17 RX ADMIN — LORAZEPAM 1 MG: 2 INJECTION INTRAMUSCULAR; INTRAVENOUS at 06:56

## 2020-07-17 RX ADMIN — DEXTROSE MONOHYDRATE, SODIUM CHLORIDE, AND POTASSIUM CHLORIDE 125 ML/HR: 50; 4.5; 1.49 INJECTION, SOLUTION INTRAVENOUS at 22:05

## 2020-07-17 RX ADMIN — Medication 10 ML: at 13:55

## 2020-07-17 RX ADMIN — KETOROLAC TROMETHAMINE 30 MG: 30 INJECTION, SOLUTION INTRAMUSCULAR at 21:38

## 2020-07-17 RX ADMIN — SODIUM PHOSPHATE, DIBASIC AND SODIUM PHOSPHATE, MONOBASIC 1 ENEMA: 7; 19 ENEMA RECTAL at 09:11

## 2020-07-17 RX ADMIN — DEXTROSE MONOHYDRATE, SODIUM CHLORIDE, AND POTASSIUM CHLORIDE 150 ML/HR: 50; 4.5; 1.49 INJECTION, SOLUTION INTRAVENOUS at 00:12

## 2020-07-17 RX ADMIN — KETOROLAC TROMETHAMINE 30 MG: 30 INJECTION, SOLUTION INTRAMUSCULAR at 16:02

## 2020-07-17 RX ADMIN — Medication 10 ML: at 13:56

## 2020-07-17 RX ADMIN — DEXTROSE MONOHYDRATE, SODIUM CHLORIDE, AND POTASSIUM CHLORIDE 125 ML/HR: 50; 4.5; 1.49 INJECTION, SOLUTION INTRAVENOUS at 13:13

## 2020-07-17 RX ADMIN — KETOROLAC TROMETHAMINE 30 MG: 30 INJECTION, SOLUTION INTRAMUSCULAR at 09:05

## 2020-07-17 NOTE — PROGRESS NOTES
Progress Note    Patient: Divina Hopkins MRN: [id number]  CSN: [id number]    YOB: 1975  Age: 38 y.o. Sex: female    DOA: 7/10/2020 LOS:  LOS: 7 days                    Subjective:     Patient states feels better with less pain. BM this AM  U/O better. Objective:      Visit Vitals  /75 (BP 1 Location: Left arm, BP Patient Position: At rest)   Pulse 99   Temp 98.6 °F (37 °C)   Resp 16   Ht 5' 4\" (1.626 m)   Wt 91.2 kg (201 lb)   SpO2 100%   BMI 34.50 kg/m²       Physical Exam:  General appearance: Alert, no distress  Lungs: clear to auscultation bilaterally  Heart: regular rate and rhythm, S1, S2 normal, no murmur, click, rub or gallop  Abdomen: soft, appropriate tenderness, non distended  Extremities: extremities normal, atraumatic, no cyanosis or edema, calfs non-tender  Skin: Skin color, texture, turgor normal. No rashes or lesions  Psych: Alert, oriented x3, appropriate    Intake and Output:  Current Shift:  No intake/output data recorded. Last three shifts:  07/15 1901 - 07/17 0700  In: 4357.5 [P.O.:480; I.V.:3877.5]  Out: 5850 [Urine:1250]    Lab/Data Reviewed: All lab results for the last 24 hours reviewed. Medications Reviewed.     Assessment/Plan     Active Problems:    SBO (small bowel obstruction) ([facility] [address] (7/10/2020)      Labs OK  Clamp NG and begin sips  Decrease IVF  Ambulate  D/C burnette

## 2020-07-17 NOTE — PROGRESS NOTES
20:25 Assessment completed. Lungs are clear bilat but re decreased in the bases. IS = 500. ABD dsg on abd  Remains C/D/I with hypoactive BS. NGT is patent & is to LIS with green drainage. Remains NPO x for ice chips. Wilkerson remains patent with cloudy, robert colored urine. 23:00 Shift assessment completed. See nsg flow sheet for details. 02:50 Reassessed with 0 changes noted. Abd dsg remains C/D/I with + BS. NGT remains patent with green drainage. Resting quietly in bed with eyes closed between cares between cares . 07:45 Bedside and Verbal shift change report given to Miranda Lyles RN (oncoming nurse) by Matt Lundberg RN (offgoing nurse). Report included the following information SBAR.

## 2020-07-17 NOTE — PROGRESS NOTES
Problem: Falls - Risk of  Goal: *Absence of Falls  Description: Document [place] Fall Risk and appropriate interventions in the flowsheet.   Outcome: Progressing Towards Goal  Note: Fall Risk Interventions:  Mobility Interventions: Communicate number of staff needed for ambulation/transfer, Patient to call before getting OOB         Medication Interventions: Assess postural VS orthostatic hypotension, Patient to call before getting OOB, Teach patient to arise slowly    Elimination Interventions: Call light in reach, Patient to call for help with toileting needs    History of Falls Interventions: Consult care management for discharge planning

## 2020-07-17 NOTE — PROGRESS NOTES
9659  Bedside and verbal shift change report given to Annamaria Ibrahim RN (on coming nurse) by K. Lynnell Nyhan, RN (off going nurse). Report included the following information SBAR, Kardex, OR Summary, Intake/Output and MAR.    0940  Pt had her bowel movement, large brown formed. 1142  NG tube clamped. PCA - cleared since 0717    Shift total 119.2 mcg     Total demands 15     Delivered 8  Fentanyl 14 mg wasted and witnessed by Malcolm Bose RN. Pyxix didn't accept the actual waste amount 14 mL so 1.4 mL number was put in the pyxis. All the lines & Fentanyl 14 mg were wasted into hazardous material container. Wilkerson removed. 46  Notified Dr. Troy Mejia on emesis episode. Pt will keep NG tube clamped even after the emesis episode per Dr. Troy Mejia. Pt will have her toradol back on MAR.    1700  Pt pulled NG tube out by accident. Pt is up and walking hallway. 1932  Bedside and verbal shift change report given by MINH Ureña (off going nurse) to NOBLE Chao RN(on coming nurse). Report included the following information SBAR, Kardex, OR Summary, Intake/Output and MAR.

## 2020-07-17 NOTE — PROGRESS NOTES
Noted pt had an Exploratory laparotomy, Repair, reduction of internal volvulus, internal hernia,and  Lysis of adhesions yesterday. Please encourage ambulation. Anticipate pt will transition home with physician follow up when medically stable. CM to continue to follow and assist as needed. Care Management Interventions  PCP Verified by CM: Yes  Mode of Transport at Discharge:  Other (see comment)(family)  Current Support Network: Lives with Spouse  Confirm Follow Up Transport: Self(or family)

## 2020-07-17 NOTE — OP NOTES
[facility]  OPERATIVE REPORT    Name:  Iban Stuart  MR#:   [id number]  :  1975  ACCOUNT #:  [de-identified]  DATE OF SERVICE:  2020    PREOPERATIVE DIAGNOSIS:  Small bowel obstruction, adhesions. POSTOPERATIVE DIAGNOSES:  1.  Small bowel obstruction, adhesions. 2.  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 PROCEDURE PERFORMED:  1. Exploratory laparotomy. 2.  Repair, reduction of internal volvulus, internal hernia. 3.  Lysis of adhesions, separate procedure. SURGEON:  Katina Castañeda MD    ASSISTANT:  None. ANESTHESIA:  General endotracheal.    COMPLICATIONS:  None. SPECIMENS REMOVED:  None. IMPLANTS:  None. ESTIMATED BLOOD LOSS:  Minimal.    INDICATIONS:  This is a patient who presents with a previous small bowel obstruction with ileocolic anastomosis for perforated appendicitis, presents with small bowel obstruction. She wanted a repair. I have discussed the risks, benefits, alternatives to her including risk of bleeding, infection, recurrence, reoperation, enterotomy, and fistula. She understands and wishes to proceed. PROCEDURE:  She was placed in the supine position. Her abdomen was prepped and draped in the usual fashion. A midline incision was made with a knife, carried down through the subcutaneous tissues with electrocautery. The fascia was opened and the patient was found to have adhesions to the anterior abdominal wall. There was one band of adhesion on the left side of the abdominal wall, which was very tight and appeared to cause a partial obstruction. This was lysed with the Metzenbaum scissors to release the partial obstruction.   Next, the small bowel was run from the ligament of Treitz, and when I went to the distal bowel, near the ileocolic anastomosis on the right side where her previous perforated appendix was, there was evidence that there was a small bowel obstruction caused by an internal hernia at the ileocolic anastomosis. The bowel was viable; however, the adhesions were horrendous and extensive. An extensive lysis of adhesions was done from the ligament of Treitz to the ileocolic anastomosis. A small enterotomy was made in the right colon while doing the lysis of adhesions, and this was repaired using 3-0 and 2-0 silk interrupted sutures. This enterotomy was closed, and there was no leak. Next, the colon was examined, and there were no abnormalities except for constipation. No other abnormalities were seen. Next, I continued to try to free up the adhesions and internal hernia which was very difficult and tedious. Extensive amount of time was done. Once I was able to delineate the anatomy without causing anymore enterotomies, I was able to isolate the internal hernia which was large in the mesentery. The small bowel was then placed back into a near normal anatomical position, and the internal hernia was repaired using 2-0 silk continuous suture. The sutures were locked to prevent any recurrence. Once this was done and I was satisfied that the hernia was repaired, I examined the bowel again, it was viable. The enterotomy was repaired and appeared to be in good condition with no leak. The abdomen was irrigated with warm saline. Exparel was injected locally. The fascia was closed with a 0 stranded, looped PDS continuous suture. Subcutaneous tissues were closed with 3-0 Vicryl suture, and the skin was closed with staples. The patient tolerated the procedure well.       MD ANA [id number]  D:  07/16/2020 17:21  T:  07/17/2020 2:14  JOB #:  [id number]

## 2020-07-18 LAB
GLUCOSE BLD STRIP.AUTO-MCNC: 119 MG/DL (ref 70–110)
GLUCOSE BLD STRIP.AUTO-MCNC: 120 MG/DL (ref 70–110)

## 2020-07-18 PROCEDURE — 74011250636 HC RX REV CODE- 250/636: Performed by: SURGERY

## 2020-07-18 PROCEDURE — 65270000029 HC RM PRIVATE

## 2020-07-18 PROCEDURE — 74011250637 HC RX REV CODE- 250/637: Performed by: SURGERY

## 2020-07-18 PROCEDURE — 82962 GLUCOSE BLOOD TEST: CPT

## 2020-07-18 RX ADMIN — ONDANSETRON HYDROCHLORIDE 4 MG: 2 INJECTION INTRAMUSCULAR; INTRAVENOUS at 06:09

## 2020-07-18 RX ADMIN — KETOROLAC TROMETHAMINE 30 MG: 30 INJECTION, SOLUTION INTRAMUSCULAR at 17:24

## 2020-07-18 RX ADMIN — ONDANSETRON HYDROCHLORIDE 4 MG: 2 INJECTION INTRAMUSCULAR; INTRAVENOUS at 10:15

## 2020-07-18 RX ADMIN — Medication 10 ML: at 14:28

## 2020-07-18 RX ADMIN — ONDANSETRON HYDROCHLORIDE 4 MG: 2 INJECTION INTRAMUSCULAR; INTRAVENOUS at 14:28

## 2020-07-18 RX ADMIN — DEXTROSE MONOHYDRATE, SODIUM CHLORIDE, AND POTASSIUM CHLORIDE 125 ML/HR: 50; 4.5; 1.49 INJECTION, SOLUTION INTRAVENOUS at 06:46

## 2020-07-18 RX ADMIN — HYDROMORPHONE HYDROCHLORIDE 1 MG: 1 INJECTION, SOLUTION INTRAMUSCULAR; INTRAVENOUS; SUBCUTANEOUS at 14:28

## 2020-07-18 RX ADMIN — KETOROLAC TROMETHAMINE 30 MG: 30 INJECTION, SOLUTION INTRAMUSCULAR at 03:42

## 2020-07-18 RX ADMIN — Medication 10 ML: at 00:09

## 2020-07-18 RX ADMIN — BISACODYL 10 MG: 5 TABLET, COATED ORAL at 08:35

## 2020-07-18 RX ADMIN — DEXTROSE MONOHYDRATE, SODIUM CHLORIDE, AND POTASSIUM CHLORIDE 125 ML/HR: 50; 4.5; 1.49 INJECTION, SOLUTION INTRAVENOUS at 13:25

## 2020-07-18 RX ADMIN — ENOXAPARIN SODIUM 40 MG: 40 INJECTION SUBCUTANEOUS at 15:28

## 2020-07-18 RX ADMIN — Medication 10 ML: at 08:37

## 2020-07-18 RX ADMIN — KETOROLAC TROMETHAMINE 30 MG: 30 INJECTION, SOLUTION INTRAMUSCULAR at 11:19

## 2020-07-18 RX ADMIN — HYDROMORPHONE HYDROCHLORIDE 1 MG: 1 INJECTION, SOLUTION INTRAMUSCULAR; INTRAVENOUS; SUBCUTANEOUS at 00:07

## 2020-07-18 RX ADMIN — LORAZEPAM 1 MG: 2 INJECTION INTRAMUSCULAR; INTRAVENOUS at 13:25

## 2020-07-18 RX ADMIN — ONDANSETRON HYDROCHLORIDE 4 MG: 2 INJECTION INTRAMUSCULAR; INTRAVENOUS at 18:51

## 2020-07-18 RX ADMIN — ONDANSETRON HYDROCHLORIDE 4 MG: 2 INJECTION INTRAMUSCULAR; INTRAVENOUS at 00:06

## 2020-07-18 RX ADMIN — HYDROMORPHONE HYDROCHLORIDE 1 MG: 1 INJECTION, SOLUTION INTRAMUSCULAR; INTRAVENOUS; SUBCUTANEOUS at 08:35

## 2020-07-18 NOTE — PROGRESS NOTES
Problem: Falls - Risk of  Goal: *Absence of Falls  Description: Document Rikki Tejada Fall Risk and appropriate interventions in the flowsheet.   Outcome: Progressing Towards Goal  Note: Fall Risk Interventions:  Mobility Interventions: Communicate number of staff needed for ambulation/transfer, Patient to call before getting OOB         Medication Interventions: Assess postural VS orthostatic hypotension, Patient to call before getting OOB, Teach patient to arise slowly    Elimination Interventions: Call light in reach, Patient to call for help with toileting needs    History of Falls Interventions: Consult care management for discharge planning

## 2020-07-18 NOTE — PROGRESS NOTES
Bedside and Verbal shift change report given to Maylene Sever RN  (oncoming nurse) by Rita Caruso. Usman DALLAS, RN (offgoing nurse). Report included the following information SBAR, Kardex and MAR. SHIFT UPDATES:  Patient presents NPO except water and ice chips due to Small bowel obstruction. Patient verbalized that she had normal large bowel movement on 07/17/2020. Patient receives PRN IV Zofran 4 mg every 4 hours for nausea/vomiting, Ativan 1 mg PRN daily for anxiety, IV Toradol 30 mg every 6 hours PRN moderate pain (which patient verbalizes in her abdomen) and  Dilaudid 1 mg IV every 6 hours PRN for moderate-severe pain. All PRN medications have been given and are not due until nightshift. Patient is encouraged to walk in negron to relieve abdominal pain and patient requires encouragement. ABNORMAL LAB:   Results for Baylee Mukherjee (MRN [id number]) as of 7/18/2020 19:43   Ref. Range 7/15/2020 07:54   WBC Latest Ref Range: 4.6 - 13.2 K/uL 9.9   RBC Latest Ref Range: 4.20 - 5.30 M/uL 4.62   HGB Latest Ref Range: 12.0 - 16.0 g/dL 12.3   HCT Latest Ref Range: 35.0 - 45.0 % 38.4   MCV Latest Ref Range: 74.0 - 97.0 FL 83.1   MCH Latest Ref Range: 24.0 - 34.0 PG 26.6   MCHC Latest Ref Range: 31.0 - 37.0 g/dL 32.0   RDW Latest Ref Range: 11.6 - 14.5 % 13.3   PLATELET Latest Ref Range: 135 - 420 K/uL 338   MPV Latest Ref Range: 9.2 - 11.8 FL 9.7   NEUTROPHILS Latest Ref Range: 40 - 73 % 84 (H)   LYMPHOCYTES Latest Ref Range: 21 - 52 % 8 (L)   MONOCYTES Latest Ref Range: 3 - 10 % 8   EOSINOPHILS Latest Ref Range: 0 - 5 % 0   BASOPHILS Latest Ref Range: 0 - 2 % 0   DF Latest Units:   AUTOMATED   ABS. NEUTROPHILS Latest Ref Range: 1.8 - 8.0 K/UL 8.3 (H)   ABS. LYMPHOCYTES Latest Ref Range: 0.9 - 3.6 K/UL 0.8 (L)   ABS. MONOCYTES Latest Ref Range: 0.05 - 1.2 K/UL 0.8   ABS. EOSINOPHILS Latest Ref Range: 0.0 - 0.4 K/UL 0.0   ABS. BASOPHILS Latest Ref Range: 0.0 - 0.1 K/UL 0.0           Wounds?    Abdominal Wound (Dry Dressing). Central Lines? None      Last BM:  07/17/2020 (Large and normal). Pending Labs for AM Draw: None. Discharge plan:  As of 07/17/2020 care management note, patient presents with no care needs upon discharge from facility and will discharge home as directed.      Nomi Iqbal  7/18/2020  7:27 PM

## 2020-07-18 NOTE — ROUTINE PROCESS
1926- Bedside and Verbal shift change report given to NOBLE Cheng RN (oncoming nurse) by Brian Keys RN (offgoing nurse). Report included the following information SBAR, Kardex, Intake/Output, MAR and Recent Results. Introduced self to pt. No complaints at this time. NAD. Call bell within reach. Will continue to monitor. 2137- pt c/o pain 8/10 in abd, requesting prn toradol. Given PRN toradol per MD order. No other complaints at this time. Call bell within reach. Will continue to monitor. 2250- Pt states pain subsiding. No other complaints at this time. NAD. Call bell within reach. Will continue to monitor. 0004- Pt c/o breakthrough pain of 4 in abd and nausea, requests prn dilaudid and zofran. Given PRN meds per MD order. No other complaints ntoed. Call bell within reach. Will continue to monitor. 6978- pt c/o pain 7/10 in abd, requesting prn toradol. Given PRN toradol per MD order. No other complaints at this time. Call bell within reach. Will continue to monitor. 2778- Pt states pain subsiding. Pt c/o nausea requests prn nausea meds. Given prn nausea meds per MD order. No other complaints at this time. NAD. Call bell within reach. Will continue to monitor. 4348- Bedside and Verbal shift change report given to REYNALDO Iqbal RN (oncoming nurse) by NOBLE Cheng RN (offgoing nurse). Report included the following information SBAR, Kardex, Intake/Output, MAR and Recent Results.

## 2020-07-19 ENCOUNTER — APPOINTMENT (OUTPATIENT)
Dept: GENERAL RADIOLOGY | Age: 45
DRG: 330 | End: 2020-07-19
Attending: SURGERY
Payer: COMMERCIAL

## 2020-07-19 LAB
ANION GAP SERPL CALC-SCNC: 7 MMOL/L (ref 3–18)
APPEARANCE UR: CLEAR
BACTERIA URNS QL MICRO: ABNORMAL /HPF
BILIRUB UR QL: NEGATIVE
BUN SERPL-MCNC: 9 MG/DL (ref 7–18)
BUN/CREAT SERPL: 19 (ref 12–20)
CALCIUM SERPL-MCNC: 7.8 MG/DL (ref 8.5–10.1)
CHLORIDE SERPL-SCNC: 101 MMOL/L (ref 100–111)
CO2 SERPL-SCNC: 28 MMOL/L (ref 21–32)
COLOR UR: YELLOW
CREAT SERPL-MCNC: 0.47 MG/DL (ref 0.6–1.3)
EPITH CASTS URNS QL MICRO: ABNORMAL /LPF (ref 0–5)
ERYTHROCYTE [DISTWIDTH] IN BLOOD BY AUTOMATED COUNT: 14 % (ref 11.6–14.5)
ERYTHROCYTE [DISTWIDTH] IN BLOOD BY AUTOMATED COUNT: 14 % (ref 11.6–14.5)
GLUCOSE BLD STRIP.AUTO-MCNC: 102 MG/DL (ref 70–110)
GLUCOSE BLD STRIP.AUTO-MCNC: 144 MG/DL (ref 70–110)
GLUCOSE SERPL-MCNC: 104 MG/DL (ref 74–99)
GLUCOSE UR STRIP.AUTO-MCNC: NEGATIVE MG/DL
HCT VFR BLD AUTO: 31.4 % (ref 35–45)
HCT VFR BLD AUTO: 32.4 % (ref 35–45)
HGB BLD-MCNC: 10.1 G/DL (ref 12–16)
HGB BLD-MCNC: 10.2 G/DL (ref 12–16)
HGB UR QL STRIP: ABNORMAL
KETONES UR QL STRIP.AUTO: NEGATIVE MG/DL
LACTATE SERPL-SCNC: 0.9 MMOL/L (ref 0.4–2)
LEUKOCYTE ESTERASE UR QL STRIP.AUTO: NEGATIVE
MAGNESIUM SERPL-MCNC: 1.8 MG/DL (ref 1.6–2.6)
MCH RBC QN AUTO: 26.4 PG (ref 24–34)
MCH RBC QN AUTO: 26.7 PG (ref 24–34)
MCHC RBC AUTO-ENTMCNC: 31.5 G/DL (ref 31–37)
MCHC RBC AUTO-ENTMCNC: 32.2 G/DL (ref 31–37)
MCV RBC AUTO: 83.1 FL (ref 74–97)
MCV RBC AUTO: 83.9 FL (ref 74–97)
NITRITE UR QL STRIP.AUTO: NEGATIVE
PH UR STRIP: 7 [PH] (ref 5–8)
PHOSPHATE SERPL-MCNC: 3.4 MG/DL (ref 2.5–4.9)
PLATELET # BLD AUTO: 330 K/UL (ref 135–420)
PLATELET # BLD AUTO: 346 K/UL (ref 135–420)
PMV BLD AUTO: 8.8 FL (ref 9.2–11.8)
PMV BLD AUTO: 8.9 FL (ref 9.2–11.8)
POTASSIUM SERPL-SCNC: 3.9 MMOL/L (ref 3.5–5.5)
PROT UR STRIP-MCNC: NEGATIVE MG/DL
RBC # BLD AUTO: 3.78 M/UL (ref 4.2–5.3)
RBC # BLD AUTO: 3.86 M/UL (ref 4.2–5.3)
RBC #/AREA URNS HPF: ABNORMAL /HPF (ref 0–5)
SODIUM SERPL-SCNC: 136 MMOL/L (ref 136–145)
SP GR UR REFRACTOMETRY: 1.01 (ref 1–1.03)
UROBILINOGEN UR QL STRIP.AUTO: 1 EU/DL (ref 0.2–1)
WBC # BLD AUTO: 12.6 K/UL (ref 4.6–13.2)
WBC # BLD AUTO: 17 K/UL (ref 4.6–13.2)
WBC URNS QL MICRO: ABNORMAL /HPF (ref 0–5)

## 2020-07-19 PROCEDURE — 65270000029 HC RM PRIVATE

## 2020-07-19 PROCEDURE — 80048 BASIC METABOLIC PNL TOTAL CA: CPT

## 2020-07-19 PROCEDURE — 71045 X-RAY EXAM CHEST 1 VIEW: CPT

## 2020-07-19 PROCEDURE — 74011250636 HC RX REV CODE- 250/636: Performed by: SURGERY

## 2020-07-19 PROCEDURE — 85027 COMPLETE CBC AUTOMATED: CPT

## 2020-07-19 PROCEDURE — 36415 COLL VENOUS BLD VENIPUNCTURE: CPT

## 2020-07-19 PROCEDURE — 87040 BLOOD CULTURE FOR BACTERIA: CPT

## 2020-07-19 PROCEDURE — 82962 GLUCOSE BLOOD TEST: CPT

## 2020-07-19 PROCEDURE — 74011250637 HC RX REV CODE- 250/637: Performed by: SURGERY

## 2020-07-19 PROCEDURE — 83735 ASSAY OF MAGNESIUM: CPT

## 2020-07-19 PROCEDURE — 84100 ASSAY OF PHOSPHORUS: CPT

## 2020-07-19 PROCEDURE — 83605 ASSAY OF LACTIC ACID: CPT

## 2020-07-19 PROCEDURE — 87086 URINE CULTURE/COLONY COUNT: CPT

## 2020-07-19 PROCEDURE — 97161 PT EVAL LOW COMPLEX 20 MIN: CPT

## 2020-07-19 PROCEDURE — 81001 URINALYSIS AUTO W/SCOPE: CPT

## 2020-07-19 RX ORDER — KETOROLAC TROMETHAMINE 30 MG/ML
30 INJECTION, SOLUTION INTRAMUSCULAR; INTRAVENOUS EVERY 6 HOURS
Status: COMPLETED | OUTPATIENT
Start: 2020-07-20 | End: 2020-07-22

## 2020-07-19 RX ORDER — LORAZEPAM 1 MG/1
1 TABLET ORAL 2 TIMES DAILY
Status: DISCONTINUED | OUTPATIENT
Start: 2020-07-19 | End: 2020-07-24 | Stop reason: HOSPADM

## 2020-07-19 RX ORDER — ONDANSETRON 4 MG/1
4 TABLET, FILM COATED ORAL
Status: DISCONTINUED | OUTPATIENT
Start: 2020-07-19 | End: 2020-07-24 | Stop reason: HOSPADM

## 2020-07-19 RX ORDER — OXYCODONE AND ACETAMINOPHEN 5; 325 MG/1; MG/1
1-2 TABLET ORAL
Status: DISCONTINUED | OUTPATIENT
Start: 2020-07-19 | End: 2020-07-24 | Stop reason: HOSPADM

## 2020-07-19 RX ORDER — KETOROLAC TROMETHAMINE 30 MG/ML
30 INJECTION, SOLUTION INTRAMUSCULAR; INTRAVENOUS
Status: DISCONTINUED | OUTPATIENT
Start: 2020-07-19 | End: 2020-07-19

## 2020-07-19 RX ADMIN — ENOXAPARIN SODIUM 40 MG: 40 INJECTION SUBCUTANEOUS at 15:38

## 2020-07-19 RX ADMIN — LORAZEPAM 1 MG: 1 TABLET ORAL at 09:11

## 2020-07-19 RX ADMIN — ONDANSETRON HYDROCHLORIDE 4 MG: 2 INJECTION INTRAMUSCULAR; INTRAVENOUS at 13:16

## 2020-07-19 RX ADMIN — KETOROLAC TROMETHAMINE 30 MG: 30 INJECTION, SOLUTION INTRAMUSCULAR at 09:10

## 2020-07-19 RX ADMIN — Medication 10 ML: at 13:17

## 2020-07-19 RX ADMIN — BISACODYL 10 MG: 5 TABLET, COATED ORAL at 09:11

## 2020-07-19 RX ADMIN — ONDANSETRON HYDROCHLORIDE 4 MG: 2 INJECTION INTRAMUSCULAR; INTRAVENOUS at 18:15

## 2020-07-19 RX ADMIN — ONDANSETRON HYDROCHLORIDE 4 MG: 2 INJECTION INTRAMUSCULAR; INTRAVENOUS at 09:11

## 2020-07-19 RX ADMIN — KETOROLAC TROMETHAMINE 30 MG: 30 INJECTION, SOLUTION INTRAMUSCULAR at 01:20

## 2020-07-19 RX ADMIN — HYDROMORPHONE HYDROCHLORIDE 1 MG: 1 INJECTION, SOLUTION INTRAMUSCULAR; INTRAVENOUS; SUBCUTANEOUS at 18:15

## 2020-07-19 RX ADMIN — DEXTROSE MONOHYDRATE, SODIUM CHLORIDE, AND POTASSIUM CHLORIDE 75 ML/HR: 50; 4.5; 1.49 INJECTION, SOLUTION INTRAVENOUS at 22:24

## 2020-07-19 RX ADMIN — LORAZEPAM 1 MG: 1 TABLET ORAL at 20:30

## 2020-07-19 RX ADMIN — KETOROLAC TROMETHAMINE 30 MG: 30 INJECTION, SOLUTION INTRAMUSCULAR at 15:38

## 2020-07-19 RX ADMIN — Medication 10 ML: at 21:02

## 2020-07-19 RX ADMIN — ACETAMINOPHEN 650 MG: 325 TABLET ORAL at 15:50

## 2020-07-19 RX ADMIN — DEXTROSE MONOHYDRATE, SODIUM CHLORIDE, AND POTASSIUM CHLORIDE 75 ML/HR: 50; 4.5; 1.49 INJECTION, SOLUTION INTRAVENOUS at 09:09

## 2020-07-19 RX ADMIN — Medication 10 ML: at 05:41

## 2020-07-19 NOTE — PROGRESS NOTES
Bedside and Verbal shift change report given to  Mahsa Landaverde RN (oncoming nurse) by Quirino DALLAS, RN (offgoing nurse). Report included the following information SBAR, Kardex and MAR. SHIFT UPDATES:  Patient had Physical therapy ordered today and ambulated in negron with physical therapy around mid afternoon. Around the latter part of shift, patient presented with a elevated temperature of 102.2 F. Dr. Sharee Mcwilliams ordered for patient to have STAT Urinalysis with reflex, Chest X Ray, CBC with diff, Blood cultures and Lactic acid level to analyze patient for possible sepsis. Additionally, patient received Tylenol 650 mg by mouth at 1550 pm for temperature management. Dr. Moise Jacobo stated to nursing staff that if patient presented with lactic acid >2 to input burnette catheter for sepsis strict intake and output monitoring and to obtain urinalysis sample for urinalysis and if patient presented with lactic acid level less than 2 to obtain urinalysis sample clean catch method. Additionally, Dr. Moise Jacobo presented to unit at [phone] [phone] pm and verbalized to nursing staff that Toradol 30 mg IM every 6 hours PRN would be converted back to Tordadol 30 mg IV every 6 hours. She also verbalized that patient would receive 1-2 tablets of Percocet-5-325 mg every 4 hours PRN for severe pain and fever management. Understanding was verbalized. Patient presented with a reassessed temp of  99.0  F as of 18:11 pm. Patient also had Lactic Acid level of 0.6 and did not qualify for Sepsis. Additionally, patient was started on a clear liquid diet and tolerated without difficulty. Urinalysis sample was collected around 1838 pm an taken down to lab. ABNORMAL LAB:     Results for Darian Farrell (MRN [id number]) as of 7/19/2020 17:09   Ref.  Range 7/19/2020 16:40 7/19/2020 16:48   WBC Latest Ref Range: 4.6 - 13.2 K/uL 17.0 (H)    RBC Latest Ref Range: 4.20 - 5.30 M/uL 3.78 (L)    HGB Latest Ref Range: 12.0 - 16.0 g/dL 10.1 (L)    HCT Latest Ref Range: 35.0 - 45.0 % 31.4 (L)    MCV Latest Ref Range: 74.0 - 97.0 FL 83.1    MCH Latest Ref Range: 24.0 - 34.0 PG 26.7    MCHC Latest Ref Range: 31.0 - 37.0 g/dL 32.2    RDW Latest Ref Range: 11.6 - 14.5 % 14.0    PLATELET Latest Ref Range: 135 - 420 K/uL 346    MPV Latest Ref Range: 9.2 - 11.8 FL 8.9 (L)      Wounds? Abdominal wound (Analyzed by Dr. Kelley Mccarty at 26 [phone] pm. Nursing staff were instructed to place new mepliex dressing. [address] staff will be notified to follow up in accordance to placement). Central Lines? None      Last BM:  07/19/2020    Pending Labs for AM Draw: None. Discharge plan:As of 07/17/2020 care management note, patient presents with no care needs upon discharge from facility and will discharge home as directed.     Nomi Iqbal  7/19/2020   7:26 PM

## 2020-07-19 NOTE — PROGRESS NOTES
Nutrition Note    After 7 days NPO status diet was upgraded to clear liquid on 7/18. Recommend Ensure Clear supplements TID with meal trays to provide additional calories/protein. Continues with D5/0.45%NS/KCl at 75 ml/hr for total 306 kcal/day. Recommend continue to advance diet beyond Clear Liquid per MD discretion to provide substantial source of nutrition.     Electronically signed by Rukhsana Cornejo on 7/19/2020 at 9:51 AM    Contact: [phone]

## 2020-07-19 NOTE — PROGRESS NOTES
1940- Bedside and Verbal shift change report given to [address] (oncoming nurse) by REYNALDO Iqbal (offgoing nurse). Report included the following information SBAR, Kardex, Intake/Output, MAR and Recent Results. 2029- IV D/C'd in RAC due to leaking. New IV start attempted x1, unsuccessful. Medic called to obtain    2130- Medic unsuccessful, encouraged to drink water and medic would re-attempt IV access    2350- Dr. Natasha Moya updated. PO PRN orders given, MD states to continue with PIV access and if unsuccessful EJ may be obtained    0052- Pt states hx of opioid dependence, takes an opioid blocker at home. Does not wish to take PRN percocet, requesting toradol. Orders received from MD.     0120- Medic unsuccessful. Will contact ED for assistance per doctors request.     0200- Spoke with ED staff, ED tech will attempt access once available. 7736- PIV inserted by Lauren Haemed, ED tech. IV fluids resumed    0708- Bedside and Verbal shift change report given to REYNALDO Iqbal (oncoming nurse) by [address] (offgoing nurse). Report included the following information SBAR, Kardex, Intake/Output, MAR and Recent Results.

## 2020-07-19 NOTE — PROGRESS NOTES
07/19/20 1528   Vital Signs   Temp (!) 102.2 °F (39 °C)   Temp Source Oral   Pulse (Heart Rate) 100   Heart Rate Source Monitor   Resp Rate 16   O2 Sat (%) 98 %   Level of Consciousness Alert   /79   MAP (Calculated) 100   BP 1 Method Automatic   BP 1 Location Left arm   BP Patient Position At rest   MEWS Score 3   Pain 1   Pain Scale 1 Numeric (0 - 10)   Pain Intensity 1 0   Patient Stated Pain Goal 0        S: Nursing Supervisor's inquiry about Lactic Acid level/ Lactic Acid level inputted. B: At 1620 pm, Nursing Supervisor Gabi contacted floor and notified Charge Rn Susana Mackey that she saw the  intervention provided for patient's fever (as listed above) as well as labs ordered by Dr. Asif Lees in accordance to MEWS score of 3. Nursing Supervisor Gabi advised nursing staff that Lactic Acid level should be inputted in accordance to Sepsis protocol/management. Understanding was verbalized. A: At 1620 pm, STAT lactic acid level was inputted for patient. R: Will continue with prescribed plan of care as directed.    Nomi Iqbal  7/19/2020  4:22 PM

## 2020-07-19 NOTE — PROGRESS NOTES
Surgery Progress Note    Patient: Liseth Pickard MRN: [id number]  CSN: [id number]    YOB: 1975  Age: 38 y.o. Sex: female    DOA: 7/10/2020 LOS:  LOS: 8 days          Chief Complaint:          Assessment/Plan     Had BM and tolerating clears, advance as tolerated. 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   Last three shifts:  07/17 0701 - 07/18 1900  In: 1978.5 [I.V.:1978.5]  Out: 1700 [Urine:800]    Exam:    General: Well developed, alert, NAD, OX3  Head/Neck: NCAT, supple, No masses, No lymphadenopathy  CVS:Regular rate and rhythm,   Lungs:Clear to auscultation bilaterally, no wheezes,  Abdomen: Soft, appropriately tender, mild distention, Normal Bowel sounds, No hepatomegaly  Extremities: No C/C/E, pulses palpable 2+  Skin:normal texture and turgor, no rashes, no lesions  Neuro:grossly normal , follows commands  Psych:appropriate                Labs: Results:       Chemistry Recent Labs     07/17/20  0045 07/16/20  0255   * 117*    138   K 3.8 3.8   CL 103 101   CO2 31 33*   BUN 12 15   CREA 0.45* 0.68   CA 7.6* 7.8*   AGAP 5 4   BUCR 27* 22*      CBC w/Diff No results for input(s): WBC, RBC, HGB, HCT, PLT, GRANS, LYMPH, EOS, HGBEXT, HCTEXT, PLTEXT in the last 72 hours. Cardiac Enzymes No results for input(s): CPK, CKND1, EDGARDO in the last 72 hours. No lab exists for component: CKRMB, TROIP   Coagulation No results for input(s): PTP, INR, APTT, INREXT in the last 72 hours. Lipid Panel Lab Results   Component Value Date/Time    Cholesterol, total 153 07/13/2016 08:05 AM    HDL Cholesterol 58 07/13/2016 08:05 AM    LDL, calculated 64.6 07/13/2016 08:05 AM    VLDL, calculated 30.4 07/13/2016 08:05 AM    Triglyceride 152 (H) 07/13/2016 08:05 AM    CHOL/HDL Ratio 2.6 07/13/2016 08:05 AM      BNP No results for input(s): BNPP in the last 72 hours. Liver Enzymes No results for input(s): TP, ALB, TBIL, AP in the last 72 hours.     No lab exists for component: SGOT, GPT, DBIL   Thyroid Studies No results found for: T4, T3U, TSH, TSHEXT     Procedures/imaging: see electronic medical records for all procedures/Xrays and details which were not copied into this note but were reviewed prior to creation of Plan      Ezio Salter DO
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
## 2020-07-19 NOTE — PROGRESS NOTES
07/19/20 1528   Vital Signs   Temp (!) 102.2 °F (39 °C)   Temp Source Oral   Pulse (Heart Rate) 100   Heart Rate Source Monitor   Resp Rate 16   O2 Sat (%) 98 %   Level of Consciousness Alert   /79   MAP (Calculated) 100   BP 1 Method Automatic   BP 1 Location Left arm   BP Patient Position At rest   MEWS Score 3   Pain 1   Pain Scale 1 Numeric (0 - 10)   Pain Intensity 1 0   Patient Stated Pain Goal 0       S: Elevated temperature management & MEWs score elevation to 3/ MD notified/ Intervention provided. B: Patient presented awake, alert and responsive with the following vital signs listed above and a MEWS score of 3. Patient's record did not present with any past order for blood cultures. Patient was informed that on call provider Dr. Emmy Bhakta would be paged and inquired about possible blood cultures to be performed and any further possible interventions in accordance to elevated temperature. Patient was informed that she would receive scheduled Tylenol 650 mg to assist with management of fever and would be reassessed 1 hour after administration. Patient verbalized understanding. A: At 1533 pm, Dr. Emmy Bhakta was paged by  and contacted unit at [phone]. She was notified about patient's vital signs as listed above and inquired about possible blood cultures to be drawn since patient's chart did not present with previous ordered/performed blood culture. At 1541 pm, Dr. Emmy Bhakta provided telephone order with readback to Charge MINH Lugo for patient to have CBC with diff, Blood cultures, Urinalysis with reflex and Chest X ray to be performed. Understanding was verbalized. Additionally, patient was informed that tylenol 650 mg was administered at  15:50 pm.  R: will continue with prescribed plan of care as directed.    Nomi Iqbal  7/19/2020   3:51 PM

## 2020-07-19 NOTE — PROGRESS NOTES
Surgery Progress Note    Patient: Brian Nortno MRN: [id number]  CSN: [id number]    YOB: 1975  Age: 38 y.o. Sex: female    DOA: 7/10/2020 LOS:  LOS: 9 days          Chief Complaint:          Assessment/Plan     Fever work up- CXR, CBC, BlCx x2, lactate, if lactate is high will place burnette and get clean UA and resuscitate. If lactate normal then clean catch UA without burnette. Wound examined and CDI without evidence of infection. Pt asking why her pain would be good and suddenly get worse after 3 days - I explained she received exparal which lasts 72h so return of pain may be normal but also said I am completing and exam for the fever to make sure nothing else is wrong. Pt showed understanding. Pain will be controlled with toradol, percocet (short term as pt has addiction problem and was previously on suboxone.)    Had BM and tolerating clears, advance as tolerated.   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    SBO (small bowel obstruction) ([facility] K56.609       Subjective:  \"I feel like I overdid it yesterday\"    Review of systems:    Constitutional: denies fevers, chills, myalgias  Respiratory: denies SOB, cough  Cardiovascular: denies chest pain, palpitations  Gastrointestinal: denies nausea, vomiting, diarrhea      Vital signs/Intake and Output:  Visit Vitals  /79 (BP 1 Location: Left arm, BP Patient Position: At rest)   Pulse 100   Temp (!) 102.2 °F (39 °C)   Resp 16   Ht 5' 4\" (1.626 m)   Wt 91.5 kg (201 lb 11.5 oz)   SpO2 98%   BMI 34.63 kg/m²     Current Shift:  07/19 0701 - 07/19 1900  In: -   Out: 400 [Urine:400]  Last three shifts:  07/17 1901 - 07/19 0700  In: 2764.7 [I.V.:2764.7]  Out: 1300 [Urine:1300]    Exam:    General: Well developed, alert, NAD, OX3  Head/Neck: NCAT, supple, No masses, No lymphadenopathy  CVS:Regular rate and rhythm,   Lungs:Clear to auscultation bilaterally, no wheezes,  Abdomen: Soft, appropriately tender, mild distention, Normal Bowel sounds, No hepatomegaly  Extremities: No C/C/E, pulses palpable 2+  Skin:normal texture and turgor, no rashes, no lesions  Neuro:grossly normal , follows commands  Psych:appropriate                Labs: Results:       Chemistry Recent Labs     07/19/20 0427 07/17/20  0045   * 136*    139   K 3.9 3.8    103   CO2 28 31   BUN 9 12   CREA 0.47* 0.45*   CA 7.8* 7.6*   AGAP 7 5   BUCR 19 27*      CBC w/Diff Recent Labs     07/19/20 0427   WBC 12.6   RBC 3.86*   HGB 10.2*   HCT 32.4*         Cardiac Enzymes No results for input(s): CPK, CKND1, EDGARDO in the last 72 hours. No lab exists for component: CKRMB, TROIP   Coagulation No results for input(s): PTP, INR, APTT, INREXT, INREXT in the last 72 hours. Lipid Panel Lab Results   Component Value Date/Time    Cholesterol, total 153 07/13/2016 08:05 AM    HDL Cholesterol 58 07/13/2016 08:05 AM    LDL, calculated 64.6 07/13/2016 08:05 AM    VLDL, calculated 30.4 07/13/2016 08:05 AM    Triglyceride 152 (H) 07/13/2016 08:05 AM    CHOL/HDL Ratio 2.6 07/13/2016 08:05 AM      BNP No results for input(s): BNPP in the last 72 hours. Liver Enzymes No results for input(s): TP, ALB, TBIL, AP in the last 72 hours.     No lab exists for component: SGOT, GPT, DBIL   Thyroid Studies No results found for: T4, T3U, TSH, TSHEXT, TSHEXT     Procedures/imaging: see electronic medical records for all procedures/Xrays and details which were not copied into this note but were reviewed prior to creation of Plan      Maria L Perla DO

## 2020-07-19 NOTE — PROGRESS NOTES
07/19/20 1528 07/19/20 1811   Vital Signs   Temp (!) 102.2 °F (39 °C) 99 °F (37.2 °C)   Temp Source Oral Oral   Pulse (Heart Rate) 100  --    Heart Rate Source Monitor  --    Resp Rate 16  --    O2 Sat (%) 98 %  --    Level of Consciousness Alert  --    /79  --    MAP (Calculated) 100  --    BP 1 Method Automatic  --    BP 1 Location Left arm  --    BP Patient Position At rest  --    MEWS Score 3  --    Pain 1   Pain Scale 1 Numeric (0 - 10)  --    Pain Intensity 1 0  --    Patient Stated Pain Goal 0  --    Results for Irene Cohen (MRN [id number]) as of 7/19/2020 18:11   Ref. Range 7/19/2020 16:40   Lactic acid Latest Ref Range: 0.4 - 2.0 MMOL/L 0.9       S: Elevated temperature/MEWS score management & reassessment/ No Current signs of Sepsis. B: Around the latter part of shift, patient presented with a elevated temperature of 102.2 F. Dr. Antonio Lin ordered for patient to have STAT Urinalysis with reflex, Chest X Ray, CBC with diff, Blood cultures and Lactic acid level to analyze patient for possible sepsis. Additionally, patient received Tylenol 650 mg by mouth at 1550 pm for temperature management. Dr. Jona Callahan stated to nursing staff that if patient presented with lactic acid >2 to input burnette catheter for sepsis strict intake and output monitoring and to obtain urinalysis sample for urinalysis and if patient presented with lactic acid level less than 2 to obtain urinalysis sample clean catch method. Additionally, Dr. Jona Callahan presented to unit at [phone] and verbalized to nursing staff that Toradol 30 mg IM every 6 hours PRN would be converted back to Tordadol 30 mg IV every 6 hours. She also verbalized that patient would receive 1-2 tablets of Percocet-5-325 mg every 4 hours PRN for severe pain and fever management. Understanding was verbalized. A: At 1812 pm, Patient presented with a reassessed temp of 99.0 F as listed above as well as a stable Lactic Acid level of 0.9.  Patient was notified. Ishaanjadny Gordon was placed in patient's toilet to collect urinalysis sample and patient was provided vaginal wipes prior to utilize prior to urinating in hat to ensure clean catch. Patient verbalized understanding. Nightshift Nursing staff will be notified to follow up in accordance to collecting urinalysis sample. R: Will continue with prescibed plan of are as directed.    Nomi Iqbal  7/19/2020  6:14 PM

## 2020-07-20 ENCOUNTER — APPOINTMENT (OUTPATIENT)
Dept: CT IMAGING | Age: 45
DRG: 330 | End: 2020-07-20
Attending: SURGERY
Payer: COMMERCIAL

## 2020-07-20 PROCEDURE — 74011636320 HC RX REV CODE- 636/320: Performed by: SURGERY

## 2020-07-20 PROCEDURE — 74011250636 HC RX REV CODE- 250/636: Performed by: SURGERY

## 2020-07-20 PROCEDURE — 74011250637 HC RX REV CODE- 250/637: Performed by: SURGERY

## 2020-07-20 PROCEDURE — 74177 CT ABD & PELVIS W/CONTRAST: CPT

## 2020-07-20 PROCEDURE — 74011000258 HC RX REV CODE- 258: Performed by: SURGERY

## 2020-07-20 PROCEDURE — 77030019895 HC PCKNG STRP IODO -A

## 2020-07-20 PROCEDURE — 65270000029 HC RM PRIVATE

## 2020-07-20 RX ADMIN — KETOROLAC TROMETHAMINE 30 MG: 30 INJECTION, SOLUTION INTRAMUSCULAR at 12:05

## 2020-07-20 RX ADMIN — LORAZEPAM 1 MG: 1 TABLET ORAL at 21:25

## 2020-07-20 RX ADMIN — ONDANSETRON HYDROCHLORIDE 4 MG: 2 INJECTION INTRAMUSCULAR; INTRAVENOUS at 00:10

## 2020-07-20 RX ADMIN — KETOROLAC TROMETHAMINE 30 MG: 30 INJECTION, SOLUTION INTRAMUSCULAR at 05:37

## 2020-07-20 RX ADMIN — HYDROMORPHONE HYDROCHLORIDE 1 MG: 1 INJECTION, SOLUTION INTRAMUSCULAR; INTRAVENOUS; SUBCUTANEOUS at 21:25

## 2020-07-20 RX ADMIN — Medication 10 ML: at 21:25

## 2020-07-20 RX ADMIN — DIATRIZOATE MEGLUMINE AND DIATRIZOATE SODIUM 30 ML: 660; 100 LIQUID ORAL; RECTAL at 12:09

## 2020-07-20 RX ADMIN — DEXTROSE MONOHYDRATE, SODIUM CHLORIDE, AND POTASSIUM CHLORIDE 75 ML/HR: 50; 4.5; 1.49 INJECTION, SOLUTION INTRAVENOUS at 20:13

## 2020-07-20 RX ADMIN — PIPERACILLIN AND TAZOBACTAM 3.38 G: 3; .375 INJECTION, POWDER, LYOPHILIZED, FOR SOLUTION INTRAVENOUS at 02:03

## 2020-07-20 RX ADMIN — Medication 10 ML: at 05:37

## 2020-07-20 RX ADMIN — ONDANSETRON HYDROCHLORIDE 4 MG: 2 INJECTION INTRAMUSCULAR; INTRAVENOUS at 17:58

## 2020-07-20 RX ADMIN — ENOXAPARIN SODIUM 40 MG: 40 INJECTION SUBCUTANEOUS at 17:15

## 2020-07-20 RX ADMIN — KETOROLAC TROMETHAMINE 30 MG: 30 INJECTION, SOLUTION INTRAMUSCULAR at 00:10

## 2020-07-20 RX ADMIN — PIPERACILLIN AND TAZOBACTAM 3.38 G: 3; .375 INJECTION, POWDER, LYOPHILIZED, FOR SOLUTION INTRAVENOUS at 20:00

## 2020-07-20 RX ADMIN — HYDROMORPHONE HYDROCHLORIDE 1 MG: 1 INJECTION, SOLUTION INTRAMUSCULAR; INTRAVENOUS; SUBCUTANEOUS at 15:08

## 2020-07-20 RX ADMIN — KETOROLAC TROMETHAMINE 30 MG: 30 INJECTION, SOLUTION INTRAMUSCULAR at 17:59

## 2020-07-20 RX ADMIN — HYDROMORPHONE HYDROCHLORIDE 1 MG: 1 INJECTION, SOLUTION INTRAMUSCULAR; INTRAVENOUS; SUBCUTANEOUS at 02:02

## 2020-07-20 RX ADMIN — PROMETHAZINE HYDROCHLORIDE 12.5 MG: 25 INJECTION, SOLUTION INTRAMUSCULAR; INTRAVENOUS at 21:00

## 2020-07-20 RX ADMIN — LORAZEPAM 1 MG: 1 TABLET ORAL at 08:42

## 2020-07-20 RX ADMIN — BISACODYL 10 MG: 5 TABLET, COATED ORAL at 08:42

## 2020-07-20 RX ADMIN — HYDROMORPHONE HYDROCHLORIDE 1 MG: 1 INJECTION, SOLUTION INTRAMUSCULAR; INTRAVENOUS; SUBCUTANEOUS at 08:42

## 2020-07-20 RX ADMIN — Medication 10 ML: at 15:08

## 2020-07-20 RX ADMIN — IOPAMIDOL 100 ML: 612 INJECTION, SOLUTION INTRAVENOUS at 14:13

## 2020-07-20 RX ADMIN — PIPERACILLIN AND TAZOBACTAM 3.38 G: 3; .375 INJECTION, POWDER, LYOPHILIZED, FOR SOLUTION INTRAVENOUS at 08:42

## 2020-07-20 RX ADMIN — ONDANSETRON HYDROCHLORIDE 4 MG: 2 INJECTION INTRAMUSCULAR; INTRAVENOUS at 12:08

## 2020-07-20 NOTE — PROGRESS NOTES
Called to see pt for dark bloody drainage saturating newly placed dressing. CT from earlier today reviewed. Incision closed w staples, no erythema, but old blood/clot draining between staples just above umbilicus. Removed 3 staples at this site, immediately drained 200+ of dark blood and clot. No pus or foul odor. No active bleeding noted. Gently probed fascial closure w q-tip - no obvious fascial dehiscence. Wound packed with 1/2\" packing, covered w gauze/ABD.       RP

## 2020-07-20 NOTE — PROGRESS NOTES
5372 Received report from Deacon Garza RN offgoing nurse. Report included the following information SBAR, Kardex, Intake/Output, MAR and Recent Results. 5780 Received call from CT to give contrast for CT. Instructions provided for a 30 mL vial to be given in two doses one hour apart. Call CT when giving second dose. 1968  Paged Dr. Troy Mejia as pt refuses to take gastroview for CT. Pt c/o of inability to drink liquids well. Pt requests a \"tube\". Informed dr. Troy Mejia of situation and he encouraged to have her drink it the best she can because the contrast necessary for procedure. [phone] Dr. Troy Mejia gave order fo urine culture telephone with with read back. 1215 Pt completed 16 oz water & 15 mL of Gastroview. Pt c/o of abdominal pain. 1330 Pt completed second half of Gastroview: 15 mL diluted in 16 oz of water. 1410 Pt transported by tech to CT.    1500 Urine Culture captured and sent to lab. Clean catch. 1655 Reapplied SCD's. Provided pt teaching about their use. 1700 Received call from IR that it was OK to give prescribed Lovenox this afternoon as the next dose is 24 hours. 1730 Changed dressing over abdomen. Drainage noted at 12th staple of Sanguinous fluid. Did not soak the carley but was draining enough to require change. 200 Paged Dr. Usman Webster. Pt has saturated an ABD pad in one hour and has sanguinous drainage from wound/incision. 1900 Dr. Usman Webster said he would return to evaluate and do bedside procedure. Preparing supplies. 1915 Provided bedside assist to Dr. Usman Webster who opened up the wound and packed with ibiform and covered with ABD pad.    1930 Provided bedside report to Erik Cho RN oncoming nurse. Report included the following information SBAR, Kardex, Intake/Output, MAR and Recent Results. We also looked at the new bandage over wound. No new drainage seeping in ABD pad.

## 2020-07-20 NOTE — PROGRESS NOTES
Comprehensive Nutrition Assessment    Type and Reason for Visit: Reassess    Nutrition Recommendations/Plan: Other: [employer] scheduling zofran instead of PRN to help prevent nausea to increase PO intake and tolerance of diet     Nutrition Assessment:  pt is now NPO/Clear Liquid for 10 days; she was started on clear liquids over the weekend and dietitian on call added Ensure Clear; pt is eating tray and supplements, but reports nausea; nurse today in rounds reported pt is going for GI test today, but wants a tube vs test; at this point we either need to adv diet or start nutrition support;        Estimated Daily Nutrient Needs:  Energy (kcal):  2049  Protein (g):  64-77       Fluid (ml/day):  2049    Nutrition Related Findings:  PTA pt was exercising and eating better which helped her to lose 20lbs at home      Wounds:    None       Current Nutrition Therapies:   DIET CLEAR LIQUID  DIET NUTRITIONAL SUPPLEMENTS Breakfast, Lunch, Dinner; Ensure Clear    Anthropometric Measures:  Height:  5' 4\" (162.6 cm)  Current Body Wt:  91.2 kg (201 lb)   Admission Body Wt:  201 lb    Usual Body Wt:        Ideal Body Wt:  120:  167.5 %   Adjusted Body Weight:   ; Weight Adjustment for: No adjustment   Adjusted BMI:       BMI Categories:  Obese class 1 (BMI 30.0-34. 9)       Nutrition Diagnosis:   (P) Inadequate oral intake related to (P) altered GI function as evidenced by (P) NPO or clear liquid status due to medical condition    Nutrition Interventions:   Food and/or Nutrient Delivery: (P) Modify current diet, Continue oral nutrition supplement  Nutrition Education and Counseling: (P) No recommendations at this time  Coordination of Nutrition Care: (P) Continued inpatient monitoring, Interdisciplinary rounds    Goals:  (P) Adv diet per MD or start nutrition support       Nutrition Monitoring and Evaluation:   Behavioral-Environmental Outcomes:    Food/Nutrient Intake Outcomes: (P) Diet advancement/tolerance, Food and nutrient intake, Supplement intake  Physical Signs/Symptoms Outcomes: (P) Biochemical data, Nutrition focused physical findings, Skin, Weight, Fluid status or edema, Nausea/vomiting, GI status    Discharge Planning:    (P) Too soon to determine     Electronically signed by Cathy Avilezt on 7/20/2020 at 11:48 AM

## 2020-07-20 NOTE — PROGRESS NOTES
Has abscess. Will ask IR to perc drain. Tried to contact patient in room as I am out of hospital but she is not in room.

## 2020-07-20 NOTE — PROGRESS NOTES
1925- Bedside and Verbal shift change report given to Sam Dickerson   (oncoming nurse) by REYNALDO Iqbal (offgoing nurse). Report included the following information SBAR, Kardex, Intake/Output, MAR and Recent Results. 0105 - Pt stated pain level 9/10 after PRN toradol administered and experienced green emetic episode of 400 mL. Dr. Zamzam Sevilla notified, orders received for additional PRN medications and IV zosyn due to increase in WBC count. Shift summary- pt pain controlled with PRN meds. Pt did not ambulate or use IS due to pain despite encouragement. 6690- Bedside and Verbal shift change report given to ANGELA Matthews (oncoming nurse) by Sam Dickerson (offgoing nurse). Report included the following information SBAR, Kardex, Intake/Output, MAR and Recent Results.

## 2020-07-20 NOTE — PROGRESS NOTES
Pt is not medically stable at this time to transition from an acute care setting. Please encourage ambulation. Anticipate pt will transition home with physician follow up when medically stable. CM to continue to follow and assist as needed. Care Management Interventions  PCP Verified by CM: Yes  Mode of Transport at Discharge:  Other (see comment)(family)  Current Support Network: Lives with Spouse  Confirm Follow Up Transport: Self(or family)

## 2020-07-21 LAB
APTT PPP: 31 SEC (ref 23–36.4)
BASOPHILS # BLD: 0 K/UL (ref 0–0.1)
BASOPHILS NFR BLD: 0 % (ref 0–2)
DIFFERENTIAL METHOD BLD: ABNORMAL
EOSINOPHIL # BLD: 0.1 K/UL (ref 0–0.4)
EOSINOPHIL NFR BLD: 1 % (ref 0–5)
ERYTHROCYTE [DISTWIDTH] IN BLOOD BY AUTOMATED COUNT: 14.4 % (ref 11.6–14.5)
HCT VFR BLD AUTO: 31.1 % (ref 35–45)
HGB BLD-MCNC: 9.9 G/DL (ref 12–16)
INR PPP: 1.1 (ref 0.8–1.2)
LYMPHOCYTES # BLD: 1.3 K/UL (ref 0.9–3.6)
LYMPHOCYTES NFR BLD: 12 % (ref 21–52)
MCH RBC QN AUTO: 26.8 PG (ref 24–34)
MCHC RBC AUTO-ENTMCNC: 31.8 G/DL (ref 31–37)
MCV RBC AUTO: 84.1 FL (ref 74–97)
MONOCYTES # BLD: 0.5 K/UL (ref 0.05–1.2)
MONOCYTES NFR BLD: 4 % (ref 3–10)
NEUTS SEG # BLD: 8.4 K/UL (ref 1.8–8)
NEUTS SEG NFR BLD: 83 % (ref 40–73)
PLATELET # BLD AUTO: 335 K/UL (ref 135–420)
PMV BLD AUTO: 9.1 FL (ref 9.2–11.8)
PROTHROMBIN TIME: 14.2 SEC (ref 11.5–15.2)
RBC # BLD AUTO: 3.7 M/UL (ref 4.2–5.3)
WBC # BLD AUTO: 10.2 K/UL (ref 4.6–13.2)

## 2020-07-21 PROCEDURE — 74011000258 HC RX REV CODE- 258: Performed by: SURGERY

## 2020-07-21 PROCEDURE — 85025 COMPLETE CBC W/AUTO DIFF WBC: CPT

## 2020-07-21 PROCEDURE — 74011250637 HC RX REV CODE- 250/637: Performed by: SURGERY

## 2020-07-21 PROCEDURE — 65270000029 HC RM PRIVATE

## 2020-07-21 PROCEDURE — 36415 COLL VENOUS BLD VENIPUNCTURE: CPT

## 2020-07-21 PROCEDURE — 74011250636 HC RX REV CODE- 250/636: Performed by: SURGERY

## 2020-07-21 PROCEDURE — 85730 THROMBOPLASTIN TIME PARTIAL: CPT

## 2020-07-21 PROCEDURE — 85610 PROTHROMBIN TIME: CPT

## 2020-07-21 RX ADMIN — KETOROLAC TROMETHAMINE 30 MG: 30 INJECTION, SOLUTION INTRAMUSCULAR at 00:19

## 2020-07-21 RX ADMIN — KETOROLAC TROMETHAMINE 30 MG: 30 INJECTION, SOLUTION INTRAMUSCULAR at 11:16

## 2020-07-21 RX ADMIN — PIPERACILLIN AND TAZOBACTAM 3.38 G: 3; .375 INJECTION, POWDER, LYOPHILIZED, FOR SOLUTION INTRAVENOUS at 02:00

## 2020-07-21 RX ADMIN — HYDROMORPHONE HYDROCHLORIDE 1 MG: 1 INJECTION, SOLUTION INTRAMUSCULAR; INTRAVENOUS; SUBCUTANEOUS at 03:12

## 2020-07-21 RX ADMIN — PIPERACILLIN AND TAZOBACTAM 3.38 G: 3; .375 INJECTION, POWDER, LYOPHILIZED, FOR SOLUTION INTRAVENOUS at 09:34

## 2020-07-21 RX ADMIN — LORAZEPAM 1 MG: 1 TABLET ORAL at 09:34

## 2020-07-21 RX ADMIN — HYDROMORPHONE HYDROCHLORIDE 1 MG: 1 INJECTION, SOLUTION INTRAMUSCULAR; INTRAVENOUS; SUBCUTANEOUS at 21:14

## 2020-07-21 RX ADMIN — ENOXAPARIN SODIUM 40 MG: 40 INJECTION SUBCUTANEOUS at 15:43

## 2020-07-21 RX ADMIN — LORAZEPAM 1 MG: 1 TABLET ORAL at 21:14

## 2020-07-21 RX ADMIN — Medication 10 ML: at 06:51

## 2020-07-21 RX ADMIN — HYDROMORPHONE HYDROCHLORIDE 1 MG: 1 INJECTION, SOLUTION INTRAMUSCULAR; INTRAVENOUS; SUBCUTANEOUS at 15:43

## 2020-07-21 RX ADMIN — Medication 10 ML: at 21:15

## 2020-07-21 RX ADMIN — PROMETHAZINE HYDROCHLORIDE 12.5 MG: 25 INJECTION, SOLUTION INTRAMUSCULAR; INTRAVENOUS at 19:06

## 2020-07-21 RX ADMIN — PROMETHAZINE HYDROCHLORIDE 12.5 MG: 25 INJECTION, SOLUTION INTRAMUSCULAR; INTRAVENOUS at 11:16

## 2020-07-21 RX ADMIN — KETOROLAC TROMETHAMINE 30 MG: 30 INJECTION, SOLUTION INTRAMUSCULAR at 18:18

## 2020-07-21 RX ADMIN — KETOROLAC TROMETHAMINE 30 MG: 30 INJECTION, SOLUTION INTRAMUSCULAR at 23:56

## 2020-07-21 RX ADMIN — Medication 10 ML: at 14:00

## 2020-07-21 RX ADMIN — KETOROLAC TROMETHAMINE 30 MG: 30 INJECTION, SOLUTION INTRAMUSCULAR at 06:31

## 2020-07-21 RX ADMIN — BISACODYL 10 MG: 5 TABLET, COATED ORAL at 09:34

## 2020-07-21 RX ADMIN — PIPERACILLIN AND TAZOBACTAM 3.38 G: 3; .375 INJECTION, POWDER, LYOPHILIZED, FOR SOLUTION INTRAVENOUS at 21:14

## 2020-07-21 RX ADMIN — HYDROMORPHONE HYDROCHLORIDE 1 MG: 1 INJECTION, SOLUTION INTRAMUSCULAR; INTRAVENOUS; SUBCUTANEOUS at 09:34

## 2020-07-21 RX ADMIN — PIPERACILLIN AND TAZOBACTAM 3.38 G: 3; .375 INJECTION, POWDER, LYOPHILIZED, FOR SOLUTION INTRAVENOUS at 13:30

## 2020-07-21 RX ADMIN — DEXTROSE MONOHYDRATE, SODIUM CHLORIDE, AND POTASSIUM CHLORIDE 75 ML/HR: 50; 4.5; 1.49 INJECTION, SOLUTION INTRAVENOUS at 09:36

## 2020-07-21 RX ADMIN — PROMETHAZINE HYDROCHLORIDE 12.5 MG: 25 INJECTION, SOLUTION INTRAMUSCULAR; INTRAVENOUS at 02:48

## 2020-07-21 NOTE — ROUTINE PROCESS
1940 Bedside and Verbal shift change report given to Jonas Pollock RN (oncoming nurse) by Soo Whaley. Avelina Valle RN (offgoing nurse). Report included the following information SBAR, Kardex, Intake/Output, MAR and Recent Results. 2100 Pt C/O nausea, PRN  Phernagan administered will follow up on effectiveness. 2125 Pt states nausea has subsided but C/O 8/10 abd pain unrelieved by repositioning, PRN administered will follow up on effectiveness. 2128 Pt states pain is subsiding. 0248 Pt states nausea has returned, PRN Phernagan administered again will follow up on effectiveness. 6224 Pt states nausea has subsided but C/O 8/10 abd pain unrelieved by repositioning, PRN administered will follow up on effectiveness. 0352 Pt states pain has subsided. 0645 Pt family brought home meds up to facility, meds counted w/ pt at bedside w/ Susan RN at bedside. Pt signed and meds placed in security belongings bag and sent to pharmacy to hold till discharge. Receipt placed in charge for verification upon discharge. 0730 Bedside and Verbal shift change report given to Malik Joseph RN (oncoming nurse) by Jonas Pollock RN (offgoing nurse). Report included the following information SBAR, Kardex, Intake/Output, MAR and Recent Results.

## 2020-07-21 NOTE — PROGRESS NOTES
Problem: Falls - Risk of  Goal: *Absence of Falls  Description: Document Huong Foss Fall Risk and appropriate interventions in the flowsheet. Outcome: Progressing Towards Goal  Note: Fall Risk Interventions:  Mobility Interventions: Patient to call before getting OOB, Assess mobility with egress test         Medication Interventions: Patient to call before getting OOB, Teach patient to arise slowly    Elimination Interventions: Call light in reach, Patient to call for help with toileting needs    History of Falls Interventions: Door open when patient unattended         Problem: Pressure Injury - Risk of  Goal: *Prevention of pressure injury  Description: Document Keith Scale and appropriate interventions in the flowsheet.   Outcome: Progressing Towards Goal  Note: Pressure Injury Interventions:  Sensory Interventions: Minimize linen layers    Moisture Interventions: Offer toileting Q_hr    Activity Interventions: Pressure redistribution bed/mattress(bed type), Increase time out of bed    Mobility Interventions: HOB 30 degrees or less, Pressure redistribution bed/mattress (bed type)    Nutrition Interventions: Document food/fluid/supplement intake    Friction and Shear Interventions: Minimize layers

## 2020-07-21 NOTE — PROGRESS NOTES
3198- Received verbal report from Kane Carvajal. Report included SBAR, Kardex and MAR.    1020-Patient requested Phenergan. Contacted pharmacy to have medication brought to unit. 1334- Dressing change performed per nursing orders. 1430- Patient tolerated diet advancement. No reports of vomiting. Patient still complains of intermittent nausea. 1908- Phenergan administered    Provided verbal/bedside report to Bob Martinez. Report included SBAR, Kardex and MAR.

## 2020-07-21 NOTE — PROGRESS NOTES
D/C Plan: [facility] with wound vac    Noted provider has indicated pt will need a wound vac upon discharge. CM met with pt to discuss transition of care options to include [facility] vs wound care clinic follow up. Pt would prefer to return home with [facility] services. CM provided pt with a list of area [facility] agencies to refer to and offered FOC. Pt has selected [facility]. CMS has been notified to assist.  Noted wound care has been consulted to assist with home wound vac. Anticipate pt will transition home once wound vac has been approved and pt is medically stable. CM to continue to follow and assist with transition of care needs. Care Management Interventions  PCP Verified by CM: Yes  Mode of Transport at Discharge:  Other (see comment)(Family)  Transition of Care Consult (CM Consult): Discharge Planning, [address]: No  Reason Outside [place]: (pt choice)  Health Maintenance Reviewed: Yes  Physical Therapy Consult: Yes  Current Support Network: Lives with Spouse  Confirm Follow Up Transport: Self  The Plan for Transition of Care is Related to the Following Treatment Goals : [facility] and physician follow up   The Patient and/or Patient Representative was Provided with a Choice of Provider and Agrees with the Discharge Plan?: Yes  Name of the Patient Representative Who was Provided with a Choice of Provider and Agrees with the Discharge Plan: pt  Freedom of Choice List was Provided with Basic Dialogue that Supports the Patient's Individualized Plan of Care/Goals, Treatment Preferences and Shares the Quality Data Associated with the Providers?: Yes  Discharge Location  Discharge Placement: Home with home health([facility])

## 2020-07-21 NOTE — PROGRESS NOTES
Appears to have been a hematoma that was been opened and evacuated. Will hold perc drainage for now and continue IV abx.

## 2020-07-22 LAB
BACTERIA SPEC CULT: NORMAL
CC UR VC: NORMAL
SERVICE CMNT-IMP: NORMAL

## 2020-07-22 PROCEDURE — 74011250637 HC RX REV CODE- 250/637: Performed by: SURGERY

## 2020-07-22 PROCEDURE — 74011000258 HC RX REV CODE- 258: Performed by: SURGERY

## 2020-07-22 PROCEDURE — 65270000029 HC RM PRIVATE

## 2020-07-22 PROCEDURE — 74011250636 HC RX REV CODE- 250/636: Performed by: SURGERY

## 2020-07-22 PROCEDURE — 77030019952 HC CANSTR VAC ASST KCON -B

## 2020-07-22 PROCEDURE — 97605 NEG PRS WND THER DME<=50SQCM: CPT

## 2020-07-22 PROCEDURE — 77030019934 HC DRSG VAC ASST KCON -B

## 2020-07-22 RX ORDER — PROMETHAZINE HYDROCHLORIDE 25 MG/1
25 TABLET ORAL
Status: DISCONTINUED | OUTPATIENT
Start: 2020-07-22 | End: 2020-07-24 | Stop reason: HOSPADM

## 2020-07-22 RX ORDER — HYDROMORPHONE HYDROCHLORIDE 1 MG/ML
0.5 INJECTION, SOLUTION INTRAMUSCULAR; INTRAVENOUS; SUBCUTANEOUS
Status: DISCONTINUED | OUTPATIENT
Start: 2020-07-22 | End: 2020-07-23

## 2020-07-22 RX ADMIN — HYDROMORPHONE HYDROCHLORIDE 0.5 MG: 1 INJECTION, SOLUTION INTRAMUSCULAR; INTRAVENOUS; SUBCUTANEOUS at 20:16

## 2020-07-22 RX ADMIN — PIPERACILLIN AND TAZOBACTAM 3.38 G: 3; .375 INJECTION, POWDER, LYOPHILIZED, FOR SOLUTION INTRAVENOUS at 14:18

## 2020-07-22 RX ADMIN — DEXTROSE MONOHYDRATE, SODIUM CHLORIDE, AND POTASSIUM CHLORIDE 75 ML/HR: 50; 4.5; 1.49 INJECTION, SOLUTION INTRAVENOUS at 02:00

## 2020-07-22 RX ADMIN — ONDANSETRON HYDROCHLORIDE 4 MG: 2 INJECTION INTRAMUSCULAR; INTRAVENOUS at 09:17

## 2020-07-22 RX ADMIN — PROMETHAZINE HYDROCHLORIDE 12.5 MG: 25 INJECTION, SOLUTION INTRAMUSCULAR; INTRAVENOUS at 00:29

## 2020-07-22 RX ADMIN — PROMETHAZINE HYDROCHLORIDE 25 MG: 25 TABLET ORAL at 14:11

## 2020-07-22 RX ADMIN — PIPERACILLIN AND TAZOBACTAM 3.38 G: 3; .375 INJECTION, POWDER, LYOPHILIZED, FOR SOLUTION INTRAVENOUS at 02:00

## 2020-07-22 RX ADMIN — HYDROMORPHONE HYDROCHLORIDE 1 MG: 1 INJECTION, SOLUTION INTRAMUSCULAR; INTRAVENOUS; SUBCUTANEOUS at 03:09

## 2020-07-22 RX ADMIN — KETOROLAC TROMETHAMINE 30 MG: 30 INJECTION, SOLUTION INTRAMUSCULAR at 05:46

## 2020-07-22 RX ADMIN — ENOXAPARIN SODIUM 40 MG: 40 INJECTION SUBCUTANEOUS at 17:23

## 2020-07-22 RX ADMIN — PIPERACILLIN AND TAZOBACTAM 3.38 G: 3; .375 INJECTION, POWDER, LYOPHILIZED, FOR SOLUTION INTRAVENOUS at 09:17

## 2020-07-22 RX ADMIN — Medication 10 ML: at 21:00

## 2020-07-22 RX ADMIN — LORAZEPAM 1 MG: 2 INJECTION INTRAMUSCULAR; INTRAVENOUS at 12:20

## 2020-07-22 RX ADMIN — DEXTROSE MONOHYDRATE, SODIUM CHLORIDE, AND POTASSIUM CHLORIDE 25 ML/HR: 50; 4.5; 1.49 INJECTION, SOLUTION INTRAVENOUS at 17:00

## 2020-07-22 RX ADMIN — LORAZEPAM 1 MG: 1 TABLET ORAL at 20:59

## 2020-07-22 RX ADMIN — KETOROLAC TROMETHAMINE 30 MG: 30 INJECTION, SOLUTION INTRAMUSCULAR at 12:15

## 2020-07-22 RX ADMIN — BISACODYL 10 MG: 5 TABLET, COATED ORAL at 09:17

## 2020-07-22 RX ADMIN — LORAZEPAM 1 MG: 1 TABLET ORAL at 09:17

## 2020-07-22 RX ADMIN — HYDROMORPHONE HYDROCHLORIDE 1 MG: 1 INJECTION, SOLUTION INTRAMUSCULAR; INTRAVENOUS; SUBCUTANEOUS at 14:18

## 2020-07-22 RX ADMIN — PROMETHAZINE HYDROCHLORIDE 25 MG: 25 TABLET ORAL at 20:14

## 2020-07-22 RX ADMIN — PIPERACILLIN AND TAZOBACTAM 3.38 G: 3; .375 INJECTION, POWDER, LYOPHILIZED, FOR SOLUTION INTRAVENOUS at 20:15

## 2020-07-22 RX ADMIN — KETOROLAC TROMETHAMINE 30 MG: 30 INJECTION, SOLUTION INTRAMUSCULAR at 17:23

## 2020-07-22 RX ADMIN — HYDROMORPHONE HYDROCHLORIDE 1 MG: 1 INJECTION, SOLUTION INTRAMUSCULAR; INTRAVENOUS; SUBCUTANEOUS at 09:17

## 2020-07-22 NOTE — PROGRESS NOTES
Problem: Falls - Risk of  Goal: *Absence of Falls  Description: Document Jose Alfredo Bebo Fall Risk and appropriate interventions in the flowsheet.   Outcome: Progressing Towards Goal  Note: Fall Risk Interventions:  Mobility Interventions: Patient to call before getting OOB         Medication Interventions: Patient to call before getting OOB, Teach patient to arise slowly    Elimination Interventions: Call light in reach, Stay With Me (per policy)    History of Falls Interventions: Consult care management for discharge planning

## 2020-07-22 NOTE — PROGRESS NOTES
Problem: Falls - Risk of  Goal: *Absence of Falls  Description: Document [place] [facility] Fall Risk and appropriate interventions in the flowsheet. Outcome: Progressing Towards Goal  Note: Fall Risk Interventions:  Mobility Interventions: Bed/chair exit alarm, Patient to call before getting OOB, Utilize walker, cane, or other assistive device, Utilize gait belt for transfers/ambulation         Medication Interventions: Bed/chair exit alarm, Evaluate medications/consider consulting pharmacy, Patient to call before getting OOB, Teach patient to arise slowly    Elimination Interventions: Bed/chair exit alarm, Call light in reach, Patient to call for help with toileting needs, Stay With Me (per policy), Toileting schedule/hourly rounds    History of Falls Interventions: Consult care management for discharge planning         Problem: Pressure Injury - Risk of  Goal: *Prevention of pressure injury  Description: Document Keith Scale and appropriate interventions in the flowsheet. Outcome: Progressing Towards Goal  Note: Pressure Injury Interventions:  Sensory Interventions: Assess changes in LOC, Check visual cues for pain, Float heels, Keep linens dry and wrinkle-free, Maintain/enhance activity level, Minimize linen layers, Monitor skin under medical devices, Pressure redistribution bed/mattress (bed type), Turn and reposition approx. every two hours (pillows and wedges if needed)    Moisture Interventions: Absorbent underpads, Minimize layers, Limit adult briefs    Activity Interventions: Increase time out of bed, Pressure redistribution bed/mattress(bed type)    Mobility Interventions: Pressure redistribution bed/mattress (bed type), Turn and reposition approx.  every two hours(pillow and wedges)    Nutrition Interventions: Document food/fluid/supplement intake, Discuss nutritional consult with provider    Friction and Shear Interventions: Minimize layers                Problem: Patient Education: Go to Patient Education Activity  Goal: Patient/Family Education  Outcome: Progressing Towards Goal     Problem: Pain  Goal: *Control of Pain  Outcome: Progressing Towards Goal     Problem: Patient Education: Go to Patient Education Activity  Goal: Patient/Family Education  Outcome: Progressing Towards Goal     Problem: Patient Education: Go to Patient Education Activity  Goal: Patient/Family Education  Outcome: Progressing Towards Goal

## 2020-07-22 NOTE — PROGRESS NOTES
Looks good  AFVSS  Hematoma/wound with VAC  IV abx   Diet  Home when vac approved  Weaning off narcs

## 2020-07-22 NOTE — ROUTINE PROCESS
Bedside and Verbal shift change report given to REYNALDO Faith RN (oncoming nurse) by Akshat Portillo. Annalisa Zuniga RN (offgoing nurse). Report included the following information SBAR, Kardex, Intake/Output, MAR and Recent Results.

## 2020-07-22 NOTE — PROGRESS NOTES
Bedside shift change report received from SYDNIE Machado RN. Report included the following information SBAR, Kardex, Intake/Output, MAR, Recent Results, Med Rec Status and no cardiac monitoring and plan of care. 0915: Shift assessment complete. See flowsheet. 1220: Pt states zofran not working well. Has 24g in left thumb/palm area. No suitable for IV phenergan. Nona Samples RRT called to attempt IV in a location further up the arm. Pt declined offer to have phenergan switched to oral.  1230: Pt okay with oral phenergan. Will page MD to have order changed. 1245: Dr. Edel Jang paged  : Dr. Edel Jang called back and is okay with IV phenergan being switched to oral. Aware we are attempting to find additional IV site. 1534: Reassessment complete. See flowsheet.

## 2020-07-22 NOTE — PROGRESS NOTES
1935- Bedside and Verbal shift change report given to [address] (oncoming nurse) by Callie Fermin (offgoing nurse). Report included the following information SBAR, Kardex, Intake/Output, MAR and Recent Results. Shift Summary- Pt pain/discomfort managed with PRN medications. Wound Vac CDI.     0730- Bedside and Verbal shift change report given to Karsten Liriano (oncoming nurse) by [address] (offgoing nurse). Report included the following information SBAR, Kardex, Intake/Output, MAR and Recent Results.

## 2020-07-23 ENCOUNTER — HOME HEALTH ADMISSION (OUTPATIENT)
Dept: HOME HEALTH SERVICES | Facility: HOME HEALTH | Age: 45
End: 2020-07-23
Payer: COMMERCIAL

## 2020-07-23 LAB
C DIFF GDH STL QL: NEGATIVE
C DIFF TOX A+B STL QL IA: NEGATIVE
INTERPRETATION: NORMAL

## 2020-07-23 PROCEDURE — 74011250637 HC RX REV CODE- 250/637: Performed by: SURGERY

## 2020-07-23 PROCEDURE — 74011250636 HC RX REV CODE- 250/636: Performed by: SURGERY

## 2020-07-23 PROCEDURE — 74011000258 HC RX REV CODE- 258: Performed by: SURGERY

## 2020-07-23 PROCEDURE — 0107U C DIFF TOX AG DETCJ IA STOOL: CPT

## 2020-07-23 PROCEDURE — 65270000029 HC RM PRIVATE

## 2020-07-23 RX ORDER — IBUPROFEN 400 MG/1
800 TABLET ORAL
Status: DISCONTINUED | OUTPATIENT
Start: 2020-07-23 | End: 2020-07-24 | Stop reason: HOSPADM

## 2020-07-23 RX ADMIN — ENOXAPARIN SODIUM 40 MG: 40 INJECTION SUBCUTANEOUS at 15:41

## 2020-07-23 RX ADMIN — PIPERACILLIN AND TAZOBACTAM 3.38 G: 3; .375 INJECTION, POWDER, LYOPHILIZED, FOR SOLUTION INTRAVENOUS at 03:08

## 2020-07-23 RX ADMIN — PIPERACILLIN AND TAZOBACTAM 3.38 G: 3; .375 INJECTION, POWDER, LYOPHILIZED, FOR SOLUTION INTRAVENOUS at 22:01

## 2020-07-23 RX ADMIN — PROMETHAZINE HYDROCHLORIDE 25 MG: 25 TABLET ORAL at 10:49

## 2020-07-23 RX ADMIN — Medication 10 ML: at 22:09

## 2020-07-23 RX ADMIN — OXYCODONE HYDROCHLORIDE AND ACETAMINOPHEN 2 TABLET: 5; 325 TABLET ORAL at 22:03

## 2020-07-23 RX ADMIN — Medication 10 ML: at 14:18

## 2020-07-23 RX ADMIN — LORAZEPAM 1 MG: 1 TABLET ORAL at 22:02

## 2020-07-23 RX ADMIN — HYDROMORPHONE HYDROCHLORIDE 0.5 MG: 1 INJECTION, SOLUTION INTRAMUSCULAR; INTRAVENOUS; SUBCUTANEOUS at 03:08

## 2020-07-23 RX ADMIN — OXYCODONE HYDROCHLORIDE AND ACETAMINOPHEN 1 TABLET: 5; 325 TABLET ORAL at 17:48

## 2020-07-23 RX ADMIN — PIPERACILLIN AND TAZOBACTAM 3.38 G: 3; .375 INJECTION, POWDER, LYOPHILIZED, FOR SOLUTION INTRAVENOUS at 14:16

## 2020-07-23 RX ADMIN — PROMETHAZINE HYDROCHLORIDE 25 MG: 25 TABLET ORAL at 17:48

## 2020-07-23 RX ADMIN — LORAZEPAM 1 MG: 1 TABLET ORAL at 08:01

## 2020-07-23 RX ADMIN — PROMETHAZINE HYDROCHLORIDE 25 MG: 25 TABLET ORAL at 23:45

## 2020-07-23 RX ADMIN — Medication 10 ML: at 06:43

## 2020-07-23 RX ADMIN — BISACODYL 10 MG: 5 TABLET, COATED ORAL at 08:00

## 2020-07-23 RX ADMIN — PROMETHAZINE HYDROCHLORIDE 25 MG: 25 TABLET ORAL at 03:08

## 2020-07-23 RX ADMIN — IBUPROFEN 800 MG: 400 TABLET, FILM COATED ORAL at 23:45

## 2020-07-23 RX ADMIN — OXYCODONE HYDROCHLORIDE AND ACETAMINOPHEN 2 TABLET: 5; 325 TABLET ORAL at 10:48

## 2020-07-23 RX ADMIN — PIPERACILLIN AND TAZOBACTAM 3.38 G: 3; .375 INJECTION, POWDER, LYOPHILIZED, FOR SOLUTION INTRAVENOUS at 08:00

## 2020-07-23 NOTE — PROGRESS NOTES
D/C Plan: [facility] with wound vac     Anticipate pt will transition home with [address] [facility] and wound vac once wound vac has been approved. CM to continue to follow and assist with transition of care needs. 1200:  CM has been notified [facility] is now unable to accept this pt. CM met with pt and provided an update and requested an additional choice. Pt reviewed the list of [facility] agencies prevously provided and would like to use [facility]. CMS has been notified to assist.  Anticipate pt will transition home with [facility] for wound vac management with in the next 24 hours pending  wound vac is approval.      Care Management Interventions  PCP Verified by CM: Yes  Mode of Transport at Discharge:  Other (see comment)(Family)  Transition of Care Consult (CM Consult): Discharge Planning, [address]: No  Reason Outside [place]: (pt choice)  Health Maintenance Reviewed: Yes  Physical Therapy Consult: Yes  Current Support Network: Lives with Spouse  Confirm Follow Up Transport: Self  The Plan for Transition of Care is Related to the Following Treatment Goals : [address] [facility] and physician follow up   The Patient and/or Patient Representative was Provided with a Choice of Provider and Agrees with the Discharge Plan?: Yes  Name of the Patient Representative Who was Provided with a Choice of Provider and Agrees with the Discharge Plan: pt  Freedom of Choice List was Provided with Basic Dialogue that Supports the Patient's Individualized Plan of Care/Goals, Treatment Preferences and Shares the Quality Data Associated with the Providers?: Yes  Discharge Location  Discharge Placement: Home with home health([facility])
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
## 2020-07-23 NOTE — ADVANCED PRACTICE NURSE
CNS noted that pt stool specimen for C. Diff was sent to lab per protocol. Upon reviewing, pt does not fulfill requirement submit for C. Diff - pt has been taking dulcolax daily. Order was cancelled, and lab was informed.

## 2020-07-23 NOTE — PROGRESS NOTES
0730- Received verbal/bedside report from ANGELA Oconnell RN. Report included SBAR, Kardex and MAR.    2129- Patient has had several watery stools. Order placed for C Diff collection. Patient placed on contact. 1315- Stool sample sent to lab    Provided verbal/bedside report to Kerline. Report included SBAR, Kardex and MAR.

## 2020-07-23 NOTE — PROGRESS NOTES
Comprehensive Nutrition Assessment    Type and Reason for Visit: (P) Reassess    Nutrition Recommendations/Plan: Other: continue w/ POC    Nutrition Assessment:  (P) pt is on regular diet; she is eating cakes and such things, but complains still of nausea per nursing in rounds yesterday        Estimated Daily Nutrient Needs:  Energy (kcal):  (P) 2049  Protein (g):  (P) 64-77       Fluid (ml/day):  (P) 2049    Nutrition Related Findings:  PTA pt was exercising and eating better which helped her to lose 20lbs at home      Wounds:    (P) Surgical wound       Current Nutrition Therapies:   DIET NUTRITIONAL SUPPLEMENTS Breakfast, Lunch, Dinner; Ensure Clear  DIET REGULAR    Anthropometric Measures:  Height:  5' 4\" (162.6 cm)  Current Body Wt:  (P) 88.5 kg (195 lb)   Admission Body Wt:  (P) 201 lb    Usual Body Wt:        Ideal Body Wt:  120:  167.5 %   Adjusted Body Weight:   ; Weight Adjustment for: (P) No adjustment   Adjusted BMI:       BMI Categories:  (P) Obese class 1 (BMI 30.0-34. 9)       Nutrition Diagnosis:   (P) Inadequate oral intake related to (P) altered GI function as evidenced by (P) intake 26-50%    Nutrition Interventions:   Food and/or Nutrient Delivery: (P) Continue current diet, Continue oral nutrition supplement  Nutrition Education and Counseling: (P) No recommendations at this time  Coordination of Nutrition Care: (P) Continued inpatient monitoring, Interdisciplinary rounds    Goals:  (P) continue to encourage PO intake >50% at most meals 5-7days       Nutrition Monitoring and Evaluation:   Behavioral-Environmental Outcomes:    Food/Nutrient Intake Outcomes: (P) Diet advancement/tolerance, Food and nutrient intake, Supplement intake  Physical Signs/Symptoms Outcomes: (P) Biochemical data, Skin, Weight, GI status, Fluid status or edema    Discharge Planning:    (P) Continue current diet     Electronically signed by Jennifer Gunderson on 7/23/2020 at 1:22 PM

## 2020-07-24 VITALS
WEIGHT: 195.11 LBS | HEART RATE: 79 BPM | RESPIRATION RATE: 17 BRPM | DIASTOLIC BLOOD PRESSURE: 71 MMHG | TEMPERATURE: 98.4 F | OXYGEN SATURATION: 100 % | HEIGHT: 64 IN | BODY MASS INDEX: 33.31 KG/M2 | SYSTOLIC BLOOD PRESSURE: 135 MMHG

## 2020-07-24 PROCEDURE — 74011000258 HC RX REV CODE- 258: Performed by: SURGERY

## 2020-07-24 PROCEDURE — 74011250637 HC RX REV CODE- 250/637: Performed by: SURGERY

## 2020-07-24 PROCEDURE — 74011250636 HC RX REV CODE- 250/636: Performed by: SURGERY

## 2020-07-24 RX ORDER — NALOXONE HYDROCHLORIDE 4 MG/.1ML
SPRAY NASAL
Qty: 1 EACH | Refills: 0 | Status: SHIPPED | OUTPATIENT
Start: 2020-07-24 | End: 2022-09-01

## 2020-07-24 RX ORDER — AMOXICILLIN AND CLAVULANATE POTASSIUM 875; 125 MG/1; MG/1
1 TABLET, FILM COATED ORAL 2 TIMES DAILY
Qty: 14 TAB | Refills: 0 | Status: SHIPPED | OUTPATIENT
Start: 2020-07-24 | End: 2022-09-01

## 2020-07-24 RX ORDER — OXYCODONE AND ACETAMINOPHEN 5; 325 MG/1; MG/1
1 TABLET ORAL
Qty: 20 TAB | Refills: 0 | Status: SHIPPED | OUTPATIENT
Start: 2020-07-24 | End: 2020-07-29

## 2020-07-24 RX ADMIN — Medication 10 ML: at 05:09

## 2020-07-24 RX ADMIN — PIPERACILLIN AND TAZOBACTAM 3.38 G: 3; .375 INJECTION, POWDER, LYOPHILIZED, FOR SOLUTION INTRAVENOUS at 03:16

## 2020-07-24 RX ADMIN — OXYCODONE HYDROCHLORIDE AND ACETAMINOPHEN 2 TABLET: 5; 325 TABLET ORAL at 05:09

## 2020-07-24 RX ADMIN — PIPERACILLIN AND TAZOBACTAM 3.38 G: 3; .375 INJECTION, POWDER, LYOPHILIZED, FOR SOLUTION INTRAVENOUS at 14:06

## 2020-07-24 RX ADMIN — PROMETHAZINE HYDROCHLORIDE 25 MG: 25 TABLET ORAL at 10:04

## 2020-07-24 RX ADMIN — OXYCODONE HYDROCHLORIDE AND ACETAMINOPHEN 2 TABLET: 5; 325 TABLET ORAL at 10:04

## 2020-07-24 RX ADMIN — LORAZEPAM 1 MG: 1 TABLET ORAL at 09:54

## 2020-07-24 RX ADMIN — OXYCODONE HYDROCHLORIDE AND ACETAMINOPHEN 2 TABLET: 5; 325 TABLET ORAL at 14:06

## 2020-07-24 RX ADMIN — BISACODYL 10 MG: 5 TABLET, COATED ORAL at 09:54

## 2020-07-24 RX ADMIN — PIPERACILLIN AND TAZOBACTAM 3.38 G: 3; .375 INJECTION, POWDER, LYOPHILIZED, FOR SOLUTION INTRAVENOUS at 09:56

## 2020-07-24 NOTE — PROGRESS NOTES
Problem: Falls - Risk of  Goal: *Absence of Falls  Description: Document Terrance Bethea Fall Risk and appropriate interventions in the flowsheet. Outcome: Resolved/Met     Problem: Pressure Injury - Risk of  Goal: *Prevention of pressure injury  Description: Document Keith Scale and appropriate interventions in the flowsheet. Outcome: Resolved/Met     Problem: Pain  Goal: *Control of Pain  Outcome: Resolved/Met     Problem: Risk for Spread of Infection  Goal: Prevent transmission of infectious organism to others  Description: Prevent the transmission of infectious organisms to other patients, staff members, and visitors.   Outcome: Resolved/Met

## 2020-07-24 NOTE — DISCHARGE INSTRUCTIONS
Post-Operative Discharge Instructions  Elisabeth Parikh M.D.  [address] [place] [address]  [phone]    Patient: Jeanne Lake MRN: [id number]  [facility]: [id number]    YOB: 1975  Age: 38 y.o. Sex: female    DOA: 7/10/2020 LOS:  LOS: 14 days   Discharge Date:      Acute Diagnoses:  SBO (small bowel obstruction) ([facility] [K56.609]  SBO (small bowel obstruction) ([facility] [K15.084]    Chronic Medical Diagnoses:  Problem List as of 2020 Date Reviewed: 3/12/2020          Codes Class Noted - Resolved    KATI (generalized anxiety disorder) (Chronic) ICD-10-CM: F41.1  ICD-9-CM: 300.02  2014 - Present        Depressive disorder, not elsewhere classified (Chronic) ICD-10-CM: F32.9  ICD-9-CM: 263  2014 - Present        Opioid dependence in controlled environment ([facility] (Chronic) ICD-10-CM: S32.65  ICD-9-CM: 304.00  2014 - Present        SBO (small bowel obstruction) ([facility] ICD-10-CM: P20.118  ICD-9-CM: 560.9  7/10/2020 - Present        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 Resume prior to surgery diet as tolerated. Activity  1. Do not drive a car or operate any hazardous machinery the day of surgery. 2. Rest quietly today. 3. No bending or heavy lifting. 4. You may resume other prior to surgery activities as tolerated. 5. You may remove the bandage and shower in 1 day. Drain / Wound Care  1. Follow all drain / wound care instructions exactly as explained by the Nurse at time of discharge.   2. Apply an ice pack to the surgical site for 48 hours. 3. Do not put any salves or ointments on the wound. Allow it to form a dry scab. 4. Leave steri-strips / Dermabond alone. They should be allowed to fall off on their own in 7-14 days. Medications  1. It is important to take your medications exactly as they are prescribed. 2. Keep your medication in the bottles provided by the pharmacist, and keep a list of the medication names, dosages, and times they should be taken in your wallet. Call 911 anytime you think you may need emergency care. For example, call if:  · You passed out (lost consciousness). · You have severe trouble breathing. · You have sudden chest pain and shortness of breath. Notify your Surgeon for any of the followin. Fever, chills, nausea, vomiting, severe abdominal pain or bleeding. 2. If you experience any redness or discharge or sign of infection. 3. Persistent nausea lasting more than 24 hours. If you are unable to reach your Surgeon for any of the symptoms above, you should proceed directly to the nearest Emergency Department. Post-Operative Appointment Information    Call Dr. Juliann Salter office tomorrow morning at [phone] to schedule a post-operative office visit in one (1) week. If any questions or concerns arise, call your Surgeon at [phone].

## 2020-07-24 NOTE — PROGRESS NOTES
conducted a Follow up consultation and Spiritual Assessment for Jose Miguel Pierson, who is a 38 y.o.,female. She requests prayers for healing and release. Continued the relationship of care and support. Listened empathically. Offered prayer and assurance of continued prayer on patients behalf. Chart reviewed. Patient expressed gratitude for Spiritual Care visit. Chaplains will continue to follow and will provide pastoral care as needed or requested.  recommends bedside caregivers page the  on duty if patient shows signs of acute spiritual or emotional distress.      [facility]   [phone]

## 2020-07-24 NOTE — PROGRESS NOTES
Problem: Falls - Risk of  Goal: *Absence of Falls  Description: Document Rikki Tejada Fall Risk and appropriate interventions in the flowsheet. Outcome: Progressing Towards Goal  Note: Fall Risk Interventions:  Mobility Interventions: Patient to call before getting OOB         Medication Interventions: Patient to call before getting OOB    Elimination Interventions: Call light in reach    History of Falls Interventions: Consult care management for discharge planning         Problem: Patient Education: Go to Patient Education Activity  Goal: Patient/Family Education  Outcome: Progressing Towards Goal     Problem: Pressure Injury - Risk of  Goal: *Prevention of pressure injury  Description: Document Keith Scale and appropriate interventions in the flowsheet. Outcome: Progressing Towards Goal  Note: Pressure Injury Interventions:  Sensory Interventions: Assess changes in LOC    Moisture Interventions: Absorbent underpads    Activity Interventions: Increase time out of bed    Mobility Interventions: Pressure redistribution bed/mattress (bed type)    Nutrition Interventions: Offer support with meals,snacks and hydration, Document food/fluid/supplement intake    Friction and Shear Interventions: Minimize layers                Problem: Patient Education: Go to Patient Education Activity  Goal: Patient/Family Education  Outcome: Progressing Towards Goal     Problem: Pain  Goal: *Control of Pain  Outcome: Progressing Towards Goal     Problem: Patient Education: Go to Patient Education Activity  Goal: Patient/Family Education  Outcome: Progressing Towards Goal     Problem: Patient Education: Go to Patient Education Activity  Goal: Patient/Family Education  Outcome: Progressing Towards Goal     Problem: Risk for Spread of Infection  Goal: Prevent transmission of infectious organism to others  Description: Prevent the transmission of infectious organisms to other patients, staff members, and visitors.   Outcome: Progressing Towards Goal     Problem: Patient Education:  Go to Education Activity  Goal: Patient/Family Education  Outcome: Progressing Towards Goal

## 2020-07-24 NOTE — PROGRESS NOTES
D/C Plan: [address] with wound vac 7/24/2020 vs 7/25/2020    Awaiting physician signature to assist wound care with facilitating home wound vac. Anticipate pt will transition home with [address] once wound vac is released. KEON CHRISSY [facility] is aware pt will transition home later today vs tomorrow pending release of home wound vac. CM to continue to follow and assist as needed. .    Care Management Interventions  PCP Verified by CM: Yes  Mode of Transport at Discharge: Other (see comment)(Family)  Transition of Care Consult (CM Consult): Discharge Planning, [address]: Yes  Health Maintenance Reviewed: Yes  Physical Therapy Consult: Yes  Current Support Network: Lives with Spouse  Confirm Follow Up Transport: Self  The Plan for Transition of Care is Related to the Following Treatment Goals :  [facility] and physician follow up   The Patient and/or Patient Representative was Provided with a Choice of Provider and Agrees with the Discharge Plan?: Yes  Name of the Patient Representative Who was Provided with a Choice of Provider and Agrees with the Discharge Plan: pt  Freedom of Choice List was Provided with Basic Dialogue that Supports the Patient's Individualized Plan of Care/Goals, Treatment Preferences and Shares the Quality Data Associated with the Providers?: Yes  Discharge Location  Discharge Placement: Home with home health(BS)

## 2020-07-24 NOTE — PROGRESS NOTES
Bedside and verbal report given to GABRIEL Sifuentes RN (oncoming nurse) by Cami Guzman RN  (off going nurse). Report included the following information SBAR, Kardex, OR Summary, Intake/Output, and MAR. Pt complained of pain approximately 2 hours before her next PRN dose was available. States that she needs something stronger. Dr. Lester Mathew added 800 Ibuprofen PO q8. Pt NAD and had no acute changes. Wound vac draining. Bedside and verbal report given by (off going nurse) GABRIEL Sifuentes RN to (oncoming nurse) CLAUDE Eller RN. Report included the following information SBAR, Kardex, OR Summary, Intake/Output, and MAR.

## 2020-07-25 LAB
BACTERIA SPEC CULT: NORMAL
SERVICE CMNT-IMP: NORMAL

## 2020-07-27 ENCOUNTER — HOME CARE VISIT (OUTPATIENT)
Dept: SCHEDULING | Facility: HOME HEALTH | Age: 45
End: 2020-07-27
Payer: COMMERCIAL

## 2020-07-27 PROCEDURE — 400013 HH SOC

## 2020-07-27 PROCEDURE — G0299 HHS/HOSPICE OF RN EA 15 MIN: HCPCS

## 2020-07-28 VITALS
SYSTOLIC BLOOD PRESSURE: 120 MMHG | OXYGEN SATURATION: 98 % | RESPIRATION RATE: 16 BRPM | HEART RATE: 85 BPM | TEMPERATURE: 97.3 F | DIASTOLIC BLOOD PRESSURE: 72 MMHG

## 2020-07-28 PROCEDURE — A4216 STERILE WATER/SALINE, 10 ML: HCPCS

## 2020-07-28 PROCEDURE — A9270 NON-COVERED ITEM OR SERVICE: HCPCS

## 2020-07-28 PROCEDURE — A5120 SKIN BARRIER, WIPE OR SWAB: HCPCS

## 2020-07-28 PROCEDURE — A4649 SURGICAL SUPPLIES: HCPCS

## 2020-07-28 PROCEDURE — A6260 WOUND CLEANSER ANY TYPE/SIZE: HCPCS

## 2020-07-28 PROCEDURE — A6216 NON-STERILE GAUZE<=16 SQ IN: HCPCS

## 2020-07-30 ENCOUNTER — HOME CARE VISIT (OUTPATIENT)
Dept: SCHEDULING | Facility: HOME HEALTH | Age: 45
End: 2020-07-30
Payer: COMMERCIAL

## 2020-07-30 PROCEDURE — G0300 HHS/HOSPICE OF LPN EA 15 MIN: HCPCS

## 2020-07-30 PROCEDURE — E1399 DURABLE MEDICAL EQUIPMENT MI: HCPCS

## 2020-07-31 NOTE — DISCHARGE SUMMARY
Discharge Summary    Patient: Riley Ramachandran MRN: [id number]  CSN: [id number]    YOB: 1975  Age: 38 y.o. 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 ([place] [address] ICD-10-CM: F10.24  ICD-9-CM: 291.89, 303.90  8/7/2017 - Present        SOB (shortness of breath) ICD-10-CM: R06.02  ICD-9-CM: 786.05  6/15/2017 - Present        Chronic cystitis ICD-10-CM: N30.20  ICD-9-CM: 595.2  9/25/2015 - Present        Abdominal pain ICD-10-CM: R10.9  ICD-9-CM: 789.00  6/23/2014 - Present        Fibromyalgia ICD-10-CM: M79.7  ICD-9-CM: 729.1  7/28/2010 - Present        KATI (generalized anxiety disorder) (Chronic) ICD-10-CM: F41.1  ICD-9-CM: 300.02  5/27/2014 - Present        Depressive disorder, not elsewhere classified (Chronic) ICD-10-CM: F32.9  ICD-9-CM: 076  5/27/2014 - Present        Opioid dependence in controlled environment ([place] [address] (Chronic) ICD-10-CM: F11.20  ICD-9-CM: 304.00  5/27/2014 - Present              Discharge Condition: Good    Hospital Course: Admitted for SBO and exp lap for internal hernia. Consults: None    Significant Diagnostic Studies: see chart    Discharge Medications:   Cannot display discharge medications since this patient is not currently admitted.       Activity: Activity as tolerated and no driving for today    Diet: Regular Diet    Wound Care: Wound Vac    Follow-up: 1 week

## 2020-08-01 ENCOUNTER — HOME CARE VISIT (OUTPATIENT)
Dept: SCHEDULING | Facility: HOME HEALTH | Age: 45
End: 2020-08-01
Payer: COMMERCIAL

## 2020-08-01 PROCEDURE — G0299 HHS/HOSPICE OF RN EA 15 MIN: HCPCS

## 2020-08-02 VITALS
SYSTOLIC BLOOD PRESSURE: 109 MMHG | DIASTOLIC BLOOD PRESSURE: 75 MMHG | OXYGEN SATURATION: 98 % | HEART RATE: 96 BPM | RESPIRATION RATE: 18 BRPM | TEMPERATURE: 97.5 F

## 2020-08-02 VITALS
OXYGEN SATURATION: 97 % | HEART RATE: 80 BPM | DIASTOLIC BLOOD PRESSURE: 80 MMHG | SYSTOLIC BLOOD PRESSURE: 118 MMHG | TEMPERATURE: 97.3 F

## 2020-08-03 ENCOUNTER — HOME CARE VISIT (OUTPATIENT)
Dept: SCHEDULING | Facility: HOME HEALTH | Age: 45
End: 2020-08-03
Payer: COMMERCIAL

## 2020-08-03 VITALS
DIASTOLIC BLOOD PRESSURE: 82 MMHG | TEMPERATURE: 97.7 F | HEART RATE: 99 BPM | OXYGEN SATURATION: 99 % | RESPIRATION RATE: 16 BRPM | SYSTOLIC BLOOD PRESSURE: 120 MMHG

## 2020-08-03 PROCEDURE — G0299 HHS/HOSPICE OF RN EA 15 MIN: HCPCS

## 2020-08-05 ENCOUNTER — HOME CARE VISIT (OUTPATIENT)
Dept: SCHEDULING | Facility: HOME HEALTH | Age: 45
End: 2020-08-05
Payer: COMMERCIAL

## 2020-08-05 PROCEDURE — G0300 HHS/HOSPICE OF LPN EA 15 MIN: HCPCS

## 2020-08-07 ENCOUNTER — HOME CARE VISIT (OUTPATIENT)
Dept: HOME HEALTH SERVICES | Facility: HOME HEALTH | Age: 45
End: 2020-08-07
Payer: COMMERCIAL

## 2020-08-08 ENCOUNTER — HOME CARE VISIT (OUTPATIENT)
Dept: HOME HEALTH SERVICES | Facility: HOME HEALTH | Age: 45
End: 2020-08-08
Payer: COMMERCIAL

## 2020-08-08 VITALS
OXYGEN SATURATION: 97 % | DIASTOLIC BLOOD PRESSURE: 76 MMHG | RESPIRATION RATE: 18 BRPM | SYSTOLIC BLOOD PRESSURE: 128 MMHG | TEMPERATURE: 97.8 F | HEART RATE: 78 BPM

## 2020-08-08 PROCEDURE — G0299 HHS/HOSPICE OF RN EA 15 MIN: HCPCS

## 2020-08-10 ENCOUNTER — HOME CARE VISIT (OUTPATIENT)
Dept: SCHEDULING | Facility: HOME HEALTH | Age: 45
End: 2020-08-10
Payer: COMMERCIAL

## 2020-08-10 VITALS
RESPIRATION RATE: 16 BRPM | HEART RATE: 91 BPM | TEMPERATURE: 97.5 F | SYSTOLIC BLOOD PRESSURE: 130 MMHG | DIASTOLIC BLOOD PRESSURE: 78 MMHG | OXYGEN SATURATION: 99 %

## 2020-08-10 PROCEDURE — G0299 HHS/HOSPICE OF RN EA 15 MIN: HCPCS

## 2020-08-12 ENCOUNTER — HOME CARE VISIT (OUTPATIENT)
Dept: SCHEDULING | Facility: HOME HEALTH | Age: 45
End: 2020-08-12
Payer: COMMERCIAL

## 2020-08-12 VITALS
OXYGEN SATURATION: 98 % | RESPIRATION RATE: 16 BRPM | DIASTOLIC BLOOD PRESSURE: 78 MMHG | SYSTOLIC BLOOD PRESSURE: 120 MMHG | TEMPERATURE: 96.9 F | HEART RATE: 82 BPM

## 2020-08-12 VITALS
HEART RATE: 77 BPM | SYSTOLIC BLOOD PRESSURE: 136 MMHG | OXYGEN SATURATION: 100 % | TEMPERATURE: 97.6 F | DIASTOLIC BLOOD PRESSURE: 78 MMHG

## 2020-08-12 PROCEDURE — G0299 HHS/HOSPICE OF RN EA 15 MIN: HCPCS

## 2020-08-14 ENCOUNTER — HOME CARE VISIT (OUTPATIENT)
Dept: SCHEDULING | Facility: HOME HEALTH | Age: 45
End: 2020-08-14
Payer: COMMERCIAL

## 2020-08-14 VITALS
OXYGEN SATURATION: 99 % | RESPIRATION RATE: 16 BRPM | TEMPERATURE: 96.4 F | HEART RATE: 86 BPM | SYSTOLIC BLOOD PRESSURE: 118 MMHG | DIASTOLIC BLOOD PRESSURE: 82 MMHG

## 2020-08-14 PROCEDURE — G0299 HHS/HOSPICE OF RN EA 15 MIN: HCPCS

## 2020-08-17 ENCOUNTER — HOME CARE VISIT (OUTPATIENT)
Dept: SCHEDULING | Facility: HOME HEALTH | Age: 45
End: 2020-08-17
Payer: COMMERCIAL

## 2020-08-17 VITALS
SYSTOLIC BLOOD PRESSURE: 140 MMHG | RESPIRATION RATE: 16 BRPM | OXYGEN SATURATION: 98 % | TEMPERATURE: 97.3 F | HEART RATE: 74 BPM | DIASTOLIC BLOOD PRESSURE: 78 MMHG

## 2020-08-17 PROCEDURE — G0299 HHS/HOSPICE OF RN EA 15 MIN: HCPCS

## 2020-08-19 ENCOUNTER — HOME CARE VISIT (OUTPATIENT)
Dept: SCHEDULING | Facility: HOME HEALTH | Age: 45
End: 2020-08-19
Payer: COMMERCIAL

## 2020-08-19 VITALS
RESPIRATION RATE: 16 BRPM | OXYGEN SATURATION: 98 % | TEMPERATURE: 97.4 F | DIASTOLIC BLOOD PRESSURE: 72 MMHG | HEART RATE: 77 BPM | SYSTOLIC BLOOD PRESSURE: 120 MMHG

## 2020-08-19 PROCEDURE — G0299 HHS/HOSPICE OF RN EA 15 MIN: HCPCS

## 2020-08-21 ENCOUNTER — HOME CARE VISIT (OUTPATIENT)
Dept: SCHEDULING | Facility: HOME HEALTH | Age: 45
End: 2020-08-21

## 2020-08-21 PROCEDURE — G0300 HHS/HOSPICE OF LPN EA 15 MIN: HCPCS

## 2020-08-24 ENCOUNTER — HOME CARE VISIT (OUTPATIENT)
Dept: SCHEDULING | Facility: HOME HEALTH | Age: 45
End: 2020-08-24
Payer: COMMERCIAL

## 2020-08-24 PROCEDURE — G0299 HHS/HOSPICE OF RN EA 15 MIN: HCPCS

## 2020-08-25 VITALS
RESPIRATION RATE: 16 BRPM | OXYGEN SATURATION: 98 % | DIASTOLIC BLOOD PRESSURE: 70 MMHG | SYSTOLIC BLOOD PRESSURE: 110 MMHG | TEMPERATURE: 97.3 F

## 2020-08-26 ENCOUNTER — HOME CARE VISIT (OUTPATIENT)
Dept: SCHEDULING | Facility: HOME HEALTH | Age: 45
End: 2020-08-26
Payer: COMMERCIAL

## 2020-08-26 VITALS
TEMPERATURE: 98.2 F | OXYGEN SATURATION: 98 % | RESPIRATION RATE: 16 BRPM | DIASTOLIC BLOOD PRESSURE: 76 MMHG | HEART RATE: 72 BPM | SYSTOLIC BLOOD PRESSURE: 116 MMHG

## 2020-08-26 VITALS
RESPIRATION RATE: 16 BRPM | OXYGEN SATURATION: 98 % | HEART RATE: 78 BPM | TEMPERATURE: 98 F | DIASTOLIC BLOOD PRESSURE: 60 MMHG | SYSTOLIC BLOOD PRESSURE: 120 MMHG

## 2020-08-26 PROCEDURE — 400013 HH SOC

## 2020-08-26 PROCEDURE — G0299 HHS/HOSPICE OF RN EA 15 MIN: HCPCS

## 2020-09-16 NOTE — PROGRESS NOTES
Bedside and Verbal shift change report given to TRAM Armas RN (oncoming nurse) by Shruthi Monte (offgoing nurse). Report included the following information SBAR, Kardex, Intake/Output and MAR. Patient A/OX4. Patient in bed resting quietly, No complaints at this time. 0800-Spoke with , Update given    0930-Dr. Aisha Webster in at assess patient. LR Fluid Bolus of 1500 ML ordered. 1030-Patient requested something for pain. Administered Toradol 30 MG IV. Pain 4/10    1150--Spoke with , update given. 1201-Perfect serve message sent to Dr. Amanda Yang regarding Naloxone order. 1256-Bolus complete, regular continuous fluids resumed. Patient requested something for Anxiety. Administered Ativan 1 MG IV.    1430-Dr. Lopez paged regarding Naloxone order. Returned page, Order can be released. 1432-Order released, awaiting approval from pharmacy    1655-Patient requesting something for a headache, administered Toradol 30 MG IV. Pain 5/10    1810-Spoke with , update given    SHIFT SUMMARY:  Pain medication 2X during shift  1929-Bedside and Verbal shift change report given to [address] (oncoming nurse) by Aníbal Lewis (offgoing nurse). Report included the following information SBAR, Kardex, Intake/Output and MAR. Yes, please schedule follow up visit.

## 2020-11-30 ENCOUNTER — HOSPITAL ENCOUNTER (EMERGENCY)
Age: 45
Discharge: HOME | End: 2020-11-30
Attending: EMERGENCY MEDICINE
Payer: COMMERCIAL

## 2020-11-30 VITALS
OXYGEN SATURATION: 100 % | HEIGHT: 64 IN | RESPIRATION RATE: 14 BRPM | SYSTOLIC BLOOD PRESSURE: 143 MMHG | TEMPERATURE: 98 F | BODY MASS INDEX: 32.44 KG/M2 | DIASTOLIC BLOOD PRESSURE: 88 MMHG | WEIGHT: 190 LBS | HEART RATE: 90 BPM

## 2020-11-30 DIAGNOSIS — R10.2 VAGINAL PAIN: Primary | ICD-10-CM

## 2020-11-30 LAB
APPEARANCE UR: ABNORMAL
BACTERIA URNS QL MICRO: ABNORMAL /HPF
BILIRUB UR QL: NEGATIVE
COLOR UR: YELLOW
EPITH CASTS URNS QL MICRO: ABNORMAL /LPF (ref 0–5)
GLUCOSE UR STRIP.AUTO-MCNC: NEGATIVE MG/DL
HCG UR QL: NEGATIVE
HGB UR QL STRIP: NEGATIVE
KETONES UR QL STRIP.AUTO: NEGATIVE MG/DL
LEUKOCYTE ESTERASE UR QL STRIP.AUTO: ABNORMAL
NITRITE UR QL STRIP.AUTO: NEGATIVE
PH UR STRIP: 6 [PH] (ref 5–8)
PROT UR STRIP-MCNC: ABNORMAL MG/DL
RBC #/AREA URNS HPF: ABNORMAL /HPF (ref 0–5)
SERVICE CMNT-IMP: NORMAL
SP GR UR REFRACTOMETRY: 1.03 (ref 1–1.03)
UROBILINOGEN UR QL STRIP.AUTO: 1 EU/DL (ref 0.2–1)
WBC URNS QL MICRO: ABNORMAL /HPF (ref 0–4)
WET PREP GENITAL: NORMAL

## 2020-11-30 PROCEDURE — 87491 CHLMYD TRACH DNA AMP PROBE: CPT

## 2020-11-30 PROCEDURE — 81001 URINALYSIS AUTO W/SCOPE: CPT

## 2020-11-30 PROCEDURE — 87210 SMEAR WET MOUNT SALINE/INK: CPT

## 2020-11-30 PROCEDURE — 99283 EMERGENCY DEPT VISIT LOW MDM: CPT

## 2020-11-30 PROCEDURE — 81025 URINE PREGNANCY TEST: CPT

## 2020-11-30 RX ORDER — DOXYLAMINE SUCCINATE 25 MG
TABLET ORAL 2 TIMES DAILY
Qty: 15 G | Refills: 0 | Status: SHIPPED | OUTPATIENT
Start: 2020-11-30

## 2020-11-30 RX ORDER — MICONAZOLE NITRATE 200 MG/1
200 SUPPOSITORY VAGINAL
Qty: 3 SUPPOSITORY | Refills: 0 | Status: SHIPPED | OUTPATIENT
Start: 2020-11-30 | End: 2020-12-03

## 2020-11-30 NOTE — ED TRIAGE NOTES
Vaginal pain since Saturday, no vaginal d/c. No urinary s/s. Pt was dx with bladder infection yesterday at [facility], was given bactrim Rx and has taken 3 doses. Pt states she does not think she had a bladder infection and states vaginal pain has worsened.
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
## 2022-08-30 ENCOUNTER — HOSPITAL ENCOUNTER (OUTPATIENT)
Dept: PREADMISSION TESTING | Age: 47
Discharge: HOME | End: 2022-08-30
Payer: COMMERCIAL

## 2022-08-30 ENCOUNTER — TRANSCRIBE ORDER (OUTPATIENT)
Dept: REGISTRATION | Age: 47
End: 2022-08-30

## 2022-08-30 DIAGNOSIS — D25.9 LEIOMYOMA OF UTERUS, UNSPECIFIED: ICD-10-CM

## 2022-08-30 DIAGNOSIS — D25.9 LEIOMYOMA OF UTERUS, UNSPECIFIED: Primary | ICD-10-CM

## 2022-08-30 LAB
BASOPHILS # BLD: 0 K/UL (ref 0–0.1)
BASOPHILS NFR BLD: 1 % (ref 0–2)
DIFFERENTIAL METHOD BLD: ABNORMAL
EOSINOPHIL # BLD: 0.1 K/UL (ref 0–0.4)
EOSINOPHIL NFR BLD: 3 % (ref 0–5)
ERYTHROCYTE [DISTWIDTH] IN BLOOD BY AUTOMATED COUNT: 12.9 % (ref 11.6–14.5)
HCT VFR BLD AUTO: 41.4 % (ref 35–45)
HGB BLD-MCNC: 13 G/DL (ref 12–16)
IMM GRANULOCYTES # BLD AUTO: 0.1 K/UL (ref 0–0.04)
IMM GRANULOCYTES NFR BLD AUTO: 1 % (ref 0–0.5)
LYMPHOCYTES # BLD: 1.2 K/UL (ref 0.9–3.6)
LYMPHOCYTES NFR BLD: 22 % (ref 21–52)
MCH RBC QN AUTO: 27.6 PG (ref 24–34)
MCHC RBC AUTO-ENTMCNC: 31.4 G/DL (ref 31–37)
MCV RBC AUTO: 87.9 FL (ref 78–100)
MONOCYTES # BLD: 0.4 K/UL (ref 0.05–1.2)
MONOCYTES NFR BLD: 7 % (ref 3–10)
NEUTS SEG # BLD: 3.7 K/UL (ref 1.8–8)
NEUTS SEG NFR BLD: 67 % (ref 40–73)
NRBC # BLD: 0 K/UL (ref 0–0.01)
NRBC BLD-RTO: 0 PER 100 WBC
PLATELET # BLD AUTO: 171 K/UL (ref 135–420)
PMV BLD AUTO: 9 FL (ref 9.2–11.8)
RBC # BLD AUTO: 4.71 M/UL (ref 4.2–5.3)
WBC # BLD AUTO: 5.5 K/UL (ref 4.6–13.2)

## 2022-08-30 PROCEDURE — 85025 COMPLETE CBC W/AUTO DIFF WBC: CPT

## 2022-08-30 PROCEDURE — 86923 COMPATIBILITY TEST ELECTRIC: CPT

## 2022-08-30 PROCEDURE — 36415 COLL VENOUS BLD VENIPUNCTURE: CPT

## 2022-08-30 PROCEDURE — 86900 BLOOD TYPING SEROLOGIC ABO: CPT

## 2022-09-01 ENCOUNTER — HOSPITAL ENCOUNTER (OUTPATIENT)
Dept: PREADMISSION TESTING | Age: 47
Discharge: HOME | End: 2022-09-01

## 2022-09-01 VITALS — BODY MASS INDEX: 37.56 KG/M2 | WEIGHT: 220 LBS | HEIGHT: 64 IN

## 2022-09-01 RX ORDER — LEVOTHYROXINE SODIUM 50 UG/1
TABLET ORAL
COMMUNITY

## 2022-09-01 RX ORDER — UREA 10 %
100 LOTION (ML) TOPICAL DAILY
COMMUNITY

## 2022-09-01 RX ORDER — CEFAZOLIN SODIUM/WATER 2 G/20 ML
2 SYRINGE (ML) INTRAVENOUS
Status: CANCELLED | OUTPATIENT
Start: 2022-09-12 | End: 2022-09-13

## 2022-09-01 RX ORDER — SODIUM CHLORIDE, SODIUM LACTATE, POTASSIUM CHLORIDE, CALCIUM CHLORIDE 600; 310; 30; 20 MG/100ML; MG/100ML; MG/100ML; MG/100ML
125 INJECTION, SOLUTION INTRAVENOUS CONTINUOUS
Status: CANCELLED | OUTPATIENT
Start: 2022-09-12

## 2022-09-08 ENCOUNTER — ANESTHESIA EVENT (OUTPATIENT)
Dept: SURGERY | Age: 47
DRG: 742 | End: 2022-09-08
Payer: COMMERCIAL

## 2022-09-09 NOTE — PERIOP NOTES
CAMILO Francis to fax over Hp and Consent for Monday cases. Will fax in [address] when it comes.

## 2022-09-12 ENCOUNTER — HOSPITAL ENCOUNTER (INPATIENT)
Age: 47
LOS: 7 days | Discharge: HOME HEALTH | DRG: 742 | End: 2022-09-19
Attending: OBSTETRICS & GYNECOLOGY | Admitting: OBSTETRICS & GYNECOLOGY
Payer: COMMERCIAL

## 2022-09-12 ENCOUNTER — APPOINTMENT (OUTPATIENT)
Dept: GENERAL RADIOLOGY | Age: 47
DRG: 742 | End: 2022-09-12
Attending: INTERNAL MEDICINE
Payer: COMMERCIAL

## 2022-09-12 ENCOUNTER — ANESTHESIA (OUTPATIENT)
Dept: SURGERY | Age: 47
DRG: 742 | End: 2022-09-12
Payer: COMMERCIAL

## 2022-09-12 ENCOUNTER — APPOINTMENT (OUTPATIENT)
Dept: CT IMAGING | Age: 47
DRG: 742 | End: 2022-09-12
Attending: INTERNAL MEDICINE
Payer: COMMERCIAL

## 2022-09-12 PROBLEM — R06.02 SOB (SHORTNESS OF BREATH): Status: RESOLVED | Noted: 2017-06-15 | Resolved: 2022-09-12

## 2022-09-12 PROBLEM — N17.9 ACUTE RENAL FAILURE (ARF) (HCC): Status: ACTIVE | Noted: 2022-09-12

## 2022-09-12 PROBLEM — S37.20XA BLADDER INJURY: Status: ACTIVE | Noted: 2022-09-12

## 2022-09-12 PROBLEM — F10.24 ALCOHOL-INDUCED DEPRESSIVE DISORDER WITH MODERATE OR SEVERE USE DISORDER (HCC): Status: RESOLVED | Noted: 2017-08-07 | Resolved: 2022-09-12

## 2022-09-12 PROBLEM — K56.609 SBO (SMALL BOWEL OBSTRUCTION) (HCC): Status: RESOLVED | Noted: 2020-07-10 | Resolved: 2022-09-12

## 2022-09-12 PROBLEM — Z90.710 S/P ABDOMINAL HYSTERECTOMY: Status: ACTIVE | Noted: 2022-09-12

## 2022-09-12 PROBLEM — Z78.9 POOR INTRAVENOUS ACCESS: Status: ACTIVE | Noted: 2022-09-12

## 2022-09-12 PROBLEM — D25.9 FIBROID UTERUS: Status: ACTIVE | Noted: 2022-09-12

## 2022-09-12 PROBLEM — R31.9 HEMATURIA: Status: ACTIVE | Noted: 2022-09-12

## 2022-09-12 PROBLEM — R00.0 SINUS TACHYCARDIA: Status: ACTIVE | Noted: 2022-09-12

## 2022-09-12 PROBLEM — R57.1 HYPOVOLEMIC SHOCK (HCC): Status: ACTIVE | Noted: 2022-09-12

## 2022-09-12 LAB
ALBUMIN SERPL-MCNC: 2.9 G/DL (ref 3.4–5)
ALBUMIN/GLOB SERPL: 1.3 {RATIO} (ref 0.8–1.7)
ALP SERPL-CCNC: 53 U/L (ref 45–117)
ALT SERPL-CCNC: 105 U/L (ref 13–56)
ANION GAP BLD CALC-SCNC: 13 MMOL/L (ref 10–20)
ANION GAP SERPL CALC-SCNC: 11 MMOL/L (ref 3–18)
APTT PPP: 24.6 SEC (ref 23–36.4)
ARTERIAL PATENCY WRIST A: POSITIVE
AST SERPL-CCNC: 105 U/L (ref 10–38)
BASE DEFICIT BLD-SCNC: 8.2 MMOL/L
BASOPHILS # BLD: 0 K/UL (ref 0–0.1)
BASOPHILS NFR BLD: 0 % (ref 0–2)
BDY SITE: ABNORMAL
BILIRUB SERPL-MCNC: 1.6 MG/DL (ref 0.2–1)
BODY TEMPERATURE: 98.2
BUN SERPL-MCNC: 15 MG/DL (ref 7–18)
BUN/CREAT SERPL: 14 (ref 12–20)
CA-I BLD-MCNC: 0.96 MMOL/L (ref 1.12–1.32)
CALCIUM SERPL-MCNC: 7.3 MG/DL (ref 8.5–10.1)
CHLORIDE BLD-SCNC: 106 MMOL/L (ref 98–107)
CHLORIDE SERPL-SCNC: 109 MMOL/L (ref 100–111)
CO2 BLD-SCNC: 16 MMOL/L (ref 19–24)
CO2 SERPL-SCNC: 17 MMOL/L (ref 21–32)
CREAT BLD-MCNC: 1.11 MG/DL (ref 0.6–1.3)
CREAT SERPL-MCNC: 1.11 MG/DL (ref 0.6–1.3)
DIFFERENTIAL METHOD BLD: ABNORMAL
EOSINOPHIL # BLD: 0 K/UL (ref 0–0.4)
EOSINOPHIL NFR BLD: 0 % (ref 0–5)
ERYTHROCYTE [DISTWIDTH] IN BLOOD BY AUTOMATED COUNT: 15.8 % (ref 11.6–14.5)
FIBRINOGEN PPP-MCNC: 258 MG/DL (ref 210–451)
FIO2 ON VENT: 4 %
GAS FLOW.O2 O2 DELIVERY SYS: ABNORMAL L/MIN
GLOBULIN SER CALC-MCNC: 2.2 G/DL (ref 2–4)
GLUCOSE BLD STRIP.AUTO-MCNC: 185 MG/DL (ref 70–110)
GLUCOSE BLD-MCNC: 204 MG/DL (ref 65–100)
GLUCOSE SERPL-MCNC: 215 MG/DL (ref 74–99)
HCG UR QL: NEGATIVE
HCO3 BLD-SCNC: 16.2 MMOL/L (ref 22–26)
HCT VFR BLD AUTO: 18.6 % (ref 35–45)
HCT VFR BLD AUTO: 28.4 % (ref 35–45)
HCT VFR BLD AUTO: 42 % (ref 35–45)
HGB BLD-MCNC: 13.2 G/DL (ref 12–16)
HGB BLD-MCNC: 6 G/DL (ref 12–16)
HGB BLD-MCNC: 9.6 G/DL (ref 12–16)
HISTORY CHECKED?,CKHIST: NORMAL
IMM GRANULOCYTES # BLD AUTO: 0.1 K/UL (ref 0–0.04)
IMM GRANULOCYTES NFR BLD AUTO: 1 % (ref 0–0.5)
INR PPP: 1.2 (ref 0.8–1.2)
LACTATE BLD-SCNC: 5.8 MMOL/L (ref 0.4–2)
LIPASE SERPL-CCNC: 223 U/L (ref 73–393)
LYMPHOCYTES # BLD: 0.9 K/UL (ref 0.9–3.6)
LYMPHOCYTES NFR BLD: 6 % (ref 21–52)
MCH RBC QN AUTO: 28.6 PG (ref 24–34)
MCHC RBC AUTO-ENTMCNC: 33.8 G/DL (ref 31–37)
MCV RBC AUTO: 84.5 FL (ref 78–100)
MONOCYTES # BLD: 1 K/UL (ref 0.05–1.2)
MONOCYTES NFR BLD: 6 % (ref 3–10)
NEUTS SEG # BLD: 14.9 K/UL (ref 1.8–8)
NEUTS SEG NFR BLD: 88 % (ref 40–73)
NRBC # BLD: 0 K/UL (ref 0–0.01)
NRBC BLD-RTO: 0 PER 100 WBC
PCO2 BLD: 28.3 MMHG (ref 35–45)
PH BLD: 7.37 [PH] (ref 7.35–7.45)
PLATELET # BLD AUTO: 157 K/UL (ref 135–420)
PMV BLD AUTO: 9.5 FL (ref 9.2–11.8)
PO2 BLD: 143 MMHG (ref 80–100)
POTASSIUM BLD-SCNC: 4.6 MMOL/L (ref 3.5–5.1)
POTASSIUM SERPL-SCNC: 5 MMOL/L (ref 3.5–5.5)
PROT SERPL-MCNC: 5.1 G/DL (ref 6.4–8.2)
PROTHROMBIN TIME: 15.9 SEC (ref 11.5–15.2)
RBC # BLD AUTO: 3.36 M/UL (ref 4.2–5.3)
SAO2 % BLD: 99 %
SERVICE CMNT-IMP: ABNORMAL
SODIUM BLD-SCNC: 134 MMOL/L (ref 136–145)
SODIUM SERPL-SCNC: 137 MMOL/L (ref 136–145)
SPECIMEN SITE: ABNORMAL
WBC # BLD AUTO: 16.9 K/UL (ref 4.6–13.2)

## 2022-09-12 PROCEDURE — 77030040361 HC SLV COMPR DVT MDII -B: Performed by: OBSTETRICS & GYNECOLOGY

## 2022-09-12 PROCEDURE — 77030003028 HC SUT VCRL J&J -A: Performed by: OBSTETRICS & GYNECOLOGY

## 2022-09-12 PROCEDURE — 74011250636 HC RX REV CODE- 250/636: Performed by: OBSTETRICS & GYNECOLOGY

## 2022-09-12 PROCEDURE — 77030019927 HC TBNG IRR CYSTO BAXT -A: Performed by: OBSTETRICS & GYNECOLOGY

## 2022-09-12 PROCEDURE — 85610 PROTHROMBIN TIME: CPT

## 2022-09-12 PROCEDURE — 0UT90ZZ RESECTION OF UTERUS, OPEN APPROACH: ICD-10-PCS | Performed by: OBSTETRICS & GYNECOLOGY

## 2022-09-12 PROCEDURE — 74011250637 HC RX REV CODE- 250/637: Performed by: OBSTETRICS & GYNECOLOGY

## 2022-09-12 PROCEDURE — 74011000258 HC RX REV CODE- 258: Performed by: ANESTHESIOLOGY

## 2022-09-12 PROCEDURE — P9047 ALBUMIN (HUMAN), 25%, 50ML: HCPCS | Performed by: INTERNAL MEDICINE

## 2022-09-12 PROCEDURE — 77030040830 HC CATH URETH FOL MDII -A: Performed by: OBSTETRICS & GYNECOLOGY

## 2022-09-12 PROCEDURE — 83690 ASSAY OF LIPASE: CPT

## 2022-09-12 PROCEDURE — 85018 HEMOGLOBIN: CPT

## 2022-09-12 PROCEDURE — 65610000006 HC RM INTENSIVE CARE

## 2022-09-12 PROCEDURE — 85025 COMPLETE CBC W/AUTO DIFF WBC: CPT

## 2022-09-12 PROCEDURE — 77030002916 HC SUT ETHLN J&J -A: Performed by: OBSTETRICS & GYNECOLOGY

## 2022-09-12 PROCEDURE — 0DNW0ZZ RELEASE PERITONEUM, OPEN APPROACH: ICD-10-PCS | Performed by: OBSTETRICS & GYNECOLOGY

## 2022-09-12 PROCEDURE — 83036 HEMOGLOBIN GLYCOSYLATED A1C: CPT

## 2022-09-12 PROCEDURE — 36415 COLL VENOUS BLD VENIPUNCTURE: CPT

## 2022-09-12 PROCEDURE — P9047 ALBUMIN (HUMAN), 25%, 50ML: HCPCS | Performed by: ANESTHESIOLOGY

## 2022-09-12 PROCEDURE — 85730 THROMBOPLASTIN TIME PARTIAL: CPT

## 2022-09-12 PROCEDURE — C1769 GUIDE WIRE: HCPCS | Performed by: OBSTETRICS & GYNECOLOGY

## 2022-09-12 PROCEDURE — 74011250636 HC RX REV CODE- 250/636: Performed by: NURSE ANESTHETIST, CERTIFIED REGISTERED

## 2022-09-12 PROCEDURE — 0T160ZB BYPASS RIGHT URETER TO BLADDER, OPEN APPROACH: ICD-10-PCS | Performed by: UROLOGY

## 2022-09-12 PROCEDURE — 81025 URINE PREGNANCY TEST: CPT

## 2022-09-12 PROCEDURE — 74011000250 HC RX REV CODE- 250: Performed by: ANESTHESIOLOGY

## 2022-09-12 PROCEDURE — 74011250636 HC RX REV CODE- 250/636: Performed by: ANESTHESIOLOGY

## 2022-09-12 PROCEDURE — 77030020782 HC GWN BAIR PAWS FLX 3M -B: Performed by: OBSTETRICS & GYNECOLOGY

## 2022-09-12 PROCEDURE — 74011250637 HC RX REV CODE- 250/637: Performed by: ANESTHESIOLOGY

## 2022-09-12 PROCEDURE — 0TSB0ZZ REPOSITION BLADDER, OPEN APPROACH: ICD-10-PCS | Performed by: UROLOGY

## 2022-09-12 PROCEDURE — 77030006643: Performed by: ANESTHESIOLOGY

## 2022-09-12 PROCEDURE — 76210000003 HC OR PH I REC 3.5 TO 4 HR: Performed by: OBSTETRICS & GYNECOLOGY

## 2022-09-12 PROCEDURE — 77030008683 HC TU ET CUF COVD -A: Performed by: ANESTHESIOLOGY

## 2022-09-12 PROCEDURE — 74011250636 HC RX REV CODE- 250/636: Performed by: INTERNAL MEDICINE

## 2022-09-12 PROCEDURE — 74011000258 HC RX REV CODE- 258: Performed by: OBSTETRICS & GYNECOLOGY

## 2022-09-12 PROCEDURE — 77030020407 HC IV BLD WRMR ST 3M -A: Performed by: ANESTHESIOLOGY

## 2022-09-12 PROCEDURE — C2617 STENT, NON-COR, TEM W/O DEL: HCPCS | Performed by: OBSTETRICS & GYNECOLOGY

## 2022-09-12 PROCEDURE — 77030031139 HC SUT VCRL2 J&J -A: Performed by: OBSTETRICS & GYNECOLOGY

## 2022-09-12 PROCEDURE — 77030002933 HC SUT MCRYL J&J -A: Performed by: OBSTETRICS & GYNECOLOGY

## 2022-09-12 PROCEDURE — 85384 FIBRINOGEN ACTIVITY: CPT

## 2022-09-12 PROCEDURE — 74011000250 HC RX REV CODE- 250: Performed by: INTERNAL MEDICINE

## 2022-09-12 PROCEDURE — 76060000045 HC ANESTHESIA 7 TO 7.5 HR: Performed by: OBSTETRICS & GYNECOLOGY

## 2022-09-12 PROCEDURE — 74011000258 HC RX REV CODE- 258: Performed by: INTERNAL MEDICINE

## 2022-09-12 PROCEDURE — 82962 GLUCOSE BLOOD TEST: CPT

## 2022-09-12 PROCEDURE — P9016 RBC LEUKOCYTES REDUCED: HCPCS

## 2022-09-12 PROCEDURE — 2709999900 HC NON-CHARGEABLE SUPPLY: Performed by: OBSTETRICS & GYNECOLOGY

## 2022-09-12 PROCEDURE — 77030008477 HC STYL SATN SLP COVD -A: Performed by: ANESTHESIOLOGY

## 2022-09-12 PROCEDURE — 76010000142 HC OR TIME 7 TO 7.5 HR: Performed by: OBSTETRICS & GYNECOLOGY

## 2022-09-12 PROCEDURE — 82947 ASSAY GLUCOSE BLOOD QUANT: CPT

## 2022-09-12 PROCEDURE — 74011000250 HC RX REV CODE- 250: Performed by: NURSE ANESTHETIST, CERTIFIED REGISTERED

## 2022-09-12 PROCEDURE — 74011000250 HC RX REV CODE- 250: Performed by: OBSTETRICS & GYNECOLOGY

## 2022-09-12 PROCEDURE — 74011250636 HC RX REV CODE- 250/636

## 2022-09-12 PROCEDURE — 77030002996 HC SUT SLK J&J -A: Performed by: OBSTETRICS & GYNECOLOGY

## 2022-09-12 PROCEDURE — 77030040506 HC DRN WND MDII -A: Performed by: OBSTETRICS & GYNECOLOGY

## 2022-09-12 PROCEDURE — 77030008463 HC STPLR SKN PROX J&J -B: Performed by: OBSTETRICS & GYNECOLOGY

## 2022-09-12 PROCEDURE — 71045 X-RAY EXAM CHEST 1 VIEW: CPT

## 2022-09-12 PROCEDURE — 88307 TISSUE EXAM BY PATHOLOGIST: CPT

## 2022-09-12 PROCEDURE — 80053 COMPREHEN METABOLIC PANEL: CPT

## 2022-09-12 PROCEDURE — 74011636637 HC RX REV CODE- 636/637: Performed by: INTERNAL MEDICINE

## 2022-09-12 DEVICE — URETERAL STENT SET
Type: IMPLANTABLE DEVICE | Site: URETER | Status: FUNCTIONAL
Brand: POLARIS™ ULTRA

## 2022-09-12 RX ORDER — NORTRIPTYLINE HYDROCHLORIDE 10 MG/1
10 CAPSULE ORAL
Status: DISCONTINUED | OUTPATIENT
Start: 2022-09-12 | End: 2022-09-19 | Stop reason: HOSPADM

## 2022-09-12 RX ORDER — CEFAZOLIN SODIUM/WATER 2 G/20 ML
2 SYRINGE (ML) INTRAVENOUS
Status: COMPLETED | OUTPATIENT
Start: 2022-09-12 | End: 2022-09-12

## 2022-09-12 RX ORDER — SODIUM CHLORIDE, SODIUM LACTATE, POTASSIUM CHLORIDE, CALCIUM CHLORIDE 600; 310; 30; 20 MG/100ML; MG/100ML; MG/100ML; MG/100ML
INJECTION, SOLUTION INTRAVENOUS
Status: DISCONTINUED | OUTPATIENT
Start: 2022-09-12 | End: 2022-09-12 | Stop reason: HOSPADM

## 2022-09-12 RX ORDER — UREA 10 %
100 LOTION (ML) TOPICAL DAILY
Status: DISCONTINUED | OUTPATIENT
Start: 2022-09-12 | End: 2022-09-19 | Stop reason: HOSPADM

## 2022-09-12 RX ORDER — NALOXONE HYDROCHLORIDE 0.4 MG/ML
0.2 INJECTION, SOLUTION INTRAMUSCULAR; INTRAVENOUS; SUBCUTANEOUS AS NEEDED
Status: DISCONTINUED | OUTPATIENT
Start: 2022-09-12 | End: 2022-09-12 | Stop reason: HOSPADM

## 2022-09-12 RX ORDER — SODIUM CHLORIDE 0.9 % (FLUSH) 0.9 %
5-40 SYRINGE (ML) INJECTION EVERY 8 HOURS
Status: DISCONTINUED | OUTPATIENT
Start: 2022-09-12 | End: 2022-09-13 | Stop reason: SDUPTHER

## 2022-09-12 RX ORDER — SODIUM CHLORIDE, SODIUM LACTATE, POTASSIUM CHLORIDE, CALCIUM CHLORIDE 600; 310; 30; 20 MG/100ML; MG/100ML; MG/100ML; MG/100ML
125 INJECTION, SOLUTION INTRAVENOUS CONTINUOUS
Status: DISCONTINUED | OUTPATIENT
Start: 2022-09-12 | End: 2022-09-12

## 2022-09-12 RX ORDER — HYDROCODONE BITARTRATE AND ACETAMINOPHEN 10; 325 MG/1; MG/1
1 TABLET ORAL
Status: DISCONTINUED | OUTPATIENT
Start: 2022-09-12 | End: 2022-09-19 | Stop reason: HOSPADM

## 2022-09-12 RX ORDER — SCOLOPAMINE TRANSDERMAL SYSTEM 1 MG/1
1 PATCH, EXTENDED RELEASE TRANSDERMAL
Status: DISCONTINUED | OUTPATIENT
Start: 2022-09-12 | End: 2022-09-12 | Stop reason: HOSPADM

## 2022-09-12 RX ORDER — SODIUM CHLORIDE 9 MG/ML
250 INJECTION, SOLUTION INTRAVENOUS AS NEEDED
Status: DISCONTINUED | OUTPATIENT
Start: 2022-09-12 | End: 2022-09-15 | Stop reason: SDUPTHER

## 2022-09-12 RX ORDER — SODIUM CHLORIDE, SODIUM LACTATE, POTASSIUM CHLORIDE, CALCIUM CHLORIDE 600; 310; 30; 20 MG/100ML; MG/100ML; MG/100ML; MG/100ML
1000 INJECTION, SOLUTION INTRAVENOUS CONTINUOUS
Status: DISCONTINUED | OUTPATIENT
Start: 2022-09-12 | End: 2022-09-12 | Stop reason: HOSPADM

## 2022-09-12 RX ORDER — FENTANYL CITRATE-0.9 % NACL/PF 900MCG/30
PATIENT CONTROLLED ANALGESIA VIAL INJECTION
Status: DISCONTINUED | OUTPATIENT
Start: 2022-09-12 | End: 2022-09-12

## 2022-09-12 RX ORDER — SODIUM CHLORIDE, SODIUM LACTATE, POTASSIUM CHLORIDE, CALCIUM CHLORIDE 600; 310; 30; 20 MG/100ML; MG/100ML; MG/100ML; MG/100ML
150 INJECTION, SOLUTION INTRAVENOUS CONTINUOUS
Status: DISPENSED | OUTPATIENT
Start: 2022-09-12 | End: 2022-09-13

## 2022-09-12 RX ORDER — VANCOMYCIN 2 GRAM/500 ML IN 0.9 % SODIUM CHLORIDE INTRAVENOUS
2000 ONCE
Status: COMPLETED | OUTPATIENT
Start: 2022-09-12 | End: 2022-09-12

## 2022-09-12 RX ORDER — SODIUM CHLORIDE 9 MG/ML
INJECTION, SOLUTION INTRAVENOUS
Status: DISCONTINUED | OUTPATIENT
Start: 2022-09-12 | End: 2022-09-12 | Stop reason: HOSPADM

## 2022-09-12 RX ORDER — NOREPINEPHRINE BITARTRATE/D5W 8 MG/250ML
4-16 PLASTIC BAG, INJECTION (ML) INTRAVENOUS
Status: DISCONTINUED | OUTPATIENT
Start: 2022-09-12 | End: 2022-09-15

## 2022-09-12 RX ORDER — HYDROMORPHONE HYDROCHLORIDE 1 MG/ML
INJECTION, SOLUTION INTRAMUSCULAR; INTRAVENOUS; SUBCUTANEOUS
Status: DISPENSED
Start: 2022-09-12 | End: 2022-09-13

## 2022-09-12 RX ORDER — GABAPENTIN 300 MG/1
CAPSULE ORAL
COMMUNITY
Start: 2022-08-31

## 2022-09-12 RX ORDER — ALBUTEROL SULFATE 0.83 MG/ML
2.5 SOLUTION RESPIRATORY (INHALATION) AS NEEDED
Status: DISCONTINUED | OUTPATIENT
Start: 2022-09-12 | End: 2022-09-12 | Stop reason: HOSPADM

## 2022-09-12 RX ORDER — DEXAMETHASONE SODIUM PHOSPHATE 4 MG/ML
INJECTION, SOLUTION INTRA-ARTICULAR; INTRALESIONAL; INTRAMUSCULAR; INTRAVENOUS; SOFT TISSUE AS NEEDED
Status: DISCONTINUED | OUTPATIENT
Start: 2022-09-12 | End: 2022-09-12 | Stop reason: HOSPADM

## 2022-09-12 RX ORDER — ONDANSETRON 2 MG/ML
4 INJECTION INTRAMUSCULAR; INTRAVENOUS ONCE
Status: DISCONTINUED | OUTPATIENT
Start: 2022-09-12 | End: 2022-09-12 | Stop reason: HOSPADM

## 2022-09-12 RX ORDER — DROPERIDOL 2.5 MG/ML
INJECTION, SOLUTION INTRAMUSCULAR; INTRAVENOUS AS NEEDED
Status: DISCONTINUED | OUTPATIENT
Start: 2022-09-12 | End: 2022-09-12 | Stop reason: HOSPADM

## 2022-09-12 RX ORDER — NOREPINEPHRINE BITARTRATE/D5W 8 MG/250ML
PLASTIC BAG, INJECTION (ML) INTRAVENOUS
Status: DISPENSED
Start: 2022-09-12 | End: 2022-09-13

## 2022-09-12 RX ORDER — LIDOCAINE HYDROCHLORIDE 10 MG/ML
5 INJECTION, SOLUTION EPIDURAL; INFILTRATION; INTRACAUDAL; PERINEURAL ONCE
Status: ACTIVE | OUTPATIENT
Start: 2022-09-12 | End: 2022-09-13

## 2022-09-12 RX ORDER — CEFAZOLIN SODIUM/WATER 2 G/20 ML
2 SYRINGE (ML) INTRAVENOUS EVERY 8 HOURS
Status: DISCONTINUED | OUTPATIENT
Start: 2022-09-12 | End: 2022-09-12 | Stop reason: ALTCHOICE

## 2022-09-12 RX ORDER — FENTANYL CITRATE 50 UG/ML
INJECTION, SOLUTION INTRAMUSCULAR; INTRAVENOUS AS NEEDED
Status: DISCONTINUED | OUTPATIENT
Start: 2022-09-12 | End: 2022-09-12 | Stop reason: HOSPADM

## 2022-09-12 RX ORDER — MIDAZOLAM HYDROCHLORIDE 1 MG/ML
INJECTION, SOLUTION INTRAMUSCULAR; INTRAVENOUS AS NEEDED
Status: DISCONTINUED | OUTPATIENT
Start: 2022-09-12 | End: 2022-09-12 | Stop reason: HOSPADM

## 2022-09-12 RX ORDER — SODIUM CHLORIDE 0.9 % (FLUSH) 0.9 %
5-40 SYRINGE (ML) INJECTION AS NEEDED
Status: DISCONTINUED | OUTPATIENT
Start: 2022-09-12 | End: 2022-09-19 | Stop reason: HOSPADM

## 2022-09-12 RX ORDER — ONDANSETRON 2 MG/ML
INJECTION INTRAMUSCULAR; INTRAVENOUS AS NEEDED
Status: DISCONTINUED | OUTPATIENT
Start: 2022-09-12 | End: 2022-09-12 | Stop reason: HOSPADM

## 2022-09-12 RX ORDER — ROCURONIUM BROMIDE 10 MG/ML
INJECTION, SOLUTION INTRAVENOUS AS NEEDED
Status: DISCONTINUED | OUTPATIENT
Start: 2022-09-12 | End: 2022-09-12 | Stop reason: HOSPADM

## 2022-09-12 RX ORDER — LEVOTHYROXINE SODIUM 50 UG/1
50 TABLET ORAL
Status: DISCONTINUED | OUTPATIENT
Start: 2022-09-12 | End: 2022-09-19 | Stop reason: HOSPADM

## 2022-09-12 RX ORDER — HYDROMORPHONE HYDROCHLORIDE 1 MG/ML
0.5 INJECTION, SOLUTION INTRAMUSCULAR; INTRAVENOUS; SUBCUTANEOUS
Status: DISCONTINUED | OUTPATIENT
Start: 2022-09-12 | End: 2022-09-12 | Stop reason: HOSPADM

## 2022-09-12 RX ORDER — FLUMAZENIL 0.1 MG/ML
0.2 INJECTION INTRAVENOUS
Status: DISCONTINUED | OUTPATIENT
Start: 2022-09-12 | End: 2022-09-12 | Stop reason: HOSPADM

## 2022-09-12 RX ORDER — DEXMEDETOMIDINE HYDROCHLORIDE 100 UG/ML
INJECTION, SOLUTION INTRAVENOUS AS NEEDED
Status: DISCONTINUED | OUTPATIENT
Start: 2022-09-12 | End: 2022-09-12 | Stop reason: HOSPADM

## 2022-09-12 RX ORDER — ALBUMIN HUMAN 50 G/1000ML
25 SOLUTION INTRAVENOUS ONCE
Status: DISCONTINUED | OUTPATIENT
Start: 2022-09-12 | End: 2022-09-12 | Stop reason: HOSPADM

## 2022-09-12 RX ORDER — CALCIUM GLUCONATE 20 MG/ML
1 INJECTION, SOLUTION INTRAVENOUS ONCE
Status: COMPLETED | OUTPATIENT
Start: 2022-09-12 | End: 2022-09-12

## 2022-09-12 RX ORDER — HYDROMORPHONE HYDROCHLORIDE 2 MG/ML
INJECTION, SOLUTION INTRAMUSCULAR; INTRAVENOUS; SUBCUTANEOUS AS NEEDED
Status: DISCONTINUED | OUTPATIENT
Start: 2022-09-12 | End: 2022-09-12 | Stop reason: HOSPADM

## 2022-09-12 RX ORDER — KETAMINE HYDROCHLORIDE 10 MG/ML
INJECTION, SOLUTION INTRAMUSCULAR; INTRAVENOUS AS NEEDED
Status: DISCONTINUED | OUTPATIENT
Start: 2022-09-12 | End: 2022-09-12 | Stop reason: HOSPADM

## 2022-09-12 RX ORDER — EPHEDRINE SULFATE/0.9% NACL/PF 50 MG/5 ML
SYRINGE (ML) INTRAVENOUS AS NEEDED
Status: DISCONTINUED | OUTPATIENT
Start: 2022-09-12 | End: 2022-09-12 | Stop reason: HOSPADM

## 2022-09-12 RX ORDER — GLYCOPYRROLATE 0.2 MG/ML
INJECTION INTRAMUSCULAR; INTRAVENOUS AS NEEDED
Status: DISCONTINUED | OUTPATIENT
Start: 2022-09-12 | End: 2022-09-12 | Stop reason: HOSPADM

## 2022-09-12 RX ORDER — KETOROLAC TROMETHAMINE 30 MG/ML
15 INJECTION, SOLUTION INTRAMUSCULAR; INTRAVENOUS
Status: COMPLETED | OUTPATIENT
Start: 2022-09-12 | End: 2022-09-12

## 2022-09-12 RX ORDER — ACETAMINOPHEN 325 MG/1
650 TABLET ORAL
Status: DISCONTINUED | OUTPATIENT
Start: 2022-09-12 | End: 2022-09-19 | Stop reason: HOSPADM

## 2022-09-12 RX ORDER — HYDROMORPHONE HYDROCHLORIDE 1 MG/ML
1 INJECTION, SOLUTION INTRAMUSCULAR; INTRAVENOUS; SUBCUTANEOUS
Status: DISCONTINUED | OUTPATIENT
Start: 2022-09-12 | End: 2022-09-12

## 2022-09-12 RX ORDER — ONDANSETRON 2 MG/ML
4 INJECTION INTRAMUSCULAR; INTRAVENOUS
Status: COMPLETED | OUTPATIENT
Start: 2022-09-12 | End: 2022-09-12

## 2022-09-12 RX ORDER — SODIUM CHLORIDE 9 MG/ML
250 INJECTION, SOLUTION INTRAVENOUS AS NEEDED
Status: DISCONTINUED | OUTPATIENT
Start: 2022-09-12 | End: 2022-09-12 | Stop reason: HOSPADM

## 2022-09-12 RX ORDER — PHENYLEPHRINE HCL IN 0.9% NACL 1 MG/10 ML
SYRINGE (ML) INTRAVENOUS AS NEEDED
Status: DISCONTINUED | OUTPATIENT
Start: 2022-09-12 | End: 2022-09-12 | Stop reason: HOSPADM

## 2022-09-12 RX ORDER — SODIUM CHLORIDE 0.9 % (FLUSH) 0.9 %
5-40 SYRINGE (ML) INJECTION EVERY 8 HOURS
Status: DISCONTINUED | OUTPATIENT
Start: 2022-09-12 | End: 2022-09-19 | Stop reason: HOSPADM

## 2022-09-12 RX ORDER — INSULIN LISPRO 100 [IU]/ML
INJECTION, SOLUTION INTRAVENOUS; SUBCUTANEOUS EVERY 6 HOURS
Status: DISCONTINUED | OUTPATIENT
Start: 2022-09-13 | End: 2022-09-18

## 2022-09-12 RX ORDER — HYDROMORPHONE HYDROCHLORIDE 1 MG/ML
1 INJECTION, SOLUTION INTRAMUSCULAR; INTRAVENOUS; SUBCUTANEOUS
Status: DISCONTINUED | OUTPATIENT
Start: 2022-09-12 | End: 2022-09-14

## 2022-09-12 RX ORDER — DEXTROSE MONOHYDRATE 100 MG/ML
0-250 INJECTION, SOLUTION INTRAVENOUS AS NEEDED
Status: DISCONTINUED | OUTPATIENT
Start: 2022-09-12 | End: 2022-09-19 | Stop reason: HOSPADM

## 2022-09-12 RX ORDER — SODIUM CHLORIDE 0.9 % (FLUSH) 0.9 %
5-40 SYRINGE (ML) INJECTION AS NEEDED
Status: DISCONTINUED | OUTPATIENT
Start: 2022-09-12 | End: 2022-09-13 | Stop reason: SDUPTHER

## 2022-09-12 RX ORDER — PROPOFOL 10 MG/ML
INJECTION, EMULSION INTRAVENOUS AS NEEDED
Status: DISCONTINUED | OUTPATIENT
Start: 2022-09-12 | End: 2022-09-12 | Stop reason: HOSPADM

## 2022-09-12 RX ORDER — FENTANYL CITRATE 50 UG/ML
25 INJECTION, SOLUTION INTRAMUSCULAR; INTRAVENOUS AS NEEDED
Status: DISCONTINUED | OUTPATIENT
Start: 2022-09-12 | End: 2022-09-12 | Stop reason: HOSPADM

## 2022-09-12 RX ORDER — ALBUMIN HUMAN 250 G/1000ML
12.5 SOLUTION INTRAVENOUS ONCE
Status: COMPLETED | OUTPATIENT
Start: 2022-09-12 | End: 2022-09-12

## 2022-09-12 RX ORDER — ONDANSETRON 2 MG/ML
INJECTION INTRAMUSCULAR; INTRAVENOUS
Status: COMPLETED
Start: 2022-09-12 | End: 2022-09-12

## 2022-09-12 RX ORDER — ALBUMIN HUMAN 250 G/1000ML
12.5 SOLUTION INTRAVENOUS EVERY 6 HOURS
Status: DISCONTINUED | OUTPATIENT
Start: 2022-09-13 | End: 2022-09-14

## 2022-09-12 RX ORDER — LIDOCAINE HYDROCHLORIDE 20 MG/ML
INJECTION, SOLUTION EPIDURAL; INFILTRATION; INTRACAUDAL; PERINEURAL AS NEEDED
Status: DISCONTINUED | OUTPATIENT
Start: 2022-09-12 | End: 2022-09-12 | Stop reason: HOSPADM

## 2022-09-12 RX ORDER — OXYCODONE AND ACETAMINOPHEN 5; 325 MG/1; MG/1
1 TABLET ORAL AS NEEDED
Status: DISCONTINUED | OUTPATIENT
Start: 2022-09-12 | End: 2022-09-12 | Stop reason: HOSPADM

## 2022-09-12 RX ORDER — ALBUMIN HUMAN 250 G/1000ML
SOLUTION INTRAVENOUS AS NEEDED
Status: DISCONTINUED | OUTPATIENT
Start: 2022-09-12 | End: 2022-09-12 | Stop reason: HOSPADM

## 2022-09-12 RX ORDER — DIPHENHYDRAMINE HYDROCHLORIDE 50 MG/ML
12.5 INJECTION, SOLUTION INTRAMUSCULAR; INTRAVENOUS
Status: DISCONTINUED | OUTPATIENT
Start: 2022-09-12 | End: 2022-09-12 | Stop reason: HOSPADM

## 2022-09-12 RX ORDER — MAGNESIUM SULFATE 100 %
4 CRYSTALS MISCELLANEOUS AS NEEDED
Status: DISCONTINUED | OUTPATIENT
Start: 2022-09-12 | End: 2022-09-19 | Stop reason: HOSPADM

## 2022-09-12 RX ORDER — CLONAZEPAM 0.5 MG/1
0.5 TABLET ORAL 3 TIMES DAILY
Status: DISCONTINUED | OUTPATIENT
Start: 2022-09-12 | End: 2022-09-19 | Stop reason: HOSPADM

## 2022-09-12 RX ADMIN — MIDAZOLAM 2 MG: 1 INJECTION INTRAMUSCULAR; INTRAVENOUS at 07:25

## 2022-09-12 RX ADMIN — ROCURONIUM BROMIDE 10 MG: 10 INJECTION, SOLUTION INTRAVENOUS at 09:55

## 2022-09-12 RX ADMIN — HYDROMORPHONE HYDROCHLORIDE 0.5 MG: 1 INJECTION, SOLUTION INTRAMUSCULAR; INTRAVENOUS; SUBCUTANEOUS at 14:58

## 2022-09-12 RX ADMIN — DROPERIDOL 0.62 MG: 2.5 INJECTION, SOLUTION INTRAMUSCULAR; INTRAVENOUS at 07:25

## 2022-09-12 RX ADMIN — SODIUM CHLORIDE, SODIUM LACTATE, POTASSIUM CHLORIDE, AND CALCIUM CHLORIDE 1000 ML: 600; 310; 30; 20 INJECTION, SOLUTION INTRAVENOUS at 17:42

## 2022-09-12 RX ADMIN — SODIUM CHLORIDE, SODIUM LACTATE, POTASSIUM CHLORIDE, AND CALCIUM CHLORIDE: 600; 310; 30; 20 INJECTION, SOLUTION INTRAVENOUS at 08:31

## 2022-09-12 RX ADMIN — DEXMEDETOMIDINE HYDROCHLORIDE 4 MCG: 100 INJECTION, SOLUTION INTRAVENOUS at 08:11

## 2022-09-12 RX ADMIN — Medication 20 MG: at 11:20

## 2022-09-12 RX ADMIN — FENTANYL CITRATE: 50 INJECTION, SOLUTION INTRAMUSCULAR; INTRAVENOUS at 16:26

## 2022-09-12 RX ADMIN — SODIUM CHLORIDE, SODIUM LACTATE, POTASSIUM CHLORIDE, AND CALCIUM CHLORIDE 125 ML/HR: 600; 310; 30; 20 INJECTION, SOLUTION INTRAVENOUS at 19:52

## 2022-09-12 RX ADMIN — Medication 200 MCG: at 11:11

## 2022-09-12 RX ADMIN — ROCURONIUM BROMIDE 10 MG: 10 INJECTION, SOLUTION INTRAVENOUS at 13:30

## 2022-09-12 RX ADMIN — PROPOFOL 160 MG: 10 INJECTION, EMULSION INTRAVENOUS at 07:34

## 2022-09-12 RX ADMIN — CALCIUM GLUCONATE 1000 MG: 20 INJECTION, SOLUTION INTRAVENOUS at 22:26

## 2022-09-12 RX ADMIN — INSULIN LISPRO 2 UNITS: 100 INJECTION, SOLUTION INTRAVENOUS; SUBCUTANEOUS at 23:41

## 2022-09-12 RX ADMIN — Medication 100 MCG: at 10:30

## 2022-09-12 RX ADMIN — Medication 20 MG: at 11:10

## 2022-09-12 RX ADMIN — SODIUM CHLORIDE, SODIUM LACTATE, POTASSIUM CHLORIDE, AND CALCIUM CHLORIDE 500 ML: 600; 310; 30; 20 INJECTION, SOLUTION INTRAVENOUS at 21:50

## 2022-09-12 RX ADMIN — KETAMINE HYDROCHLORIDE 10 MG: 10 INJECTION, SOLUTION INTRAMUSCULAR; INTRAVENOUS at 07:59

## 2022-09-12 RX ADMIN — NOREPINEPHRINE BITARTRATE 4 MCG/MIN: 8 INJECTION, SOLUTION INTRAVENOUS at 19:30

## 2022-09-12 RX ADMIN — SODIUM CHLORIDE, PRESERVATIVE FREE 10 ML: 5 INJECTION INTRAVENOUS at 21:54

## 2022-09-12 RX ADMIN — ROCURONIUM BROMIDE 20 MG: 10 INJECTION, SOLUTION INTRAVENOUS at 11:21

## 2022-09-12 RX ADMIN — ALBUMIN (HUMAN) 25 G: 0.25 INJECTION, SOLUTION INTRAVENOUS at 09:25

## 2022-09-12 RX ADMIN — ONDANSETRON HYDROCHLORIDE 4 MG: 2 INJECTION INTRAMUSCULAR; INTRAVENOUS at 14:34

## 2022-09-12 RX ADMIN — Medication 20 MG: at 12:50

## 2022-09-12 RX ADMIN — ROCURONIUM BROMIDE 10 MG: 10 INJECTION, SOLUTION INTRAVENOUS at 12:48

## 2022-09-12 RX ADMIN — HYDROMORPHONE HYDROCHLORIDE 1 MG: 2 INJECTION, SOLUTION INTRAMUSCULAR; INTRAVENOUS; SUBCUTANEOUS at 08:07

## 2022-09-12 RX ADMIN — KETOROLAC TROMETHAMINE 15 MG: 30 INJECTION, SOLUTION INTRAMUSCULAR at 16:00

## 2022-09-12 RX ADMIN — Medication 200 MCG: at 13:11

## 2022-09-12 RX ADMIN — SODIUM CHLORIDE, SODIUM LACTATE, POTASSIUM CHLORIDE, AND CALCIUM CHLORIDE 125 ML/HR: 600; 310; 30; 20 INJECTION, SOLUTION INTRAVENOUS at 06:22

## 2022-09-12 RX ADMIN — ALBUMIN (HUMAN) 25 G: 0.25 INJECTION, SOLUTION INTRAVENOUS at 10:09

## 2022-09-12 RX ADMIN — Medication 200 MCG: at 11:12

## 2022-09-12 RX ADMIN — DEXAMETHASONE SODIUM PHOSPHATE 8 MG: 4 INJECTION, SOLUTION INTRAMUSCULAR; INTRAVENOUS at 07:53

## 2022-09-12 RX ADMIN — Medication 100 MCG: at 09:09

## 2022-09-12 RX ADMIN — KETAMINE HYDROCHLORIDE 10 MG: 10 INJECTION, SOLUTION INTRAMUSCULAR; INTRAVENOUS at 10:24

## 2022-09-12 RX ADMIN — Medication 20 MG: at 12:34

## 2022-09-12 RX ADMIN — CEFAZOLIN 2 G: 10 INJECTION, POWDER, FOR SOLUTION INTRAVENOUS at 19:55

## 2022-09-12 RX ADMIN — ROCURONIUM BROMIDE 10 MG: 10 INJECTION, SOLUTION INTRAVENOUS at 10:26

## 2022-09-12 RX ADMIN — Medication 20 MG: at 10:55

## 2022-09-12 RX ADMIN — ONDANSETRON 4 MG: 2 INJECTION INTRAMUSCULAR; INTRAVENOUS at 19:44

## 2022-09-12 RX ADMIN — Medication 20 MG: at 11:08

## 2022-09-12 RX ADMIN — KETAMINE HYDROCHLORIDE 10 MG: 10 INJECTION, SOLUTION INTRAMUSCULAR; INTRAVENOUS at 09:06

## 2022-09-12 RX ADMIN — HYDROMORPHONE HYDROCHLORIDE 0.5 MG: 1 INJECTION, SOLUTION INTRAMUSCULAR; INTRAVENOUS; SUBCUTANEOUS at 15:20

## 2022-09-12 RX ADMIN — CLONAZEPAM 0.5 MG: 0.5 TABLET ORAL at 20:08

## 2022-09-12 RX ADMIN — KETAMINE HYDROCHLORIDE 20 MG: 10 INJECTION, SOLUTION INTRAMUSCULAR; INTRAVENOUS at 07:51

## 2022-09-12 RX ADMIN — Medication 2 G: at 10:54

## 2022-09-12 RX ADMIN — Medication 200 MCG: at 10:57

## 2022-09-12 RX ADMIN — Medication 20 MG: at 10:31

## 2022-09-12 RX ADMIN — Medication 200 MCG: at 12:46

## 2022-09-12 RX ADMIN — NORTRIPTYLINE HYDROCHLORIDE 10 MG: 10 CAPSULE ORAL at 21:49

## 2022-09-12 RX ADMIN — SODIUM CHLORIDE, PRESERVATIVE FREE 10 ML: 5 INJECTION INTRAVENOUS at 22:25

## 2022-09-12 RX ADMIN — Medication 20 MG: at 09:50

## 2022-09-12 RX ADMIN — Medication 200 MCG: at 09:11

## 2022-09-12 RX ADMIN — DEXMEDETOMIDINE HYDROCHLORIDE 4 MCG: 100 INJECTION, SOLUTION INTRAVENOUS at 08:01

## 2022-09-12 RX ADMIN — Medication 200 MCG: at 11:01

## 2022-09-12 RX ADMIN — FENTANYL CITRATE 100 MCG: 50 INJECTION, SOLUTION INTRAMUSCULAR; INTRAVENOUS at 07:33

## 2022-09-12 RX ADMIN — FENTANYL CITRATE: 50 INJECTION, SOLUTION INTRAMUSCULAR; INTRAVENOUS at 19:31

## 2022-09-12 RX ADMIN — Medication 10 MG: at 09:45

## 2022-09-12 RX ADMIN — ROCURONIUM BROMIDE 20 MG: 10 INJECTION, SOLUTION INTRAVENOUS at 08:17

## 2022-09-12 RX ADMIN — VANCOMYCIN HYDROCHLORIDE 2000 MG: 10 INJECTION, POWDER, LYOPHILIZED, FOR SOLUTION INTRAVENOUS at 21:51

## 2022-09-12 RX ADMIN — ROCURONIUM BROMIDE 50 MG: 10 INJECTION, SOLUTION INTRAVENOUS at 07:34

## 2022-09-12 RX ADMIN — Medication 200 MCG: at 11:15

## 2022-09-12 RX ADMIN — SODIUM CHLORIDE, SODIUM LACTATE, POTASSIUM CHLORIDE, AND CALCIUM CHLORIDE 125 ML/HR: 600; 310; 30; 20 INJECTION, SOLUTION INTRAVENOUS at 19:43

## 2022-09-12 RX ADMIN — HYDROMORPHONE HYDROCHLORIDE 0.5 MG: 1 INJECTION, SOLUTION INTRAMUSCULAR; INTRAVENOUS; SUBCUTANEOUS at 15:10

## 2022-09-12 RX ADMIN — PHENYLEPHRINE HYDROCHLORIDE 100 MCG/MIN: 10 INJECTION INTRAVENOUS at 10:50

## 2022-09-12 RX ADMIN — HYDROMORPHONE HYDROCHLORIDE 0.5 MG: 1 INJECTION, SOLUTION INTRAMUSCULAR; INTRAVENOUS; SUBCUTANEOUS at 15:35

## 2022-09-12 RX ADMIN — SODIUM CHLORIDE, SODIUM LACTATE, POTASSIUM CHLORIDE, AND CALCIUM CHLORIDE: 600; 310; 30; 20 INJECTION, SOLUTION INTRAVENOUS at 09:40

## 2022-09-12 RX ADMIN — GLYCOPYRROLATE 0.2 MG: 0.2 INJECTION INTRAMUSCULAR; INTRAVENOUS at 07:25

## 2022-09-12 RX ADMIN — SODIUM CHLORIDE, SODIUM LACTATE, POTASSIUM CHLORIDE, AND CALCIUM CHLORIDE 1000 ML: 600; 310; 30; 20 INJECTION, SOLUTION INTRAVENOUS at 15:00

## 2022-09-12 RX ADMIN — Medication 10 MG: at 10:05

## 2022-09-12 RX ADMIN — Medication 20 MG: at 09:26

## 2022-09-12 RX ADMIN — HYDROCODONE BITARTRATE AND ACETAMINOPHEN 1 TABLET: 10; 325 TABLET ORAL at 21:49

## 2022-09-12 RX ADMIN — Medication 10 MG: at 09:37

## 2022-09-12 RX ADMIN — ALBUMIN (HUMAN) 12.5 G: 0.25 INJECTION, SOLUTION INTRAVENOUS at 19:37

## 2022-09-12 RX ADMIN — DEXMEDETOMIDINE HYDROCHLORIDE 6 MCG: 100 INJECTION, SOLUTION INTRAVENOUS at 07:59

## 2022-09-12 RX ADMIN — LIDOCAINE HYDROCHLORIDE 100 MG: 20 INJECTION, SOLUTION INTRAVENOUS at 07:32

## 2022-09-12 RX ADMIN — Medication 100 MCG: at 09:20

## 2022-09-12 RX ADMIN — SODIUM CHLORIDE: 900 INJECTION, SOLUTION INTRAVENOUS at 12:10

## 2022-09-12 RX ADMIN — Medication 20 MG: at 09:31

## 2022-09-12 RX ADMIN — SUGAMMADEX 200 MG: 100 INJECTION, SOLUTION INTRAVENOUS at 14:19

## 2022-09-12 RX ADMIN — Medication 200 MCG: at 11:05

## 2022-09-12 RX ADMIN — ALBUMIN (HUMAN) 12.5 G: 0.25 INJECTION, SOLUTION INTRAVENOUS at 23:42

## 2022-09-12 RX ADMIN — HYDROMORPHONE HYDROCHLORIDE 1 MG: 1 INJECTION, SOLUTION INTRAMUSCULAR; INTRAVENOUS; SUBCUTANEOUS at 20:29

## 2022-09-12 RX ADMIN — PIPERACILLIN AND TAZOBACTAM 3.38 G: 3; .375 INJECTION, POWDER, FOR SOLUTION INTRAVENOUS at 20:25

## 2022-09-12 NOTE — H&P
Date of Surgery Update:  Amparo Lopez was seen and examined. History and physical has been reviewed. The patient has been examined.  There have been no significant clinical changes since the completion of the originally dated History and Physical.    Signed By: William Mcintyre MD     September 12, 2022 7:24 AM

## 2022-09-12 NOTE — PERIOP NOTES
TRANSFER - OUT REPORT:    Verbal report given to Orin Forrester RN(name) on Eli Frey  being transferred to [facility](unit) for routine post - op       Report consisted of patients Situation, Background, Assessment and   Recommendations(SBAR). Information from the following report(s) SBAR, Kardex, OR Summary, Procedure Summary, Intake/Output, and MAR was reviewed with the receiving nurse. Lines:   Peripheral IV 09/12/22 Left;Posterior Hand (Active)   Site Assessment Clean, dry, & intact 09/12/22 1443   Phlebitis Assessment 0 09/12/22 1443   Infiltration Assessment 0 09/12/22 1443   Dressing Status Clean, dry, & intact 09/12/22 1443   Dressing Type Transparent;Tape 09/12/22 1443   Hub Color/Line Status Pink; Infusing 09/12/22 1443       Peripheral IV 09/12/22 Right Hand (Active)   Site Assessment Clean, dry, & intact 09/12/22 1443   Phlebitis Assessment 0 09/12/22 1443   Infiltration Assessment 0 09/12/22 1443   Dressing Status Clean, dry, & intact 09/12/22 1443   Dressing Type Transparent;Tape 09/12/22 1443   Hub Color/Line Status Green; Infusing 09/12/22 1443        Opportunity for questions and clarification was provided.       Patient transported with:   O2 @ 2 liters  Registered Nurse

## 2022-09-12 NOTE — PERIOP NOTES
PCA Fentanyl is being set up. Patient is slightly drowsy but orientedX4, states her pain \" is tolerable\". Due to low BP and drowsiness PCA set to PCA only settings, will notify Dr Elsie Costa.

## 2022-09-12 NOTE — ANESTHESIA POSTPROCEDURE EVALUATION
Post-Anesthesia Evaluation & Assessment    Visit Vitals  /61   Pulse (!) 101   Temp 36.2 °C (97.2 °F)   Resp 17   Ht 5' 4\" (1.626 m)   Wt 100.2 kg (220 lb 12.8 oz)   SpO2 100%   BMI 37.90 kg/m²       Nausea/Vomiting: Controlled. Post-operative hydration adequate. Pain Scale 1: FLACC (09/12/22 1715)  Pain Intensity 1: 0 (09/12/22 1715)   Managed    Pain score at or below stated goal level. Mental status & Level of consciousness: alert and oriented x 3    Neurological status: moves all extremities, sensation grossly intact    Pulmonary status: airway patent, adequate oxygenation. Complications related to anesthesia: none    Patient has met all PACU discharge requirements.       Henrry Alex DO

## 2022-09-12 NOTE — PERIOP NOTES
TRANSFER - IN REPORT:    Verbal report received from Dr Evita GUTIERREZ(name) on Kartik Calender  being received from OR(unit) for routine progression of care      Report consisted of patients Situation, Background, Assessment and   Recommendations(SBAR). Information from the following report(s) SBAR, OR Summary, Procedure Summary, Intake/Output, MAR, and Recent Results was reviewed with the receiving nurse. Opportunity for questions and clarification was provided. Assessment completed upon patients arrival to unit and care assumed.

## 2022-09-12 NOTE — BRIEF OP NOTE
Brief Postoperative Note    Patient: Lianet Richey  YOB: 1975  MRN: [id number]    Date of Procedure: 9/12/2022     Pre-Op Diagnosis:  right ureteral transection,   midline bladder injury    Post-Op Diagnosis: Same as preoperative diagnosis. Procedure(s):  TOTAL ABDOMINAL HYSTERECTOMY,    Surgeon(s):  MD Merlin Mcdowell MD Marine Irving, MD Auther Hoof, MD    Surgical Assistant: Surg Asst-1: Christopher Olegt    Anesthesia: General     Estimated Blood Loss (mL): Minimal during my part of the surgery. ?very large utet    Complications: None    Specimens:   ID Type Source Tests Collected by Time Destination   1 : Uterus and Cervix Preservative Uterus  Kb Gr MD 9/12/2022 1221 Pathology   2 :      9/12/2022 1315 Pathology   1 : Blood for H and H Blood   Kb Gr MD 9/12/2022 1005 Other (See Notes)   2 : Blood for H and H Blood   Kb Gr MD 9/12/2022 1316 Other (See Notes)        Implants:   Implant Name Type Inv. Item Serial No.  Lot No. LRB No. Used Action   SET STENT URTRL 6FR MILAGROS 22CML PRCFLX DBLE PGTL STRGHT TIP DU - [id number]  SET STENT URTRL 6FR MILAGROS 22CML PRCFLX DBLE PGTL STRGHT TIP DU  [employer]_ [id number] Right 1 Implanted       Drains:   Ameya-Stewart Drain 09/12/22 Right Abdomen (Active)       Orogastric Tube 09/12/22 (Active)       Findings: Very large uterus had already been removed. The right ureter had been transected. Midline bladder injury large. A psoas hitch was employed and the ureter was reimplanted under no tension.     Electronically Signed by Kathlyn Severe, MD on 9/12/2022 at 2:00 PM

## 2022-09-12 NOTE — PERIOP NOTES
TRANSFER - OUT REPORT:    Verbal report given to Caprice RN(name) on Marco Antonio Lyons  being transferred to ICU(unit) for routine progression of care       Report consisted of patients Situation, Background, Assessment and   Recommendations(SBAR). Information from the following report(s) SBAR, OR Summary, Procedure Summary, Intake/Output, MAR, Recent Results, and Cardiac Rhythm 22222  was reviewed with the receiving nurse. Lines:   Peripheral IV 09/12/22 Left;Posterior Hand (Active)   Site Assessment Clean, dry, & intact 09/12/22 1443   Phlebitis Assessment 0 09/12/22 1443   Infiltration Assessment 0 09/12/22 1443   Dressing Status Clean, dry, & intact 09/12/22 1443   Dressing Type Transparent;Tape 09/12/22 1443   Hub Color/Line Status Pink; Infusing 09/12/22 1443       Peripheral IV 09/12/22 Right Hand (Active)   Site Assessment Clean, dry, & intact 09/12/22 1443   Phlebitis Assessment 0 09/12/22 1443   Infiltration Assessment 0 09/12/22 1443   Dressing Status Clean, dry, & intact 09/12/22 1443   Dressing Type Transparent;Tape 09/12/22 1443   Hub Color/Line Status Green; Infusing 09/12/22 1443        Opportunity for questions and clarification was provided.       Patient transported with:   Monitor  O2 @ 2 liters  Registered Nurse

## 2022-09-12 NOTE — PERIOP NOTES
H/H, BP, I&O, patient's status is reported to Dr Tommie Rios over phone as well as PCA settings. New orders received, patient is to be admitted to ICU. Family notified by [employer].

## 2022-09-12 NOTE — BRIEF OP NOTE
Brief Postoperative Note    Patient: Felipe Wellington  YOB: 1975  MRN: [id number]    Date of Procedure: 9/12/2022     Pre-Op Diagnosis: UTERINE LEIOMYOMA, PAIN IN PELVIS    Post-Op Diagnosis: Same as preoperative diagnosis. / severe pelvic adhesions    Procedure(s):  TOTAL ABDOMINAL HYSTERECTOMY, RIGHT URETERAL NEOCYSTOSTOMY WITH PSOAS HITCH WITH REPAIR OF BLADDER INJURY/ lysis of adhesions    Surgeon(s):  MD Shannan Clark MD Bentley Saran, MD Heinz Bongo, MD    Surgical Assistant: Surg Asst-1: Cinthya Barnett    Anesthesia: General     Estimated Blood Loss (mL): 3283    Complications: Bleeding, Hypotension, and Unplanned bladder injury    Specimens:   ID Type Source Tests Collected by Time Destination   1 : Uterus and Cervix Preservative Uterus  Gavin Linton MD 9/12/2022 1221 Pathology   1 : Blood for H and H Blood   Gavin Linton MD 9/12/2022 1005 Other (See Notes)        Implants:   Implant Name Type Inv. Item Serial No.  Lot No. LRB No. Used Action   SET STENT URTRL 6FR MILAGROS 22CML PRCFLX DBLE PGTL STRGHT TIP DU - [id number]  SET STENT URTRL 6FR MILAGROS 22CML PRCFLX DBLE PGTL STRGHT TIP DU  [employer]_ [id number] Right 1 Implanted       Drains:   Ameya-Stewart Drain 09/12/22 Right Abdomen (Active)       Orogastric Tube 09/12/22 (Active)       Findings: large fibroid uterus side to side entire pelvis. Severe adhesions of bladder to ant abd wall, uterus and cds, side to side 24 inchs in width .  Normal adherent left ovary, inadvertent cytotomy with ureteral resection    Electronically Signed by Lj Lux MD on 9/12/2022 at 2:15 PM

## 2022-09-12 NOTE — ANESTHESIA PREPROCEDURE EVALUATION
Relevant Problems   RESPIRATORY SYSTEM   (+) SOB (shortness of breath)      NEUROLOGY   (+) Depressive disorder, not elsewhere classified   (+) KATI (generalized anxiety disorder)       Anesthetic History     PONV          Review of Systems / Medical History  Patient summary reviewed, nursing notes reviewed and pertinent labs reviewed    Pulmonary  Within defined limits                 Neuro/Psych         Psychiatric history     Cardiovascular                  Exercise tolerance: >4 METS     GI/Hepatic/Renal     GERD: well controlled           Endo/Other      Hypothyroidism  Obesity and arthritis     Other Findings              Physical Exam    Airway  Mallampati: III  TM Distance: 4 - 6 cm  Neck ROM: normal range of motion   Mouth opening: Diminished (comment)     Cardiovascular               Dental  No notable dental hx       Pulmonary                 Abdominal         Other Findings            Anesthetic Plan    ASA: 2  Anesthesia type: general            Anesthetic plan and risks discussed with: Patient      GA vs regional  discussed w/ pt including risks/benefits.  ?'s answered.  Pt requested SAB but Dr. Liz Lux requests GA; pt accepts.

## 2022-09-12 NOTE — PROGRESS NOTES
Patient arrived to ICU. Alert and Oriented. C/o nausea. Lungs clear to ascultation. Cardiac, Sinus Tachycardia, /63. Patient on 2L NC, pulses palpable, abdomen soft and intact with hypoactive bowel sounds, fingers cold to the touch. Patient states \"my left hand feels numb. \" PCA fentanyl pump verified with PACU RN, José Cade. 100mls of bloody urine emptied from burnette. 1900 bedside shift report given to on-coming night shift RN.

## 2022-09-12 NOTE — PERIOP NOTES
Reviewed PTA medication list with patient/caregiver and patient/caregiver denies any additional medications. Patient admits to having a responsible adult care for them at home for at least 24 hours after surgery. Patient encouraged to use gown warming system and informed that using said warming gown to regulate body temperature prior to a procedure has been shown to help reduce the risks of blood clots and infection. Patient's pharmacy of choice verified and documented in PTA medication section. Dual skin assessment & fall risk band verification completed with Luz Marina Mayen RN.

## 2022-09-12 NOTE — PERIOP NOTES
Patient is transferred to room 10 ICU via bed, Caprice RN at bedside, dual skin assessment completed, all questions answered, patient is stable, no bleeding. Blood pressure 102/63, pulse (!) 106, temperature 98.2 °F (36.8 °C), resp. rate 12, height 5' 4\" (1.626 m), weight 100.2 kg (220 lb 12.8 oz), last menstrual period 07/11/2022, SpO2 100 %.

## 2022-09-12 NOTE — PERIOP NOTES
Bladder scan performed at bedside, no urine detected in patient's bladder, confirmed with Washington Reagin.

## 2022-09-12 NOTE — PERIOP NOTES
[facility] made aware that SBAR is ready for review. Patient assigned room #202.  Alexis Barahona RN will be the nurse

## 2022-09-12 NOTE — PERIOP NOTES
Per Dr. Daniel Bone request patient family has been updated about delay in procedure, patient family currently in surgical waiting area.

## 2022-09-13 ENCOUNTER — APPOINTMENT (OUTPATIENT)
Dept: ULTRASOUND IMAGING | Age: 47
DRG: 742 | End: 2022-09-13
Attending: INTERNAL MEDICINE
Payer: COMMERCIAL

## 2022-09-13 ENCOUNTER — APPOINTMENT (OUTPATIENT)
Dept: CT IMAGING | Age: 47
DRG: 742 | End: 2022-09-13
Attending: INTERNAL MEDICINE
Payer: COMMERCIAL

## 2022-09-13 ENCOUNTER — HOSPITAL ENCOUNTER (INPATIENT)
Dept: ULTRASOUND IMAGING | Age: 47
Discharge: HOME | DRG: 742 | End: 2022-09-13
Attending: INTERNAL MEDICINE
Payer: COMMERCIAL

## 2022-09-13 ENCOUNTER — APPOINTMENT (OUTPATIENT)
Dept: NON INVASIVE DIAGNOSTICS | Age: 47
DRG: 742 | End: 2022-09-13
Attending: INTERNAL MEDICINE
Payer: COMMERCIAL

## 2022-09-13 LAB
ALBUMIN SERPL-MCNC: 2.6 G/DL (ref 3.4–5)
ALBUMIN/GLOB SERPL: 1.4 {RATIO} (ref 0.8–1.7)
ALP SERPL-CCNC: 37 U/L (ref 45–117)
ALT SERPL-CCNC: 58 U/L (ref 13–56)
ANION GAP SERPL CALC-SCNC: 8 MMOL/L (ref 3–18)
APTT PPP: 27.1 SEC (ref 23–36.4)
AST SERPL-CCNC: 65 U/L (ref 10–38)
BASOPHILS # BLD: 0 K/UL (ref 0–0.1)
BASOPHILS NFR BLD: 0 % (ref 0–2)
BILIRUB SERPL-MCNC: 0.7 MG/DL (ref 0.2–1)
BUN SERPL-MCNC: 15 MG/DL (ref 7–18)
BUN/CREAT SERPL: 15 (ref 12–20)
CA-I SERPL-SCNC: 1 MMOL/L (ref 1.12–1.32)
CA-I SERPL-SCNC: 1.09 MMOL/L (ref 1.12–1.32)
CA-I SERPL-SCNC: 1.15 MMOL/L (ref 1.12–1.32)
CALCIUM SERPL-MCNC: 7.1 MG/DL (ref 8.5–10.1)
CHLORIDE SERPL-SCNC: 110 MMOL/L (ref 100–111)
CO2 SERPL-SCNC: 20 MMOL/L (ref 21–32)
CREAT SERPL-MCNC: 0.98 MG/DL (ref 0.6–1.3)
DIFFERENTIAL METHOD BLD: ABNORMAL
ECHO AO ASC DIAM: 2.7 CM
ECHO AO ASCENDING AORTA INDEX: 1.32 CM/M2
ECHO AO ROOT DIAM: 3.2 CM
ECHO AO ROOT INDEX: 1.57 CM/M2
ECHO AV AREA PEAK VELOCITY: 2.7 CM2
ECHO AV AREA VTI: 2.5 CM2
ECHO AV AREA/BSA PEAK VELOCITY: 1.3 CM2/M2
ECHO AV AREA/BSA VTI: 1.2 CM2/M2
ECHO AV MEAN GRADIENT: 7 MMHG
ECHO AV MEAN VELOCITY: 1.2 M/S
ECHO AV PEAK GRADIENT: 11 MMHG
ECHO AV PEAK VELOCITY: 1.7 M/S
ECHO AV VELOCITY RATIO: 0.88
ECHO AV VTI: 32.3 CM
ECHO EST RA PRESSURE: 3 MMHG
ECHO IVC PROX: 1.8 CM
ECHO LA DIAMETER INDEX: 1.32 CM/M2
ECHO LA DIAMETER: 2.7 CM
ECHO LA TO AORTIC ROOT RATIO: 0.84
ECHO LA VOL 4C: 47 ML (ref 22–52)
ECHO LA VOLUME INDEX A4C: 23 ML/M2 (ref 16–34)
ECHO LV E' LATERAL VELOCITY: 16 CM/S
ECHO LV E' SEPTAL VELOCITY: 15 CM/S
ECHO LV EDV A4C: 75 ML
ECHO LV EDV INDEX A4C: 37 ML/M2
ECHO LV EJECTION FRACTION A4C: 70 %
ECHO LV ESV A4C: 23 ML
ECHO LV ESV INDEX A4C: 11 ML/M2
ECHO LV FRACTIONAL SHORTENING: 36 % (ref 28–44)
ECHO LV INTERNAL DIMENSION DIASTOLE INDEX: 2.3 CM/M2
ECHO LV INTERNAL DIMENSION DIASTOLIC: 4.7 CM (ref 3.9–5.3)
ECHO LV INTERNAL DIMENSION SYSTOLIC INDEX: 1.47 CM/M2
ECHO LV INTERNAL DIMENSION SYSTOLIC: 3 CM
ECHO LV IVSD: 0.7 CM (ref 0.6–0.9)
ECHO LV MASS 2D: 103.1 G (ref 67–162)
ECHO LV MASS INDEX 2D: 50.5 G/M2 (ref 43–95)
ECHO LV POSTERIOR WALL DIASTOLIC: 0.7 CM (ref 0.6–0.9)
ECHO LV RELATIVE WALL THICKNESS RATIO: 0.3
ECHO LVOT AREA: 3.1 CM2
ECHO LVOT AV VTI INDEX: 0.82
ECHO LVOT DIAM: 2 CM
ECHO LVOT MEAN GRADIENT: 5 MMHG
ECHO LVOT PEAK GRADIENT: 9 MMHG
ECHO LVOT PEAK VELOCITY: 1.5 M/S
ECHO LVOT STROKE VOLUME INDEX: 40.8 ML/M2
ECHO LVOT SV: 83.2 ML
ECHO LVOT VTI: 26.5 CM
ECHO RIGHT VENTRICULAR SYSTOLIC PRESSURE (RVSP): 35 MMHG
ECHO RV FREE WALL PEAK S': 16 CM/S
ECHO RV TAPSE: 2.5 CM (ref 1.7–?)
ECHO TV REGURGITANT MAX VELOCITY: 2.82 M/S
ECHO TV REGURGITANT PEAK GRADIENT: 32 MMHG
EOSINOPHIL # BLD: 0 K/UL (ref 0–0.4)
EOSINOPHIL NFR BLD: 0 % (ref 0–5)
ERYTHROCYTE [DISTWIDTH] IN BLOOD BY AUTOMATED COUNT: 16 % (ref 11.6–14.5)
EST. AVERAGE GLUCOSE BLD GHB EST-MCNC: 114 MG/DL
EST. AVERAGE GLUCOSE BLD GHB EST-MCNC: 120 MG/DL
FIBRINOGEN PPP-MCNC: 372 MG/DL (ref 210–451)
GLOBULIN SER CALC-MCNC: 1.9 G/DL (ref 2–4)
GLUCOSE BLD STRIP.AUTO-MCNC: 116 MG/DL (ref 70–110)
GLUCOSE BLD STRIP.AUTO-MCNC: 133 MG/DL (ref 70–110)
GLUCOSE BLD STRIP.AUTO-MCNC: 141 MG/DL (ref 70–110)
GLUCOSE BLD STRIP.AUTO-MCNC: 147 MG/DL (ref 70–110)
GLUCOSE SERPL-MCNC: 140 MG/DL (ref 74–99)
HBA1C MFR BLD: 5.6 % (ref 4.2–5.6)
HBA1C MFR BLD: 5.8 % (ref 4.2–5.6)
HCT VFR BLD AUTO: 18.7 % (ref 35–45)
HCT VFR BLD AUTO: 20.7 % (ref 35–45)
HCT VFR BLD AUTO: 22.5 % (ref 35–45)
HGB BLD-MCNC: 6.3 G/DL (ref 12–16)
HGB BLD-MCNC: 7 G/DL (ref 12–16)
HGB BLD-MCNC: 7.7 G/DL (ref 12–16)
HISTORY CHECKED?,CKHIST: NORMAL
IMM GRANULOCYTES # BLD AUTO: 0.1 K/UL (ref 0–0.04)
IMM GRANULOCYTES NFR BLD AUTO: 1 % (ref 0–0.5)
INR PPP: 1.2 (ref 0.8–1.2)
LACTATE SERPL-SCNC: 1.7 MMOL/L (ref 0.4–2)
LIPASE SERPL-CCNC: 259 U/L (ref 73–393)
LYMPHOCYTES # BLD: 1.1 K/UL (ref 0.9–3.6)
LYMPHOCYTES NFR BLD: 9 % (ref 21–52)
MAGNESIUM SERPL-MCNC: 1.4 MG/DL (ref 1.6–2.6)
MAGNESIUM SERPL-MCNC: 2.2 MG/DL (ref 1.6–2.6)
MCH RBC QN AUTO: 28.2 PG (ref 24–34)
MCHC RBC AUTO-ENTMCNC: 34.2 G/DL (ref 31–37)
MCV RBC AUTO: 82.4 FL (ref 78–100)
MONOCYTES # BLD: 1.2 K/UL (ref 0.05–1.2)
MONOCYTES NFR BLD: 10 % (ref 3–10)
NEUTS SEG # BLD: 9.6 K/UL (ref 1.8–8)
NEUTS SEG NFR BLD: 80 % (ref 40–73)
NRBC # BLD: 0 K/UL (ref 0–0.01)
NRBC BLD-RTO: 0 PER 100 WBC
PHOSPHATE SERPL-MCNC: 1.6 MG/DL (ref 2.5–4.9)
PHOSPHATE SERPL-MCNC: 2.2 MG/DL (ref 2.5–4.9)
PLATELET # BLD AUTO: 150 K/UL (ref 135–420)
PMV BLD AUTO: 9.7 FL (ref 9.2–11.8)
POTASSIUM SERPL-SCNC: 4.5 MMOL/L (ref 3.5–5.5)
PROT SERPL-MCNC: 4.5 G/DL (ref 6.4–8.2)
PROTHROMBIN TIME: 15.9 SEC (ref 11.5–15.2)
RBC # BLD AUTO: 2.73 M/UL (ref 4.2–5.3)
SODIUM SERPL-SCNC: 138 MMOL/L (ref 136–145)
TSH SERPL DL<=0.05 MIU/L-ACNC: 1.66 UIU/ML (ref 0.36–3.74)
WBC # BLD AUTO: 12 K/UL (ref 4.6–13.2)

## 2022-09-13 PROCEDURE — 93306 TTE W/DOPPLER COMPLETE: CPT

## 2022-09-13 PROCEDURE — 74011000250 HC RX REV CODE- 250: Performed by: INTERNAL MEDICINE

## 2022-09-13 PROCEDURE — 74011250637 HC RX REV CODE- 250/637: Performed by: OBSTETRICS & GYNECOLOGY

## 2022-09-13 PROCEDURE — P9016 RBC LEUKOCYTES REDUCED: HCPCS

## 2022-09-13 PROCEDURE — 30233N1 TRANSFUSION OF NONAUTOLOGOUS RED BLOOD CELLS INTO PERIPHERAL VEIN, PERCUTANEOUS APPROACH: ICD-10-PCS | Performed by: OBSTETRICS & GYNECOLOGY

## 2022-09-13 PROCEDURE — 84100 ASSAY OF PHOSPHORUS: CPT

## 2022-09-13 PROCEDURE — 77010033678 HC OXYGEN DAILY

## 2022-09-13 PROCEDURE — 85025 COMPLETE CBC W/AUTO DIFF WBC: CPT

## 2022-09-13 PROCEDURE — 85384 FIBRINOGEN ACTIVITY: CPT

## 2022-09-13 PROCEDURE — 83036 HEMOGLOBIN GLYCOSYLATED A1C: CPT

## 2022-09-13 PROCEDURE — 76705 ECHO EXAM OF ABDOMEN: CPT

## 2022-09-13 PROCEDURE — 85610 PROTHROMBIN TIME: CPT

## 2022-09-13 PROCEDURE — 83690 ASSAY OF LIPASE: CPT

## 2022-09-13 PROCEDURE — 74011250637 HC RX REV CODE- 250/637: Performed by: INTERNAL MEDICINE

## 2022-09-13 PROCEDURE — 85730 THROMBOPLASTIN TIME PARTIAL: CPT

## 2022-09-13 PROCEDURE — 83605 ASSAY OF LACTIC ACID: CPT

## 2022-09-13 PROCEDURE — 84443 ASSAY THYROID STIM HORMONE: CPT

## 2022-09-13 PROCEDURE — 74011250636 HC RX REV CODE- 250/636: Performed by: INTERNAL MEDICINE

## 2022-09-13 PROCEDURE — 83735 ASSAY OF MAGNESIUM: CPT

## 2022-09-13 PROCEDURE — 65610000006 HC RM INTENSIVE CARE

## 2022-09-13 PROCEDURE — 82962 GLUCOSE BLOOD TEST: CPT

## 2022-09-13 PROCEDURE — P9047 ALBUMIN (HUMAN), 25%, 50ML: HCPCS | Performed by: INTERNAL MEDICINE

## 2022-09-13 PROCEDURE — 76775 US EXAM ABDO BACK WALL LIM: CPT

## 2022-09-13 PROCEDURE — 36430 TRANSFUSION BLD/BLD COMPNT: CPT

## 2022-09-13 PROCEDURE — 82330 ASSAY OF CALCIUM: CPT

## 2022-09-13 PROCEDURE — 74011000258 HC RX REV CODE- 258: Performed by: INTERNAL MEDICINE

## 2022-09-13 PROCEDURE — 85018 HEMOGLOBIN: CPT

## 2022-09-13 PROCEDURE — 80053 COMPREHEN METABOLIC PANEL: CPT

## 2022-09-13 PROCEDURE — 74176 CT ABD & PELVIS W/O CONTRAST: CPT

## 2022-09-13 PROCEDURE — 36415 COLL VENOUS BLD VENIPUNCTURE: CPT

## 2022-09-13 RX ORDER — SODIUM CHLORIDE 9 MG/ML
250 INJECTION, SOLUTION INTRAVENOUS AS NEEDED
Status: DISCONTINUED | OUTPATIENT
Start: 2022-09-13 | End: 2022-09-19 | Stop reason: HOSPADM

## 2022-09-13 RX ORDER — CALCIUM CARB/MAGNESIUM CARB 311-232MG
10 TABLET ORAL
Status: DISCONTINUED | OUTPATIENT
Start: 2022-09-13 | End: 2022-09-19 | Stop reason: HOSPADM

## 2022-09-13 RX ORDER — ONDANSETRON 2 MG/ML
4 INJECTION INTRAMUSCULAR; INTRAVENOUS
Status: DISCONTINUED | OUTPATIENT
Start: 2022-09-13 | End: 2022-09-19 | Stop reason: HOSPADM

## 2022-09-13 RX ORDER — CALCIUM GLUCONATE 20 MG/ML
2 INJECTION, SOLUTION INTRAVENOUS ONCE
Status: COMPLETED | OUTPATIENT
Start: 2022-09-13 | End: 2022-09-13

## 2022-09-13 RX ORDER — MAGNESIUM SULFATE HEPTAHYDRATE 40 MG/ML
2 INJECTION, SOLUTION INTRAVENOUS ONCE
Status: COMPLETED | OUTPATIENT
Start: 2022-09-13 | End: 2022-09-13

## 2022-09-13 RX ORDER — MAGNESIUM SULFATE 1 G/100ML
1 INJECTION INTRAVENOUS ONCE
Status: COMPLETED | OUTPATIENT
Start: 2022-09-13 | End: 2022-09-13

## 2022-09-13 RX ORDER — SODIUM,POTASSIUM PHOSPHATES 280-250MG
2 POWDER IN PACKET (EA) ORAL
Status: COMPLETED | OUTPATIENT
Start: 2022-09-13 | End: 2022-09-13

## 2022-09-13 RX ORDER — LIDOCAINE HYDROCHLORIDE 20 MG/ML
JELLY TOPICAL AS NEEDED
Status: DISCONTINUED | OUTPATIENT
Start: 2022-09-13 | End: 2022-09-19 | Stop reason: HOSPADM

## 2022-09-13 RX ADMIN — ALBUMIN (HUMAN) 12.5 G: 0.25 INJECTION, SOLUTION INTRAVENOUS at 23:05

## 2022-09-13 RX ADMIN — HYDROMORPHONE HYDROCHLORIDE 1 MG: 1 INJECTION, SOLUTION INTRAMUSCULAR; INTRAVENOUS; SUBCUTANEOUS at 09:34

## 2022-09-13 RX ADMIN — ALBUMIN (HUMAN) 12.5 G: 0.25 INJECTION, SOLUTION INTRAVENOUS at 11:54

## 2022-09-13 RX ADMIN — SODIUM PHOSPHATE, MONOBASIC, MONOHYDRATE 6 MMOL: 276; 142 INJECTION, SOLUTION INTRAVENOUS at 21:45

## 2022-09-13 RX ADMIN — ONDANSETRON 4 MG: 2 INJECTION INTRAMUSCULAR; INTRAVENOUS at 03:02

## 2022-09-13 RX ADMIN — Medication: at 09:33

## 2022-09-13 RX ADMIN — ALBUMIN (HUMAN) 12.5 G: 0.25 INJECTION, SOLUTION INTRAVENOUS at 18:17

## 2022-09-13 RX ADMIN — Medication 2 PACKET: at 08:11

## 2022-09-13 RX ADMIN — HYDROMORPHONE HYDROCHLORIDE 1 MG: 1 INJECTION, SOLUTION INTRAMUSCULAR; INTRAVENOUS; SUBCUTANEOUS at 21:46

## 2022-09-13 RX ADMIN — CLONAZEPAM 0.5 MG: 0.5 TABLET ORAL at 13:07

## 2022-09-13 RX ADMIN — HYDROMORPHONE HYDROCHLORIDE 1 MG: 1 INJECTION, SOLUTION INTRAMUSCULAR; INTRAVENOUS; SUBCUTANEOUS at 16:55

## 2022-09-13 RX ADMIN — PIPERACILLIN AND TAZOBACTAM 3.38 G: 3; .375 INJECTION, POWDER, FOR SOLUTION INTRAVENOUS at 11:54

## 2022-09-13 RX ADMIN — NORTRIPTYLINE HYDROCHLORIDE 10 MG: 10 CAPSULE ORAL at 21:13

## 2022-09-13 RX ADMIN — HYDROMORPHONE HYDROCHLORIDE 1 MG: 1 INJECTION, SOLUTION INTRAMUSCULAR; INTRAVENOUS; SUBCUTANEOUS at 13:07

## 2022-09-13 RX ADMIN — CALCIUM GLUCONATE 2 G: 20 INJECTION, SOLUTION INTRAVENOUS at 15:59

## 2022-09-13 RX ADMIN — CALCIUM GLUCONATE 2 G: 20 INJECTION, SOLUTION INTRAVENOUS at 08:10

## 2022-09-13 RX ADMIN — PIPERACILLIN AND TAZOBACTAM 3.38 G: 3; .375 INJECTION, POWDER, FOR SOLUTION INTRAVENOUS at 18:17

## 2022-09-13 RX ADMIN — MAGNESIUM SULFATE HEPTAHYDRATE 2 G: 40 INJECTION, SOLUTION INTRAVENOUS at 08:10

## 2022-09-13 RX ADMIN — Medication: at 20:08

## 2022-09-13 RX ADMIN — PIPERACILLIN AND TAZOBACTAM 3.38 G: 3; .375 INJECTION, POWDER, FOR SOLUTION INTRAVENOUS at 02:11

## 2022-09-13 RX ADMIN — LEVOTHYROXINE SODIUM 50 MCG: 0.05 TABLET ORAL at 05:15

## 2022-09-13 RX ADMIN — SODIUM CHLORIDE, SODIUM LACTATE, POTASSIUM CHLORIDE, AND CALCIUM CHLORIDE 150 ML/HR: 600; 310; 30; 20 INJECTION, SOLUTION INTRAVENOUS at 05:25

## 2022-09-13 RX ADMIN — HYDROCODONE BITARTRATE AND ACETAMINOPHEN 1 TABLET: 10; 325 TABLET ORAL at 08:11

## 2022-09-13 RX ADMIN — HYDROMORPHONE HYDROCHLORIDE 1 MG: 1 INJECTION, SOLUTION INTRAMUSCULAR; INTRAVENOUS; SUBCUTANEOUS at 00:40

## 2022-09-13 RX ADMIN — Medication 2 PACKET: at 09:33

## 2022-09-13 RX ADMIN — HYDROCODONE BITARTRATE AND ACETAMINOPHEN 1 TABLET: 10; 325 TABLET ORAL at 18:17

## 2022-09-13 RX ADMIN — MAGNESIUM SULFATE HEPTAHYDRATE 1 G: 1 INJECTION, SOLUTION INTRAVENOUS at 09:33

## 2022-09-13 RX ADMIN — CLONAZEPAM 0.5 MG: 0.5 TABLET ORAL at 08:11

## 2022-09-13 RX ADMIN — HYDROMORPHONE HYDROCHLORIDE 1 MG: 1 INJECTION, SOLUTION INTRAMUSCULAR; INTRAVENOUS; SUBCUTANEOUS at 05:16

## 2022-09-13 RX ADMIN — SODIUM CHLORIDE, PRESERVATIVE FREE 10 ML: 5 INJECTION INTRAVENOUS at 13:07

## 2022-09-13 RX ADMIN — ALBUMIN (HUMAN) 12.5 G: 0.25 INJECTION, SOLUTION INTRAVENOUS at 05:16

## 2022-09-13 RX ADMIN — NOREPINEPHRINE BITARTRATE 2 MCG/MIN: 8 INJECTION, SOLUTION INTRAVENOUS at 21:58

## 2022-09-13 RX ADMIN — HYDROCODONE BITARTRATE AND ACETAMINOPHEN 1 TABLET: 10; 325 TABLET ORAL at 02:10

## 2022-09-13 RX ADMIN — Medication 10 MG: at 22:02

## 2022-09-13 RX ADMIN — VITAM B12 100 MCG: 100 TAB at 09:33

## 2022-09-13 RX ADMIN — CLONAZEPAM 0.5 MG: 0.5 TABLET ORAL at 20:07

## 2022-09-13 RX ADMIN — SODIUM CHLORIDE, PRESERVATIVE FREE 10 ML: 5 INJECTION INTRAVENOUS at 05:15

## 2022-09-13 RX ADMIN — SODIUM CHLORIDE, PRESERVATIVE FREE 10 ML: 5 INJECTION INTRAVENOUS at 21:14

## 2022-09-13 NOTE — ACP (ADVANCE CARE PLANNING)
Advance Care Planning   Advance Care Planning Inpatient Note  [facility] Department    Today's Date: 9/13/2022  [facility]    Received request from rounding. Upon review of chart and communication with care team, patient's decision making abilities are not in question. Patient and Spouse were both present in the room during visit. Goals of ACP Conversation:  Discuss Advance Care planning documents    Health Care Decision Makers:      Primary Decision Maker: Nolberto Castellano - Spouse - [phone]    Summary:  Documented Next of Kin, per patient report    Advance Care Planning Documents (Patient Wishes) on file:  None     Assessment:    Patient's  was at the bedside. She is not interested in completing an Advance Medical Directive and understands he is her legal next of kin. Patient also relies on her brother-in-law as he is the Director of Pharmacy for [employer] [facility] [employer] and can help us \"interpret medical issues. \"  Patient has good support. She is [employer] and has 3 churches praying for her.       Interventions:  Provided education on documents for clarity and greater understanding  Discussed and provided education on state decision maker hierarchy  Encouraged ongoing ACP conversation with future decision makers and loved ones    Care Preferences Communicated:  Mirta Rodas [facility] on 9/13/2022 at 11:47 AM

## 2022-09-13 NOTE — PERIOP NOTES
Dr. Karlo Campbell left the procedure at [phone]  to go to her office and returned approximately at 2 pm.

## 2022-09-13 NOTE — PROGRESS NOTES
Zosyn (Piperacillin/Tazobactam) Extended Infusion    Eugenia Osorio, a 40 y.o. yo female, has been converted to an extended infusion of Zosyn while in the intensive care unit. A loading dose of 3.375 gm will be given over 30 minutes     Extended infusions will begin 6 hours after the initial dose if CrCl  >/= 20 ml/min or 8 hours after the initial dose if CrCl < 20 ml/min. Zosyn 3.375 gm IV q6h over 30 minutes was changed to Zosyn Extended Infusion 3.375 gm IV q8h over 240 minutes   No current labs to check renal function   STAT BMP ordered per MD     No results for input(s): CREA in the last 72 hours.   Ht Readings from Last 1 Encounters:   09/12/22 162.6 cm (64\")     Wt Readings from Last 1 Encounters:   09/12/22 100.2 kg (220 lb 12.8 oz)       CrCl : Creatinine clearance cannot be calculated (Patient's most recent lab result is older than the maximum 180 days allowed.)    Renal adjustment of extended infusion of Zosyn  3.375 or 4.5 gm every 8 hours for CrCl >/= 20 ml/min  3.375 or 4.5 gm every 12 hours for CrCl < 20 ml/min, intermittent HD or PD

## 2022-09-13 NOTE — PROGRESS NOTES
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 the chest.    * * *           Please note: Voice-recognition software may have been used to generate this report, which may have resulted in some phonetic-based errors in grammar and contents. Even though attempts were made to correct all the mistakes, some may have been missed, and remained in the body of the document.       Karen Delarosa MD  9/13/2022

## 2022-09-13 NOTE — PROGRESS NOTES
@5518 pt awake alert oriented x3 on nc/4l. Remains on fentanyl drip. BP low and continues to trend down. RRT was called Dr Erik Ervin came to bedside all orders carried out. Phlebotomist unable to get blood after several attempts other staff members attempted to draw blood. Levophed drip , started, albumin administered ,Fentanyl drip turned off. Assessment carried out and charted in appropriate flow sheets. Nursing management continues. @7166 Dr Corey Joiner came to bedside and assessed pt , Dr Erik Ervin is also present at bedside. Orders carried out care continues. @0726 CT called unable to take pt at this time as attempts being made to draw labs. @2330 Dr Seth Newell came to bedside screened pt for central line and assessed pt, attempt to draw labs again care continues. Hold blood transfusion at this time per MD Seth Newell. @2300 CT was called , unable to take pt to CT as CT is occupied at this time they will call when available. [facility] PICC line services called and willl be here ASAP.    @2325 picc line services is present on unit care continues. @0000 Rios Vo at bedside verbalized that PICC line assessment showed that PICC  line will not be able to be placed care continues. @5416 left for CT on ICU monitor accompanied  by nurse. @9744 pt returned to unit no new changes awaiting CT results. @1388 Dr Erik Ervin present in unit  and was informed of CBC  results . will repeat at scheduled time ,observation continues. @4362 Bedside and Verbal shift change report given to Cindi Alex (oncoming nurse) by Merlene Barahona (offgoing nurse). Report included the following information SBAR, Kardex, Intake/Output, MAR, Recent Results, Med Rec Status, and Alarm Parameters .

## 2022-09-13 NOTE — PROGRESS NOTES
Urology Progress Note    Subjective:     Daily Progress Note: 2022 7:31 AM    Yimi Castellano is having a lot of bladder spasms and subtle movements create problems with irritation. She states that she is prone toward bladder irritability / dysuria at baseline and was particularly susceptible to various foods causing dysuria. She takes Uribel at baseline. Objective:     Visit Vitals  BP (!) 92/48   Pulse (!) 108   Temp 98.6 °F (37 °C)   Resp 9   Ht 5' 4\" (1.626 m)   Wt 100.2 kg (220 lb 12.8 oz)   LMP 2022   SpO2 97%   BMI 37.90 kg/m²        Temp (24hrs), Av °F (36.7 °C), Min:97.2 °F (36.2 °C), Max:98.6 °F (37 °C)      Intake and Output:   1901 -  0700  In: 5377 [I.V.:6500]  Out: 8694 [MXAVU:3361; Drains:175]  No intake/output data recorded.     Physical Exam:   Gen - NAD, asleep but arroused easily and was pleasant and conversational  Abd - soft, mild distention    UOP - clear    Lab/Data Review:  CMP:   Lab Results   Component Value Date/Time     2022 05:20 AM    K 4.5 2022 05:20 AM     2022 05:20 AM    CO2 20 (L) 2022 05:20 AM    AGAP 8 2022 05:20 AM     (H) 2022 05:20 AM    BUN 15 2022 05:20 AM    CREA 0.98 2022 05:20 AM    GFRAA >60 2022 05:20 AM    GFRNA >60 2022 05:20 AM    CA 7.1 (L) 2022 05:20 AM    MG 1.4 (L) 2022 05:20 AM    PHOS 2.2 (L) 2022 05:20 AM    ALB 2.6 (L) 2022 05:20 AM    TP 4.5 (L) 2022 05:20 AM    GLOB 1.9 (L) 2022 05:20 AM    AGRAT 1.4 2022 05:20 AM    ALT 58 (H) 2022 05:20 AM     CBC:   Lab Results   Component Value Date/Time    WBC 12.0 2022 05:20 AM    HGB 7.7 (L) 2022 05:20 AM    HCT 22.5 (L) 2022 05:20 AM     2022 05:20 AM     COAGS:   Lab Results   Component Value Date/Time    APTT 24.6 2022 10:15 PM    PTP 15.9 (H) 2022 10:15 PM    INR 1.2 2022 10:15 PM       CT - stent in good position within kidney and at dome of bladder    Assessment/Plan:     Principal Problem:    S/P abdominal hysterectomy (9/12/2022)    Active Problems:    Fibroid uterus (9/12/2022)      Hypovolemic shock ([address] (9/12/2022)      Sinus tachycardia (9/12/2022)      Bladder injury (9/12/2022)      Acute renal failure (ARF) ([address] (9/12/2022)      Hematuria (9/12/2022)      Poor intravenous access (9/12/2022)      She is doing okay. She is intravascularly depleted secondary to acute blood loss anemia and third-spacing. She is getting corrected and urine output is good and she is being weaned from pressors.       Plan:      Continue burnette for at least 3-4 weeks -- we will do cystogram as outpatient to determine if it can be removed    Stent for 2 months
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
## 2022-09-13 NOTE — PROGRESS NOTES
Subjective:     Admission Date: 9/12/2022     Complaint:  Called to see patient due to hypotension after transfer to ICU. Pt with complicated prolonged surgery today. She had EBL > 2000 mL and was transfused 3 U PRBC. Upon transfer and in ICU she became hypotensive and a RRT was called. The patient stabilized with Levaphed and fluid resuscitation. At this time she c/o urethral pain. Mild nausea. No SOB/ No CP. She is alert and oriented.      Current Facility-Administered Medications   Medication Dose Route Frequency    lactated Ringers infusion  125 mL/hr IntraVENous CONTINUOUS    lactated Ringers infusion  125 mL/hr IntraVENous CONTINUOUS    acetaminophen (TYLENOL) tablet 650 mg  650 mg Oral Q4H PRN    HYDROcodone-acetaminophen (NORCO)  mg tablet 1 Tablet  1 Tablet Oral Q4H PRN    HYDROmorphone (DILAUDID) injection 1 mg  1 mg IntraVENous Q4H PRN    fentaNYL (PF)  mcg/30 ml   IntraVENous TITRATE    clonazePAM (KlonoPIN) tablet 0.5 mg  0.5 mg Oral TID    cyanocobalamin (VITAMIN B12) tablet 100 mcg  100 mcg Oral DAILY    levothyroxine (SYNTHROID) tablet 50 mcg  50 mcg Oral 6am    nortriptyline (PAMELOR) capsule 10 mg  10 mg Oral QHS    0.9% sodium chloride infusion 250 mL  250 mL IntraVENous PRN    ceFAZolin (ANCEF) 2 g/20 mL in sterile water IV syringe  2 g IntraVENous Q8H    HYDROmorphone (PF) (DILAUDID) 1 mg/mL injection        NOREPINephrine (LEVOPHED) 8 mg/250 mL (32 mcg/mL) in dextrose 5% 250 mL (32 mcg/mL) infusion        NOREPINephrine (LEVOPHED) 8 mg in 5% dextrose 250mL (32 mcg/mL) infusion  4-16 mcg/min IntraVENous TITRATE    piperacillin-tazobactam (ZOSYN) 3.375 g in 0.9% sodium chloride (MBP/ADV) 100 mL MBP  3.375 g IntraVENous Q8H    vancomycin (VANCOCIN) 1,000 mg in 0.9% sodium chloride 250 mL (VIAL-MATE)  1,000 mg IntraVENous Q12H    piperacillin-tazobactam (ZOSYN) 3.375 g in 0.9% sodium chloride (MBP/ADV) 100 mL MBP  3.375 g IntraVENous ONCE       Review of Systems:  A comprehensive review of systems was negative except for that written in the HPI. Objective:     Visit Vitals  BP (!) 75/54   Pulse (!) 110   Temp 98.2 °F (36.8 °C)   Resp 13   Ht 5' 4\" (1.626 m)   Wt 100.2 kg (220 lb 12.8 oz)   LMP 07/11/2022   SpO2 97%   BMI 37.90 kg/m²       Intake and Output:   09/11 0701 - 09/12 1900  In: 9904 [I.V.:6500]  Out: 1545 [Urine:675; Drains:70]  No intake/output data recorded. Chest tube IN:    Chest tube OUT    NG Tube IN:    NG Tube OUT:    Urine void OUT:    Urine cath OUT: Urinary Catheter 09/12/22 2- way-Urine Output (mL): 100 ml (09/12/22 1900)  IV IN:  I.V.: 800 mL (09/12/22 1700)  TPN IN:    Feeding tube IN:      Physical Exam:   Alert and oriented x 4  CV - tachycardic, regular rhythm  Pulm - CTA B  Abd - soft, appropriately ttp, hypoactive BS  Incision - C/D/I  Drains - HOMER with 50 mL serosanguineous drainage; Wilkerson was bloody urine  Extr - scd on; 2+ edema    Data Review:     Recent Results (from the past 24 hour(s))   HCG URINE, QL. - POC    Collection Time: 09/12/22  6:15 AM   Result Value Ref Range    Pregnancy test,urine (POC) Negative NEG     RBC, ALLOCATE    Collection Time: 09/12/22  9:45 AM   Result Value Ref Range    HISTORY CHECKED? Historical check performed    HGB & HCT    Collection Time: 09/12/22 10:30 AM   Result Value Ref Range    HGB 6.0 (L) 12.0 - 16.0 g/dL    HCT 18.6 (L) 35.0 - 45.0 %   RBC, ALLOCATE    Collection Time: 09/12/22  1:30 PM   Result Value Ref Range    HISTORY CHECKED?  Historical check performed    HGB & HCT    Collection Time: 09/12/22  4:30 PM   Result Value Ref Range    HGB 13.2 12.0 - 16.0 g/dL    HCT 42.0 35.0 - 45.0 %           Oxygen Therapy:  Oxygen Therapy  O2 Sat (%): 97 % (09/12/22 1920)  Pulse via Oximetry: 102 beats per minute (09/12/22 1810)  O2 Device: Nasal cannula (09/12/22 1844)  O2 Flow Rate (L/min): 2 l/min (09/12/22 1844)             Assessment:     Active Problems:    Fibroid uterus (9/12/2022)      S/P abdominal hysterectomy (9/12/2022)        Plan:     Post operative hypotension:  Check labs:  CBC  CMP    Check radiology:  CT abdomen/pelvis     Consult:  Hospitalist - appreciate consult and ICU care    Activity: Bed Rest    Disposition and Family: Updated Family at bedside    Genet Cote M.D.

## 2022-09-13 NOTE — PROGRESS NOTES
Gynecology Progress Note    Patient with hypotension last evening with RRt called. On loevofed being weaned. post-op day 1 from Procedure(s):  TOTAL ABDOMINAL HYSTERECTOMY, RIGHT URETERAL NEOCYSTOSTOMY WITH PSOAS HITCH WITH REPAIR OF BLADDER INJURY without significant complaints. Pain controlled on current medication. No nausea wanting to take po fluids. Urine is clearing and urine output is good. Reviewed all labs and ct , . Patient is not passing flatus. I think all fluid shifts responsible for bp changes doubt any bleeding now with guille output expected for urinary surgery and hematoma inleft abdomen not unexpected with all oozing during surgery. Will recheck h/h 4 hours from last to reassure however suspect this will be stable. Pts main concrn is pain control and she is happy with this. Vitals:  Blood pressure (!) 110/52, pulse (!) 106, temperature 98.6 °F (37 °C), resp. rate 11, height 5' 4\" (1.626 m), weight 100.2 kg (220 lb 12.8 oz), last menstrual period 2022, SpO2 97 %. Temp (24hrs), Av °F (36.7 °C), Min:97.2 °F (36.2 °C), Max:98.6 °F (37 °C)        Exam:  Patient without distress. Abdomen soft,  nontender. Positve bowel sounds                Incision dry and clean without erythema. Lower extremities are negative for , cords, or tenderness. Labs:   Recent Results (from the past 24 hour(s))   RBC, ALLOCATE    Collection Time: 22  9:45 AM   Result Value Ref Range    HISTORY CHECKED? Historical check performed    HGB & HCT    Collection Time: 22 10:30 AM   Result Value Ref Range    HGB 6.0 (L) 12.0 - 16.0 g/dL    HCT 18.6 (L) 35.0 - 45.0 %   RBC, ALLOCATE    Collection Time: 22  1:30 PM   Result Value Ref Range    HISTORY CHECKED?  Historical check performed    HGB & HCT    Collection Time: 22  4:30 PM   Result Value Ref Range    HGB 13.2 12.0 - 16.0 g/dL    HCT 42.0 35.0 - 74.1 %   METABOLIC PANEL, COMPREHENSIVE    Collection Time: 22 7:59 PM   Result Value Ref Range    Sodium 137 136 - 145 mmol/L    Potassium 5.0 3.5 - 5.5 mmol/L    Chloride 109 100 - 111 mmol/L    CO2 17 (L) 21 - 32 mmol/L    Anion gap 11 3.0 - 18 mmol/L    Glucose 215 (H) 74 - 99 mg/dL    BUN 15 7.0 - 18 MG/DL    Creatinine 1.11 0.6 - 1.3 MG/DL    BUN/Creatinine ratio 14 12 - 20      GFR est AA >60 >60 ml/min/1.73m2    GFR est non-AA 53 (L) >60 ml/min/1.73m2    Calcium 7.3 (L) 8.5 - 10.1 MG/DL    Bilirubin, total 1.6 (H) 0.2 - 1.0 MG/DL    ALT (SGPT) 105 (H) 13 - 56 U/L    AST (SGOT) 105 (H) 10 - 38 U/L    Alk. phosphatase 53 45 - 117 U/L    Protein, total 5.1 (L) 6.4 - 8.2 g/dL    Albumin 2.9 (L) 3.4 - 5.0 g/dL    Globulin 2.2 2.0 - 4.0 g/dL    A-G Ratio 1.3 0.8 - 1.7     RBC, ALLOCATE    Collection Time: 09/12/22  9:45 PM   Result Value Ref Range    HISTORY CHECKED?  Historical check performed    BLOOD GAS,CHEM8,LACTIC ACID POC    Collection Time: 09/12/22 10:14 PM   Result Value Ref Range    pH (POC) 7.37 7.35 - 7.45      pCO2 (POC) 28.3 (L) 35.0 - 45.0 MMHG    pO2 (POC) 143 (H) 80 - 100 MMHG    Calcium, ionized (POC) 0.96 (L) 1.12 - 1.32 mmol/L    Base deficit (POC) 8.2 mmol/L    HCO3 (POC) 16.2 (L) 22 - 26 MMOL/L    CO2, POC 16 (L) 19 - 24 MMOL/L    O2 SAT 99 %    Patient temp. 98.2      Sample source ARTERIAL      SITE RIGHT RADIAL      OTIS'S TEST Positive      Device: CPAP      FIO2 4 %    Performed by Tram Sagastume     Sodium (POC) 134 (L) 136 - 145 mmol/L    Potassium (POC) 4.6 3.5 - 5.1 mmol/L    Glucose (POC) 204 (H) 65 - 100 mg/dL    Creatinine (POC) 1.11 0.6 - 1.3 mg/dL    Lactic Acid (POC) 5.80 (HH) 0.40 - 2.00 mmol/L    Chloride (POC) 106 98 - 107 mmol/L    Anion gap, POC 13 10 - 20      GFRAA, POC >60 >60 ml/min/1.73m2    GFRNA, POC 53 (L) >60 ml/min/1.73m2   PROTHROMBIN TIME + INR    Collection Time: 09/12/22 10:15 PM   Result Value Ref Range    Prothrombin time 15.9 (H) 11.5 - 15.2 sec    INR 1.2 0.8 - 1.2     PTT    Collection Time: 09/12/22 10:15 PM Result Value Ref Range    aPTT 24.6 23.0 - 36.4 SEC   FIBRINOGEN    Collection Time: 09/12/22 10:15 PM   Result Value Ref Range    Fibrinogen 258 210 - 451 mg/dL   CBC WITH AUTOMATED DIFF    Collection Time: 09/12/22 10:15 PM   Result Value Ref Range    WBC 16.9 (H) 4.6 - 13.2 K/uL    RBC 3.36 (L) 4.20 - 5.30 M/uL    HGB 9.6 (L) 12.0 - 16.0 g/dL    HCT 28.4 (L) 35.0 - 45.0 %    MCV 84.5 78.0 - 100.0 FL    MCH 28.6 24.0 - 34.0 PG    MCHC 33.8 31.0 - 37.0 g/dL    RDW 15.8 (H) 11.6 - 14.5 %    PLATELET 432 207 - 705 K/uL    MPV 9.5 9.2 - 11.8 FL    NRBC 0.0 0  WBC    ABSOLUTE NRBC 0.00 0.00 - 0.01 K/uL    NEUTROPHILS 88 (H) 40 - 73 %    LYMPHOCYTES 6 (L) 21 - 52 %    MONOCYTES 6 3 - 10 %    EOSINOPHILS 0 0 - 5 %    BASOPHILS 0 0 - 2 %    IMMATURE GRANULOCYTES 1 (H) 0.0 - 0.5 %    ABS. NEUTROPHILS 14.9 (H) 1.8 - 8.0 K/UL    ABS. LYMPHOCYTES 0.9 0.9 - 3.6 K/UL    ABS. MONOCYTES 1.0 0.05 - 1.2 K/UL    ABS. EOSINOPHILS 0.0 0.0 - 0.4 K/UL    ABS. BASOPHILS 0.0 0.0 - 0.1 K/UL    ABS. IMM. GRANS. 0.1 (H) 0.00 - 0.04 K/UL    DF AUTOMATED     LIPASE    Collection Time: 09/12/22 10:15 PM   Result Value Ref Range    Lipase 223 73 - 393 U/L   GLUCOSE, POC    Collection Time: 09/12/22 11:34 PM   Result Value Ref Range    Glucose (POC) 185 (H) 70 - 110 mg/dL   CBC WITH AUTOMATED DIFF    Collection Time: 09/13/22  5:20 AM   Result Value Ref Range    WBC 12.0 4.6 - 13.2 K/uL    RBC 2.73 (L) 4.20 - 5.30 M/uL    HGB 7.7 (L) 12.0 - 16.0 g/dL    HCT 22.5 (L) 35.0 - 45.0 %    MCV 82.4 78.0 - 100.0 FL    MCH 28.2 24.0 - 34.0 PG    MCHC 34.2 31.0 - 37.0 g/dL    RDW 16.0 (H) 11.6 - 14.5 %    PLATELET 699 522 - 087 K/uL    MPV 9.7 9.2 - 11.8 FL    NRBC 0.0 0  WBC    ABSOLUTE NRBC 0.00 0.00 - 0.01 K/uL    NEUTROPHILS 80 (H) 40 - 73 %    LYMPHOCYTES 9 (L) 21 - 52 %    MONOCYTES 10 3 - 10 %    EOSINOPHILS 0 0 - 5 %    BASOPHILS 0 0 - 2 %    IMMATURE GRANULOCYTES 1 (H) 0.0 - 0.5 %    ABS. NEUTROPHILS 9.6 (H) 1.8 - 8.0 K/UL    ABS. LYMPHOCYTES 1.1 0.9 - 3.6 K/UL    ABS. MONOCYTES 1.2 0.05 - 1.2 K/UL    ABS. EOSINOPHILS 0.0 0.0 - 0.4 K/UL    ABS. BASOPHILS 0.0 0.0 - 0.1 K/UL    ABS. IMM. GRANS. 0.1 (H) 0.00 - 0.04 K/UL    DF AUTOMATED     METABOLIC PANEL, COMPREHENSIVE    Collection Time: 09/13/22  5:20 AM   Result Value Ref Range    Sodium 138 136 - 145 mmol/L    Potassium 4.5 3.5 - 5.5 mmol/L    Chloride 110 100 - 111 mmol/L    CO2 20 (L) 21 - 32 mmol/L    Anion gap 8 3.0 - 18 mmol/L    Glucose 140 (H) 74 - 99 mg/dL    BUN 15 7.0 - 18 MG/DL    Creatinine 0.98 0.6 - 1.3 MG/DL    BUN/Creatinine ratio 15 12 - 20      GFR est AA >60 >60 ml/min/1.73m2    GFR est non-AA >60 >60 ml/min/1.73m2    Calcium 7.1 (L) 8.5 - 10.1 MG/DL    Bilirubin, total 0.7 0.2 - 1.0 MG/DL    ALT (SGPT) 58 (H) 13 - 56 U/L    AST (SGOT) 65 (H) 10 - 38 U/L    Alk. phosphatase 37 (L) 45 - 117 U/L    Protein, total 4.5 (L) 6.4 - 8.2 g/dL    Albumin 2.6 (L) 3.4 - 5.0 g/dL    Globulin 1.9 (L) 2.0 - 4.0 g/dL    A-G Ratio 1.4 0.8 - 1.7     LIPASE    Collection Time: 09/13/22  5:20 AM   Result Value Ref Range    Lipase 259 73 - 393 U/L   TSH 3RD GENERATION    Collection Time: 09/13/22  5:20 AM   Result Value Ref Range    TSH 1.66 0.36 - 3.74 uIU/mL   MAGNESIUM    Collection Time: 09/13/22  5:20 AM   Result Value Ref Range    Magnesium 1.4 (L) 1.6 - 2.6 mg/dL   PHOSPHORUS    Collection Time: 09/13/22  5:20 AM   Result Value Ref Range    Phosphorus 2.2 (L) 2.5 - 4.9 MG/DL   CALCIUM, IONIZED    Collection Time: 09/13/22  5:21 AM   Result Value Ref Range    Ionized Calcium 1.00 (L) 1.12 - 1.32 MMOL/L   GLUCOSE, POC    Collection Time: 09/13/22  5:31 AM   Result Value Ref Range    Glucose (POC) 133 (H) 70 - 110 mg/dL       Assessment and Plan:   s/ppost Procedure(s):  TOTAL ABDOMINAL HYSTERECTOMY, RIGHT URETERAL NEOCYSTOSTOMY WITH PSOAS HITCH WITH REPAIR OF BLADDER INJURY  Continue post-op care and icu protocols. Appreciated intensivist, urology, and hospitalist following.  Discussed pts care with  who is on his way in and all questions given. Cell phone number and office number given to icu nurse for contac.

## 2022-09-13 NOTE — PROGRESS NOTES
Hospitalist Progress Note    Patient: Gretchen Beltre MRN: [id number]  CSN: [id number]    YOB: 1975  Age: 40 y.o. Sex: female    DOA: 9/12/2022 LOS:  LOS: 1 day                Assessment/Plan     Patient Active Problem List   Diagnosis Code    Abdominal pain R10.9    Fibroid uterus D25.9    S/P abdominal hysterectomy Z90.710    Hypovolemic shock (HCC) R57.1    Sinus tachycardia R00.0    Bladder injury S37.20XA    Acute renal failure (ARF) (HCC) N17.9    Hematuria R31.9    Poor intravenous access Z78.9        Chief complaint :  40 y.o. female with past medical history significant for Fibromyalgia, Bowel obstruction, uterine fibroids, Hypothyroidism presents to hospital for elective hysterectomy And lysis of adhesion, Found to have large uterus unfortunately right ureter transensected with midline bladder injury. Patient is post posoas hitch employed and ureter was reimplanted under no tension  Patient had EBL of 2000 cc    Post op developed hypotension and was transferred to ICU     CRITICAL CARE PLAN    Resp -   Stable respiratory status. Will use oxygen by NC as needed. ID -   ANTIBIOTICS zosyn. CVS - Monitor HD. Wean pressors, levo for MAP>65. Hypovolemic shock - on levophed  IVF and albumin    Heme/onc - Follow H&H, plts. INR. Acute blood loss anemia -   Transfuse to keep Hb>7    Renal - Trend BUN, Cr, follow I/O, burnette in place. Check and replace Mg, K, phos. Monitor urine output     Endocrine -  Follow FSG    Neuro/ Pain/ Sedation -     GI - clear liquid diet    Prophylaxis - DVT: scd, GI: protonix     Disposition : TBD    Review of systems  General: No fevers or chills. Cardiovascular: No chest pain or pressure. No palpitations. Pulmonary: No shortness of breath. Gastrointestinal: No nausea, vomiting. Physical Exam:  General: Awake, cooperative, no acute distress    HEENT: NC, Atraumatic. PERRLA, anicteric sclerae. Lungs: CTA Bilaterally.  No Wheezing/Rhonchi/Rales. Heart:  S1 S2,  No murmur, No Rubs, No Gallops  Abdomen: Post op abdomen   Extremities: No c/c/e  Psych:   Not anxious or agitated. Neurologic:  No acute neurological deficit. Vital signs/Intake and Output:  Visit Vitals  BP (!) 110/57   Pulse 100   Temp 98 °F (36.7 °C)   Resp 16   Ht 5' 4\" (1.626 m)   Wt 99.8 kg (220 lb)   SpO2 95%   BMI 37.76 kg/m²     Current Shift:  09/13 0701 - 09/13 1900  In: 338.3   Out: 2675 [Urine:2675]  Last three shifts:  09/11 1901 - 09/13 0700  In: 7510 [I.V.:6500]  Out: 3750 [Urine:1675; Drains:175]            Labs: Results:       Chemistry Recent Labs     09/13/22  0520 09/12/22 1959   * 215*    137   K 4.5 5.0    109   CO2 20* 17*   BUN 15 15   CREA 0.98 1.11   CA 7.1* 7.3*   AGAP 8 11   BUCR 15 14   AP 37* 53   TP 4.5* 5.1*   ALB 2.6* 2.9*   GLOB 1.9* 2.2   AGRAT 1.4 1.3      CBC w/Diff Recent Labs     09/13/22 1237 09/13/22 0520 09/12/22 2215   WBC  --  12.0 16.9*   RBC  --  2.73* 3.36*   HGB 6.3* 7.7* 9.6*   HCT 18.7* 22.5* 28.4*   PLT  --  150 157   GRANS  --  80* 88*   LYMPH  --  9* 6*   EOS  --  0 0      Cardiac Enzymes No results for input(s): CPK, CKND1, EDGARDO in the last 72 hours. No lab exists for component: CKRMB, TROIP   Coagulation Recent Labs     09/13/22 1237 09/12/22 2215   PTP 15.9* 15.9*   INR 1.2 1.2   APTT 27.1 24.6       Lipid Panel Lab Results   Component Value Date/Time    Cholesterol, total 153 07/13/2016 08:05 AM    HDL Cholesterol 58 07/13/2016 08:05 AM    LDL, calculated 64.6 07/13/2016 08:05 AM    VLDL, calculated 30.4 07/13/2016 08:05 AM    Triglyceride 152 (H) 07/13/2016 08:05 AM    CHOL/HDL Ratio 2.6 07/13/2016 08:05 AM      BNP No results for input(s): BNPP in the last 72 hours.    Liver Enzymes Recent Labs     09/13/22  0520   TP 4.5*   ALB 2.6*   AP 37*      Thyroid Studies Lab Results   Component Value Date/Time    TSH 1.66 09/13/2022 05:20 AM        Procedures/imaging: see electronic medical records for all procedures/Xrays and details which were not copied into this note but were reviewed prior to creation of Plan

## 2022-09-13 NOTE — PROGRESS NOTES
Pts only complaint is pain at urethra from catheter, receiving another unit prbc, feeling ok, drinking fluids.  Avel use lidex gel to help pt move around and feel better at urethra since catheter will be in for anumber of weeks

## 2022-09-13 NOTE — PROGRESS NOTES
Pharmacy Dosing Services: Vancomycin    Consult for Vancomycin Dosing by Pharmacy by Dr. Opal Wyman provided for this 40y.o. year old female , for indication of Intra-abdominal/Sepsis. Day of Therapy 01    Ht Readings from Last 1 Encounters:   09/12/22 162.6 cm (64\")        Wt Readings from Last 1 Encounters:   09/12/22 100.2 kg (220 lb 12.8 oz)        Other Current Antibiotics Zosyn 3.375g iv q8   Significant Cultures pending   Serum Creatinine Lab Results   Component Value Date/Time    Creatinine 1.11 09/12/2022 07:59 PM    Creatinine (POC) 1.11 09/12/2022 10:14 PM      Creatinine Clearance Estimated Creatinine Clearance: 73.7 mL/min (based on SCr of 1.11 mg/dL). BUN Lab Results   Component Value Date/Time    BUN 15 09/12/2022 07:59 PM      WBC Lab Results   Component Value Date/Time    WBC 16.9 (H) 09/12/2022 10:15 PM      H/H Lab Results   Component Value Date/Time    HGB 9.6 (L) 09/12/2022 10:15 PM      Platelets Lab Results   Component Value Date/Time    PLATELET 128 23/88/5115 10:15 PM      Temp 98.2 °F (36.8 °C)     Start Vancomycin therapy, with loading dose of 2000 mg IV once on 9/12/2022 @2200. Followed with maintenance dose of Vancomycin 1000 mg IV q12h. Dose calculated to approximate a therapeutic trough of 15-20 mcg/mL. Pharmacy to follow daily and will make changes to dose and/or frequency based on clinical status. LaurenD.   Ray [phone]

## 2022-09-13 NOTE — PROGRESS NOTES
Pulm/CC    Labs reviewed from afternoon-hemoglobin 6.3; ionized calcium 1.09; INR 1.2, fibrinogen 372  Lactic acid 1.7. No active bleeding reported in HOMER drain. Patient getting 1 unit PRBC now. Calcium replaced.     Shahzad Ospina MD
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
## 2022-09-14 PROBLEM — N17.9 ACUTE RENAL FAILURE (ARF) (HCC): Status: RESOLVED | Noted: 2022-09-12 | Resolved: 2022-09-14

## 2022-09-14 PROBLEM — N32.89 BLADDER SPASM: Status: ACTIVE | Noted: 2022-09-14

## 2022-09-14 PROBLEM — Z78.9 POOR INTRAVENOUS ACCESS: Status: RESOLVED | Noted: 2022-09-12 | Resolved: 2022-09-14

## 2022-09-14 LAB
ABO + RH BLD: NORMAL
ALBUMIN SERPL-MCNC: 2.8 G/DL (ref 3.4–5)
ALBUMIN/GLOB SERPL: 1.5 {RATIO} (ref 0.8–1.7)
ALP SERPL-CCNC: 39 U/L (ref 45–117)
ALT SERPL-CCNC: 35 U/L (ref 13–56)
ANION GAP SERPL CALC-SCNC: 4 MMOL/L (ref 3–18)
APTT PPP: 25.7 SEC (ref 23–36.4)
AST SERPL-CCNC: 47 U/L (ref 10–38)
BASOPHILS # BLD: 0 K/UL (ref 0–0.1)
BASOPHILS NFR BLD: 0 % (ref 0–2)
BILIRUB SERPL-MCNC: 0.8 MG/DL (ref 0.2–1)
BLD PROD TYP BPU: NORMAL
BLOOD GROUP ANTIBODIES SERPL: NORMAL
BPU ID: NORMAL
BUN SERPL-MCNC: 9 MG/DL (ref 7–18)
BUN/CREAT SERPL: 16 (ref 12–20)
CA-I SERPL-SCNC: 1.06 MMOL/L (ref 1.12–1.32)
CA-I SERPL-SCNC: 1.12 MMOL/L (ref 1.12–1.32)
CALCIUM SERPL-MCNC: 7.4 MG/DL (ref 8.5–10.1)
CALLED TO:,BCALL1: NORMAL
CALLED TO:,BCALL2: NORMAL
CALLED TO:,BCALL3: NORMAL
CHLORIDE SERPL-SCNC: 110 MMOL/L (ref 100–111)
CO2 SERPL-SCNC: 27 MMOL/L (ref 21–32)
CREAT SERPL-MCNC: 0.58 MG/DL (ref 0.6–1.3)
CROSSMATCH RESULT,%XM: NORMAL
DIFFERENTIAL METHOD BLD: ABNORMAL
EOSINOPHIL # BLD: 0 K/UL (ref 0–0.4)
EOSINOPHIL NFR BLD: 1 % (ref 0–5)
ERYTHROCYTE [DISTWIDTH] IN BLOOD BY AUTOMATED COUNT: 15.5 % (ref 11.6–14.5)
FIBRINOGEN PPP-MCNC: 498 MG/DL (ref 210–451)
GLOBULIN SER CALC-MCNC: 1.9 G/DL (ref 2–4)
GLUCOSE BLD STRIP.AUTO-MCNC: 104 MG/DL (ref 70–110)
GLUCOSE BLD STRIP.AUTO-MCNC: 113 MG/DL (ref 70–110)
GLUCOSE BLD STRIP.AUTO-MCNC: 120 MG/DL (ref 70–110)
GLUCOSE SERPL-MCNC: 118 MG/DL (ref 74–99)
HCT VFR BLD AUTO: 18.8 % (ref 35–45)
HCT VFR BLD AUTO: 28.8 % (ref 35–45)
HGB BLD-MCNC: 6.4 G/DL (ref 12–16)
HGB BLD-MCNC: 9.4 G/DL (ref 12–16)
HISTORY CHECKED?,CKHIST: NORMAL
IMM GRANULOCYTES # BLD AUTO: 0 K/UL (ref 0–0.04)
IMM GRANULOCYTES NFR BLD AUTO: 1 % (ref 0–0.5)
INR PPP: 1.1 (ref 0.8–1.2)
LIPASE SERPL-CCNC: 196 U/L (ref 73–393)
LYMPHOCYTES # BLD: 1 K/UL (ref 0.9–3.6)
LYMPHOCYTES NFR BLD: 16 % (ref 21–52)
MAGNESIUM SERPL-MCNC: 1.9 MG/DL (ref 1.6–2.6)
MCH RBC QN AUTO: 28.7 PG (ref 24–34)
MCHC RBC AUTO-ENTMCNC: 34 G/DL (ref 31–37)
MCV RBC AUTO: 84.3 FL (ref 78–100)
MONOCYTES # BLD: 0.4 K/UL (ref 0.05–1.2)
MONOCYTES NFR BLD: 7 % (ref 3–10)
NEUTS SEG # BLD: 4.7 K/UL (ref 1.8–8)
NEUTS SEG NFR BLD: 76 % (ref 40–73)
NRBC # BLD: 0 K/UL (ref 0–0.01)
NRBC BLD-RTO: 0 PER 100 WBC
PHOSPHATE SERPL-MCNC: 1.9 MG/DL (ref 2.5–4.9)
PHOSPHATE SERPL-MCNC: 2.1 MG/DL (ref 2.5–4.9)
PLATELET # BLD AUTO: 91 K/UL (ref 135–420)
PMV BLD AUTO: 9.6 FL (ref 9.2–11.8)
POTASSIUM SERPL-SCNC: 3.6 MMOL/L (ref 3.5–5.5)
POTASSIUM SERPL-SCNC: 3.8 MMOL/L (ref 3.5–5.5)
PROT SERPL-MCNC: 4.7 G/DL (ref 6.4–8.2)
PROTHROMBIN TIME: 15 SEC (ref 11.5–15.2)
RBC # BLD AUTO: 2.23 M/UL (ref 4.2–5.3)
SODIUM SERPL-SCNC: 141 MMOL/L (ref 136–145)
SPECIMEN EXP DATE BLD: NORMAL
STATUS OF UNIT,%ST: NORMAL
UNIT DIVISION, %UDIV: 0
WBC # BLD AUTO: 6.2 K/UL (ref 4.6–13.2)

## 2022-09-14 PROCEDURE — 84100 ASSAY OF PHOSPHORUS: CPT

## 2022-09-14 PROCEDURE — 74011000258 HC RX REV CODE- 258: Performed by: INTERNAL MEDICINE

## 2022-09-14 PROCEDURE — 36415 COLL VENOUS BLD VENIPUNCTURE: CPT

## 2022-09-14 PROCEDURE — 85610 PROTHROMBIN TIME: CPT

## 2022-09-14 PROCEDURE — 85730 THROMBOPLASTIN TIME PARTIAL: CPT

## 2022-09-14 PROCEDURE — 65610000006 HC RM INTENSIVE CARE

## 2022-09-14 PROCEDURE — 74011250636 HC RX REV CODE- 250/636: Performed by: INTERNAL MEDICINE

## 2022-09-14 PROCEDURE — P9047 ALBUMIN (HUMAN), 25%, 50ML: HCPCS | Performed by: INTERNAL MEDICINE

## 2022-09-14 PROCEDURE — 85018 HEMOGLOBIN: CPT

## 2022-09-14 PROCEDURE — 74011000250 HC RX REV CODE- 250: Performed by: INTERNAL MEDICINE

## 2022-09-14 PROCEDURE — 82330 ASSAY OF CALCIUM: CPT

## 2022-09-14 PROCEDURE — 83735 ASSAY OF MAGNESIUM: CPT

## 2022-09-14 PROCEDURE — 74011250637 HC RX REV CODE- 250/637: Performed by: INTERNAL MEDICINE

## 2022-09-14 PROCEDURE — 82962 GLUCOSE BLOOD TEST: CPT

## 2022-09-14 PROCEDURE — 84132 ASSAY OF SERUM POTASSIUM: CPT

## 2022-09-14 PROCEDURE — P9016 RBC LEUKOCYTES REDUCED: HCPCS

## 2022-09-14 PROCEDURE — 85384 FIBRINOGEN ACTIVITY: CPT

## 2022-09-14 PROCEDURE — 74011250637 HC RX REV CODE- 250/637: Performed by: OBSTETRICS & GYNECOLOGY

## 2022-09-14 PROCEDURE — 86900 BLOOD TYPING SEROLOGIC ABO: CPT

## 2022-09-14 PROCEDURE — 83690 ASSAY OF LIPASE: CPT

## 2022-09-14 PROCEDURE — 77010033678 HC OXYGEN DAILY

## 2022-09-14 PROCEDURE — 87040 BLOOD CULTURE FOR BACTERIA: CPT

## 2022-09-14 PROCEDURE — 36430 TRANSFUSION BLD/BLD COMPNT: CPT

## 2022-09-14 PROCEDURE — 85025 COMPLETE CBC W/AUTO DIFF WBC: CPT

## 2022-09-14 RX ORDER — SODIUM,POTASSIUM PHOSPHATES 280-250MG
2 POWDER IN PACKET (EA) ORAL
Status: COMPLETED | OUTPATIENT
Start: 2022-09-14 | End: 2022-09-14

## 2022-09-14 RX ORDER — HYOSCYAMINE SULFATE 0.38 MG/1
375 TABLET, EXTENDED RELEASE ORAL
Status: DISCONTINUED | OUTPATIENT
Start: 2022-09-14 | End: 2022-09-14

## 2022-09-14 RX ORDER — HYOSCYAMINE SULFATE 0.38 MG/1
375 TABLET, EXTENDED RELEASE ORAL EVERY 8 HOURS
Status: DISCONTINUED | OUTPATIENT
Start: 2022-09-14 | End: 2022-09-15 | Stop reason: DRUGHIGH

## 2022-09-14 RX ORDER — MORPHINE SULFATE 2 MG/ML
2 INJECTION, SOLUTION INTRAMUSCULAR; INTRAVENOUS ONCE
Status: COMPLETED | OUTPATIENT
Start: 2022-09-14 | End: 2022-09-14

## 2022-09-14 RX ORDER — CALCIUM GLUCONATE 20 MG/ML
2 INJECTION, SOLUTION INTRAVENOUS ONCE
Status: COMPLETED | OUTPATIENT
Start: 2022-09-14 | End: 2022-09-14

## 2022-09-14 RX ORDER — FENTANYL CITRATE 50 UG/ML
50 INJECTION, SOLUTION INTRAMUSCULAR; INTRAVENOUS ONCE
Status: COMPLETED | OUTPATIENT
Start: 2022-09-14 | End: 2022-09-14

## 2022-09-14 RX ORDER — SODIUM CHLORIDE 9 MG/ML
250 INJECTION, SOLUTION INTRAVENOUS AS NEEDED
Status: DISCONTINUED | OUTPATIENT
Start: 2022-09-14 | End: 2022-09-19 | Stop reason: HOSPADM

## 2022-09-14 RX ORDER — ALBUMIN HUMAN 250 G/1000ML
12.5 SOLUTION INTRAVENOUS EVERY 12 HOURS
Status: DISCONTINUED | OUTPATIENT
Start: 2022-09-14 | End: 2022-09-19 | Stop reason: HOSPADM

## 2022-09-14 RX ORDER — POTASSIUM CHLORIDE 20 MEQ/1
40 TABLET, EXTENDED RELEASE ORAL ONCE
Status: COMPLETED | OUTPATIENT
Start: 2022-09-14 | End: 2022-09-14

## 2022-09-14 RX ORDER — POTASSIUM CHLORIDE 750 MG/1
10 TABLET, EXTENDED RELEASE ORAL
Status: COMPLETED | OUTPATIENT
Start: 2022-09-14 | End: 2022-09-14

## 2022-09-14 RX ORDER — NALOXONE HYDROCHLORIDE 0.4 MG/ML
0.1 INJECTION, SOLUTION INTRAMUSCULAR; INTRAVENOUS; SUBCUTANEOUS
Status: DISCONTINUED | OUTPATIENT
Start: 2022-09-14 | End: 2022-09-19 | Stop reason: HOSPADM

## 2022-09-14 RX ORDER — HYOSCYAMINE SULFATE 0.38 MG/1
375 TABLET, EXTENDED RELEASE ORAL EVERY 12 HOURS
Status: DISCONTINUED | OUTPATIENT
Start: 2022-09-14 | End: 2022-09-14

## 2022-09-14 RX ORDER — FENTANYL 50 UG/1
1 PATCH TRANSDERMAL
Status: DISCONTINUED | OUTPATIENT
Start: 2022-09-14 | End: 2022-09-15

## 2022-09-14 RX ORDER — PHENAZOPYRIDINE HYDROCHLORIDE 100 MG/1
200 TABLET, FILM COATED ORAL
Status: DISCONTINUED | OUTPATIENT
Start: 2022-09-14 | End: 2022-09-19 | Stop reason: HOSPADM

## 2022-09-14 RX ORDER — MORPHINE SULFATE 2 MG/ML
2 INJECTION, SOLUTION INTRAMUSCULAR; INTRAVENOUS
Status: DISCONTINUED | OUTPATIENT
Start: 2022-09-14 | End: 2022-09-15

## 2022-09-14 RX ADMIN — POTASSIUM & SODIUM PHOSPHATES POWDER PACK 280-160-250 MG 2 PACKET: 280-160-250 PACK at 21:13

## 2022-09-14 RX ADMIN — CLONAZEPAM 0.5 MG: 0.5 TABLET ORAL at 14:55

## 2022-09-14 RX ADMIN — CLONAZEPAM 0.5 MG: 0.5 TABLET ORAL at 21:13

## 2022-09-14 RX ADMIN — ALBUMIN (HUMAN) 12.5 G: 0.25 INJECTION, SOLUTION INTRAVENOUS at 05:04

## 2022-09-14 RX ADMIN — PHENAZOPYRIDINE HYDROCHLORIDE 200 MG: 100 TABLET ORAL at 14:28

## 2022-09-14 RX ADMIN — HYOSCYAMINE SULFATE 375 MCG: 0.38 TABLET, EXTENDED RELEASE ORAL at 12:00

## 2022-09-14 RX ADMIN — HYDROCODONE BITARTRATE AND ACETAMINOPHEN 1 TABLET: 10; 325 TABLET ORAL at 04:11

## 2022-09-14 RX ADMIN — POTASSIUM & SODIUM PHOSPHATES POWDER PACK 280-160-250 MG 2 PACKET: 280-160-250 PACK at 23:00

## 2022-09-14 RX ADMIN — PHENAZOPYRIDINE HYDROCHLORIDE 200 MG: 100 TABLET ORAL at 18:11

## 2022-09-14 RX ADMIN — NORTRIPTYLINE HYDROCHLORIDE 10 MG: 10 CAPSULE ORAL at 21:17

## 2022-09-14 RX ADMIN — LEVOTHYROXINE SODIUM 50 MCG: 0.05 TABLET ORAL at 05:05

## 2022-09-14 RX ADMIN — CALCIUM GLUCONATE 2 G: 20 INJECTION, SOLUTION INTRAVENOUS at 05:04

## 2022-09-14 RX ADMIN — POTASSIUM CHLORIDE 40 MEQ: 1500 TABLET, EXTENDED RELEASE ORAL at 14:55

## 2022-09-14 RX ADMIN — POTASSIUM CHLORIDE 10 MEQ: 750 TABLET, EXTENDED RELEASE ORAL at 05:18

## 2022-09-14 RX ADMIN — HYDROCODONE BITARTRATE AND ACETAMINOPHEN 1 TABLET: 10; 325 TABLET ORAL at 12:39

## 2022-09-14 RX ADMIN — POTASSIUM & SODIUM PHOSPHATES POWDER PACK 280-160-250 MG 2 PACKET: 280-160-250 PACK at 16:40

## 2022-09-14 RX ADMIN — HYDROMORPHONE HYDROCHLORIDE 1 MG: 1 INJECTION, SOLUTION INTRAMUSCULAR; INTRAVENOUS; SUBCUTANEOUS at 05:18

## 2022-09-14 RX ADMIN — SODIUM CHLORIDE, PRESERVATIVE FREE 10 ML: 5 INJECTION INTRAVENOUS at 14:00

## 2022-09-14 RX ADMIN — POTASSIUM & SODIUM PHOSPHATES POWDER PACK 280-160-250 MG 2 PACKET: 280-160-250 PACK at 07:30

## 2022-09-14 RX ADMIN — SODIUM CHLORIDE, PRESERVATIVE FREE 10 ML: 5 INJECTION INTRAVENOUS at 21:16

## 2022-09-14 RX ADMIN — POTASSIUM & SODIUM PHOSPHATES POWDER PACK 280-160-250 MG 2 PACKET: 280-160-250 PACK at 17:48

## 2022-09-14 RX ADMIN — CEFTRIAXONE 1 G: 1 INJECTION, POWDER, FOR SOLUTION INTRAMUSCULAR; INTRAVENOUS at 09:34

## 2022-09-14 RX ADMIN — ONDANSETRON 4 MG: 2 INJECTION INTRAMUSCULAR; INTRAVENOUS at 16:40

## 2022-09-14 RX ADMIN — MORPHINE SULFATE 2 MG: 2 INJECTION, SOLUTION INTRAMUSCULAR; INTRAVENOUS at 17:47

## 2022-09-14 RX ADMIN — MORPHINE SULFATE 2 MG: 2 INJECTION, SOLUTION INTRAMUSCULAR; INTRAVENOUS at 09:33

## 2022-09-14 RX ADMIN — HYDROMORPHONE HYDROCHLORIDE 1 MG: 1 INJECTION, SOLUTION INTRAMUSCULAR; INTRAVENOUS; SUBCUTANEOUS at 07:48

## 2022-09-14 RX ADMIN — SODIUM CHLORIDE, PRESERVATIVE FREE 10 ML: 5 INJECTION INTRAVENOUS at 05:05

## 2022-09-14 RX ADMIN — POTASSIUM & SODIUM PHOSPHATES POWDER PACK 280-160-250 MG 2 PACKET: 280-160-250 PACK at 05:18

## 2022-09-14 RX ADMIN — HYOSCYAMINE SULFATE 375 MCG: 0.38 TABLET, EXTENDED RELEASE ORAL at 18:00

## 2022-09-14 RX ADMIN — PHENAZOPYRIDINE HYDROCHLORIDE 200 MG: 100 TABLET ORAL at 09:34

## 2022-09-14 RX ADMIN — PIPERACILLIN AND TAZOBACTAM 3.38 G: 3; .375 INJECTION, POWDER, FOR SOLUTION INTRAVENOUS at 02:22

## 2022-09-14 RX ADMIN — MORPHINE SULFATE 2 MG: 2 INJECTION, SOLUTION INTRAMUSCULAR; INTRAVENOUS at 11:12

## 2022-09-14 RX ADMIN — ALBUMIN (HUMAN) 12.5 G: 0.25 INJECTION, SOLUTION INTRAVENOUS at 21:29

## 2022-09-14 RX ADMIN — VITAM B12 100 MCG: 100 TAB at 08:26

## 2022-09-14 RX ADMIN — CLONAZEPAM 0.5 MG: 0.5 TABLET ORAL at 08:26

## 2022-09-14 RX ADMIN — HYDROCODONE BITARTRATE AND ACETAMINOPHEN 1 TABLET: 10; 325 TABLET ORAL at 22:44

## 2022-09-14 RX ADMIN — FENTANYL CITRATE 50 MCG: 50 INJECTION, SOLUTION INTRAMUSCULAR; INTRAVENOUS at 08:24

## 2022-09-14 NOTE — PROGRESS NOTES
Assumed care of pt. 9600: Patientt complained of pain gave pain medication see MAR.  9342: Received order from Dr. Valdo Wilson for fentanyl one time for pt increased pain level. 4379: Received an order for Morphine was ordered one time for pt pain. Patient was also started on pyridum for bladder pain. 1054: Asked for pain medication to be changed to fentanyl. Patient states that this medication works better. Patient received morphine shortly after and reported to be more comfortable and was able to rest for and hour. Patient stated she wasn't sure if it was the morphine or the fentanyl that gave her relief.  1106: Patient refused oral norco and asked for IV pain medication. Discussed  pain mediation with Dr. Valdo Wilson and received order for morphine 2mg. 1116: Patient educated on the short term relief of IV pain medication and the long lasting relief of oral pain medication. This RN educated pt on the benefits of alternating between the two medication of longer relief. Patient stated she understands. Patient also educated on new medication for bladder spasms. 1136: Called doctor Ortiz per Dr. Valdo Wilson regarding pt  unrelieved pain left message for call back. 1142: Patient given medication for bladder spasms. Will reassess. Awaiting call back from [address] [place] [address]. 1203: pt refused to allow lab to draw noon labs. Patient did allow RN to attempt to draw from midline. This RN tried midline not enough blood to get labs. Awaiting call back from [address] [place] [address] regarding patient status. Patient complains she is in extreme pain asking for epidural.   1228: Spoke with  there is nothing other than [place] Felton  which is in national shortage. 1253: Fentanyl patch applied pt stated that spasms are mor tolerable. Dr. Ortiz came to round on patient and was updated. 1320: pt resting no sign of distress. Pain is tolerable per patient. 1630: Pt resting no sign of distress. Patient is comfortable at this time.

## 2022-09-14 NOTE — PROGRESS NOTES
Bedside shift change report given to [address] (oncoming nurse) by Tanya Davis RN (offgoing nurse). Report included the following information SBAR, Kardex, Procedure Summary, and Intake/Output.

## 2022-09-14 NOTE — OP NOTES
[facility] [place]  OPERATIVE REPORT    Name:  Magda Bradley  MR#:   [id number]  :  1975  ACCOUNT #:  [de-identified]  DATE OF SERVICE:  2022    PREOPERATIVE DIAGNOSES:  Right ureteral transection and midline bladder injury. POSTOPERATIVE DIAGNOSES:  Right ureteral transection and midline bladder injury. PROCEDURES PERFORMED:  Right ureteroneocystostomy with psoas hitch and repair of midline bladder injury. SURGEON:  Lorraine Francisco MD    ASSISTANTS:  Cecil Cuellar MD and Kandi Tilley MD    ANESTHESIA:  General.    COMPLICATIONS:  None from my part of the surgery. SPECIMENS REMOVED:  None from my part of the surgery. IMPLANTS:  A 6-German 22 cm ureteral stent. DRAINS:  Ameya-Stewart 10 flat and a 16-German Wilkerson catheter. ESTIMATED BLOOD LOSS:  Minimal during my part of the surgery, but was almost 2 L during the hysterectomy portion of things. FINDINGS:  The patient had a very large uterus that had already been removed when I entered the surgical arena. The right ureter had been transected around the area of the iliac bifurcation. There was also large midline bladder injury. Psoas hitch was employed for the right ureter to be reimplanted under no tension. There was no active leak from the bladder or from the ureter at the end of the case. INDICATIONS:  The patient is a 28-year-old female who had hysterectomy for an extremely large uterus. I was consulted because there have been transection on the ureter and a bladder injury. PROCEDURE:  I entered the surgical arena. Initially, I was shown a tubular structure that had been tied off and was told this was the ureter and the bladder injury on the anterior surface going towards the dome from the bladder neck in the midline. Previously, a transverse abdominal incision had been made in a Pfannenstiel style. The uterus had already been removed and was quite large and sitting in the specimen container.   There was minimal active bleeding at this point. I evaluated the situation and dissected out what they thought was the ureteral injury, and as I dissected it caudally, I recognized that it appeared to be the internal iliac artery. The external iliac artery was totally intact. The veins were intact. As I examined the tissue further, I eventually did find the right ureter that had also been ligated and right near the iliac bifurcation. This was extremely distended due to the ligation and being filled up with urine. This was dissected out more proximally and freed up. This was quite high up. In assessing the bladder, it became evident that a Boari flap was not possible due to the way the bladder injury had occurred. At this point, I then went over looking on the left side and I was able to dissect out the ureter on the left and it was unharmed and there was no hydroureter and I could see a coursing past the left ovary and peristalsing. There was no injury at that point. At this point, I asked Dr. Sandra Miller to make sure all bleeding was controlled from her standpoint, to inspect her area and inspect her vaginal cuff because once I repaired the bladder and reimplanted the ureter, I did not want her having to do more surgery in the pelvis for fear of injuring the ureteral anastomosis to the bladder. She then looked and tied off a few vessels and there was no active bleeding at this point from the pelvis and she said she was done with her portion. At this point, I mobilized the bladder by taking down the lateral peritoneal reflections. I moved the bladder over to the right psoas. At this point, Dr. Gomez Christina came in and I showed him how the tension would be and he agreed that the tension was minimal and everything should heal nicely. Unfortunately, the patient's bladder has relatively small capacity.   I did a psoas hitch using a 2-0 silk suture, two of them placed into the detrusor muscle, but not going into the lining of the bladder and then just onto the superficial aspect of the psoas muscle, care was taken not to go deep at all. Two separate stitches were placed in the usual longitudinal fashion securing the bladder to the right psoas. At this point, a small incision was made in the bladder and the ureteral stump was brought through. The end of the ureter where it had been ligated at this point had been cut away and then the ureter was reimplanted into the bladder at the dome of the psoas hitch with interrupted 4-0 Vicryl sutures. A 6-Surinamese 22-cm stent was inserted without difficulty and was palpated going up the ureter towards the kidney. The bladder was then closed in two layers, a 3-0 Vicryl mucosal layer and a 2-0 Vicryl running muscular layer. Next, superolateral to the incision, a Ameya-Stewart drain was placed in the pelvic gutter, but away from the anastomosis and away from the bladder repair. At this point, Dr. Chandana Schwab came in to close the muscle and incisions. At this point, I broke scrub from surgery and exited the operating room.       Viet Sheppard MD      [id number]  D:  09/13/2022 15:13  T:  09/14/2022 5:45  JOB #:  [id number]

## 2022-09-14 NOTE — PROGRESS NOTES
Hospitalist Progress Note    Patient: Dwain Hull MRN: [id number]  CSN: [id number]    YOB: 1975  Age: 40 y.o. Sex: female    DOA: 9/12/2022 LOS:  LOS: 2 days                Assessment/Plan     Patient Active Problem List   Diagnosis Code    Abdominal pain R10.9    Fibroid uterus D25.9    S/P abdominal hysterectomy Z90.710    Hypovolemic shock (HCC) R57.1    Sinus tachycardia R00.0    Bladder injury S37.20XA    Hematuria R31.9    Bladder spasm N32.89        Chief complaint :  40 y.o. female with past medical history significant for Fibromyalgia, Bowel obstruction, uterine fibroids, Hypothyroidism presents to hospital for elective hysterectomy And lysis of adhesion, Found to have large uterus unfortunately right ureter transensected with midline bladder injury. Patient is post posoas hitch employed and ureter was reimplanted under no tension  Patient had EBL of 2000 cc    Post op developed hypotension and was transferred to ICU     Complains of lower abdominal pain, spasm    CRITICAL CARE PLAN    Resp -   Stable respiratory status. Will use oxygen by NC as needed. ID -   ANTIBIOTICS zosyn. CVS - Monitor HD. Wean pressors, levo for MAP>65. Hypovolemic shock - off levophed  IVF and albumin    Heme/onc - Follow H&H, plts. INR. Acute blood loss anemia -   Transfuse to keep Hb>7    Renal - Trend BUN, Cr, follow I/O, burnette in place. Check and replace Mg, K, phos. Monitor urine output     Endocrine -  Follow FSG    Neuro/ Pain/ Sedation -     GI - regular diet    Prophylaxis - DVT: scd, GI: protonix     Disposition : TBD    Review of systems  General: No fevers or chills. Cardiovascular: No chest pain or pressure. No palpitations. Pulmonary: No shortness of breath. Gastrointestinal: No nausea, vomiting. Physical Exam:  General: Awake, cooperative, no acute distress    HEENT: NC, Atraumatic. PERRLA, anicteric sclerae. Lungs: CTA Bilaterally.  No Wheezing/Rhonchi/Rales. Heart:  S1 S2,  No murmur, No Rubs, No Gallops  Abdomen: Post op abdomen   Extremities: No c/c/e  Psych:   Not anxious or agitated. Neurologic:  No acute neurological deficit. Vital signs/Intake and Output:  Visit Vitals  /71   Pulse (!) 104   Temp 98.5 °F (36.9 °C)   Resp 18   Ht 5' 4\" (1.626 m)   Wt 99.8 kg (220 lb)   SpO2 92%   BMI 37.76 kg/m²     Current Shift:  09/14 0701 - 09/14 1900  In: 600 [P.O.:240]  Out: 3536 [Urine:2300; Drains:15]  Last three shifts:  09/12 1901 - 09/14 0700  In: 2375.3 [I.V.:2037]  Out: 7580 [Urine:7425; Drains:155]            Labs: Results:       Chemistry Recent Labs     09/14/22  1350 09/14/22  0400 09/13/22  0520 09/12/22 1959   GLU  --  118* 140* 215*   NA  --  141 138 137   K 3.6 3.8 4.5 5.0   CL  --  110 110 109   CO2  --  27 20* 17*   BUN  --  9 15 15   CREA  --  0.58* 0.98 1.11   CA  --  7.4* 7.1* 7.3*   AGAP  --  4 8 11   BUCR  --  16 15 14   AP  --  39* 37* 53   TP  --  4.7* 4.5* 5.1*   ALB  --  2.8* 2.6* 2.9*   GLOB  --  1.9* 1.9* 2.2   AGRAT  --  1.5 1.4 1.3      CBC w/Diff Recent Labs     09/14/22  1345 09/14/22  0400 09/13/22 1953 09/13/22  1237 09/13/22  0520 09/12/22 2215   WBC  --  6.2  --   --  12.0 16.9*   RBC  --  2.23*  --   --  2.73* 3.36*   HGB 9.4* 6.4* 7.0*   < > 7.7* 9.6*   HCT 28.8* 18.8* 20.7*   < > 22.5* 28.4*   PLT  --  91*  --   --  150 157   GRANS  --  76*  --   --  80* 88*   LYMPH  --  16*  --   --  9* 6*   EOS  --  1  --   --  0 0    < > = values in this interval not displayed. Cardiac Enzymes No results for input(s): CPK, CKND1, EDGARDO in the last 72 hours.     No lab exists for component: CKRMB, TROIP   Coagulation Recent Labs     09/14/22  0400 09/13/22  1237   PTP 15.0 15.9*   INR 1.1 1.2   APTT 25.7 27.1       Lipid Panel Lab Results   Component Value Date/Time    Cholesterol, total 153 07/13/2016 08:05 AM    HDL Cholesterol 58 07/13/2016 08:05 AM    LDL, calculated 64.6 07/13/2016 08:05 AM    VLDL, calculated 30.4 07/13/2016 08:05 AM    Triglyceride 152 (H) 07/13/2016 08:05 AM    CHOL/HDL Ratio 2.6 07/13/2016 08:05 AM      BNP No results for input(s): BNPP in the last 72 hours.    Liver Enzymes Recent Labs     09/14/22  0400   TP 4.7*   ALB 2.8*   AP 39*      Thyroid Studies Lab Results   Component Value Date/Time    TSH 1.66 09/13/2022 05:20 AM        Procedures/imaging: see electronic medical records for all procedures/Xrays and details which were not copied into this note but were reviewed prior to creation of Plan

## 2022-09-14 NOTE — PROGRESS NOTES
@2000 pt taken over awake alert oriented x 3 on 2L/nc denies pain at present remains on Levophed drip. Skin glue to abdomen intact, HOMER drain intact and charged with minimal drainage at present. Wilkerson remains in-situ draining pink urine. Assessment done and charted in appropriate flow sheets. Nursing management  continues. @0574 pt requested something to assist her with sleeping. Dr Javi Vargas was called and updated melatonin administered as requested and ordered. Nursing observation continues. @0000 pt reassessed no changes care continues. @0400 reassessment completed care continues. @1857 Bedside and Verbal shift change report given to V[employer]avo[employer]DeRev (oncoming nurse) by Asuncion Anthony (offgoing nurse). Report included the following information SBAR, Kardex, Intake/Output, MAR, Recent Results, Med Rec Status, and Alarm Parameters .

## 2022-09-14 NOTE — PROGRESS NOTES
D/c plan: Home w/ family assistance. Family to transport. CM met w/ pt at bedside. Confirmed information on pt's face sheet. Pt is independent in all ADLs and IDLs. Confirmed pt's family will transport and pt's family will assist in her recovery. No CM d/c needs identified. CM will continue to follow. Reason for Admission:  TOTAL ABDOMINAL HYSTERECTOMY, RIGHT URETERAL NEOCYSTOSTOMY WITH PSOAS HITCH WITH REPAIR OF BLADDER INJURY/ lysis of adhesions                     RUR Score:        9%             Plan for utilizing home health:      NO    PCP: First and Last name:  Karin Rosa MD     Name of Practice:    Are you a current patient: Yes/No:    Approximate date of last visit:    Can you participate in a virtual visit with your PCP:                     Current Advanced Directive/Advance Care Plan: Full Code      Healthcare Decision Maker:   Click here to complete [address] including selection of the Healthcare Decision Maker Relationship (ie \"Primary\")             Primary Decision Maker: Nolberto Castellano - Spouse - [phone]                  Transition of Care Plan:         home w/ family assistance. Care Management Interventions  PCP Verified by CM: Yes (Dr. Soraya Lucio. )  Mode of Transport at Discharge:  Other (see comment) (family to transport. )  Transition of Care Consult (CM Consult): Discharge Planning (home w/ family assistance. )  Discharge Durable Medical Equipment: No  Physical Therapy Consult: No  Occupational Therapy Consult: No  Speech Therapy Consult: No  Support Systems: Spouse/Significant Other, Child(sergio), Other Family Member(s)  Confirm Follow Up Transport: Family  The Plan for Transition of Care is Related to the Following Treatment Goals : home w/ family assistance  The Patient and/or Patient Representative was Provided with a Choice of Provider and Agrees with the Discharge Plan?: Yes (Pt is alert and oriented.)  Freedom of Choice List was Provided with Basic Dialogue that Supports the Patient's Individualized Plan of Care/Goals, Treatment Preferences and Shares the Quality Data Associated with the Providers?:  (n/a)  Discharge Location  Patient Expects to be Discharged to[de-identified] Home with family assistance

## 2022-09-14 NOTE — OP NOTES
[facility] [place]  OPERATIVE REPORT    Name:  Ray Garcia  MR#:   [id number]  :  1975  ACCOUNT #:  [de-identified]  DATE OF SERVICE:  2022    PREOPERATIVE DIAGNOSES:  Pelvic pain, fibroid uterus. POSTOPERATIVE DIAGNOSES:  Pelvic pain, fibroid uterus, massive adhesions. PROCEDURES PERFORMED:  Total abdominal hysterectomy, extensive lysis of adhesions, right ureteroneocystostomy with psoas hitch, as per Dr. Juno Moore report. SURGEON:  Mervat Riddle MD    ASSISTANT:  As per OR record. ANESTHESIA:  General.    COMPLICATIONS:  Right ureteral ligation and fundal cystotomy of the bladder. SPECIMENS REMOVED:  uterus    IMPLANTS:  None. ESTIMATED BLOOD LOSS:  2000 mL. FINDINGS:  Large, wide, about 20 cm in width x 10 cm in height fibroid uterus adherent to the cul-de-sac, adherent to right sidewall, adherent to left sidewall, normal left ovary. PROCEDURE:  The patient was taken to the operating room, was prepped and draped in the decubitus position when an adequate level of general anesthesia was obtained. A Pfannenstiel incision was made through a previous scar, carried down through the fascia, which was excised and carried both superiorly and laterally using Bhakta scissors. Nicole New Stanton clamps were used to dissect the fascia away from the anterior rectus muscle, which was divided sharp on dissection until the peritoneal cavity was accomplished. An exam under anesthesia revealed a very large, wide fibroid uterus, about 15-week size in height, but encompasses the entire pelvis. This was completely adherent down in the cul-de-sac and adherent to the right sidewall, also adherent to the left sidewall. There were adhesions as well that were dissected free off of the abdominal wall and the bladder. The incision was extended and the bowel pushed up out of the operative field using lap sponges. Tedious lysis of adhesions was accomplished.   The round ligament was able to be identified on the left side, and this was ligated with 0 Vicryl suture x2 and excised. Eventually after lysis of adhesions on this side, the utero-ovarian ligament was able to be identified, and this was ligated with 0 Vicryl suture x2. The ureter could be felt on this side, deep down in the operative field, away from the operative field. Dr. Melvin Mendes was called in for assistance and adhesiolysis for this patent. During the course of the operation, we were able to get into the right sidewall and we were able to ligate adhesions, ligating bleeders along the way. We were able to free this up enough that we could get at the uterine vessels on the right side, which were suture ligated with 0 Vicryl suture. The bladder was attempted to be extracted off of the uterus and the cervix could be palpated. Once we entered the vagina on this side, a Aspen scissors was used to excise the uterus and sent to Pathology. The apex of the vagina was then closed with 0 Vicryl figure-of-eight sutures, these were tagged. At this point, it was evident that we had made a fundal cystotomy in the bladder, and it appeared that the right ureter most likely was resected as well. Because of the extensive large fibroid uterus and the amount of adhesions, estimated blood loss during the procedure was 2000 mL. Urology was called in to evaluate and repair the bladder and ureter, which was dictated in a separate operative report by Dr. Gabriela Foley. Copious irrigation and extensive attention to bleeders with ligation was accomplished prior to the urological procedure. Once the urological procedure was complete, the patient had been given 2 units of packed red blood cells intraoperatively, 500 mL of albumin, and 4 L of fluid. She had some problems keeping her blood pressure, so she was started on a Levophed drip as per Anesthesia report, which was subsequently discontinued after the operation.   After Urology was done with their portion of the operation, the fascia was then closed with 0 Vicryl running suture. The subcutaneous tissues were irrigated, hemostasis noted, and were approximated with 0 Vicryl interrupted sutures. The skin was then closed with Insorb staples. As stated, the estimated blood loss for the procedure was 2000 mL. Drains as per Dr. Johnny James operative report including Wilkerson and HOMER drain. The patient was extubated in the operating room and was returned to the recovery room in guarded condition, on Levophed drip, with hemoglobin and hematocrit pending for the recovery room.       Blanquita Archuleta MD      [id number]  D:  09/13/2022 15:40  T:  09/14/2022 1:07  JOB #:  [id number]  CC:  MD Saman Martinez MD

## 2022-09-14 NOTE — PROGRESS NOTES
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 pleural effusion. BONY THORAX AND SOFT TISSUES: No acute abnormality. _______________    Impression  No acute findings in the chest.    * * *           Please note: Voice-recognition software may have been used to generate this report, which may have resulted in some phonetic-based errors in grammar and contents. Even though attempts were made to correct all the mistakes, some may have been missed, and remained in the body of the document.       Shen Mcmahan MD  9/14/2022

## 2022-09-14 NOTE — PROGRESS NOTES
Pr resting did not disturb  Labs and chart reviewed  Copious urine output  Will revisit later today.

## 2022-09-15 LAB
ABO + RH BLD: NORMAL
ALBUMIN SERPL-MCNC: 3 G/DL (ref 3.4–5)
ALBUMIN/GLOB SERPL: 1.1 {RATIO} (ref 0.8–1.7)
ALP SERPL-CCNC: 53 U/L (ref 45–117)
ALT SERPL-CCNC: 31 U/L (ref 13–56)
ANION GAP SERPL CALC-SCNC: 5 MMOL/L (ref 3–18)
APTT PPP: 28.3 SEC (ref 23–36.4)
AST SERPL-CCNC: 37 U/L (ref 10–38)
BASOPHILS # BLD: 0 K/UL (ref 0–0.1)
BASOPHILS # BLD: 0 K/UL (ref 0–0.1)
BASOPHILS NFR BLD: 0 % (ref 0–2)
BASOPHILS NFR BLD: 0 % (ref 0–2)
BILIRUB SERPL-MCNC: 0.6 MG/DL (ref 0.2–1)
BLD PROD TYP BPU: NORMAL
BLOOD GROUP ANTIBODIES SERPL: NORMAL
BPU ID: NORMAL
BUN SERPL-MCNC: 7 MG/DL (ref 7–18)
BUN/CREAT SERPL: 13 (ref 12–20)
CA-I SERPL-SCNC: 1.09 MMOL/L (ref 1.12–1.32)
CA-I SERPL-SCNC: 1.15 MMOL/L (ref 1.12–1.32)
CALCIUM SERPL-MCNC: 8.3 MG/DL (ref 8.5–10.1)
CHLORIDE SERPL-SCNC: 107 MMOL/L (ref 100–111)
CO2 SERPL-SCNC: 27 MMOL/L (ref 21–32)
CREAT SERPL-MCNC: 0.53 MG/DL (ref 0.6–1.3)
CROSSMATCH RESULT,%XM: NORMAL
DIFFERENTIAL METHOD BLD: ABNORMAL
DIFFERENTIAL METHOD BLD: ABNORMAL
EOSINOPHIL # BLD: 0.2 K/UL (ref 0–0.4)
EOSINOPHIL # BLD: 0.2 K/UL (ref 0–0.4)
EOSINOPHIL NFR BLD: 2 % (ref 0–5)
EOSINOPHIL NFR BLD: 3 % (ref 0–5)
ERYTHROCYTE [DISTWIDTH] IN BLOOD BY AUTOMATED COUNT: 15.7 % (ref 11.6–14.5)
ERYTHROCYTE [DISTWIDTH] IN BLOOD BY AUTOMATED COUNT: 15.9 % (ref 11.6–14.5)
FIBRINOGEN PPP-MCNC: 658 MG/DL (ref 210–451)
GLOBULIN SER CALC-MCNC: 2.7 G/DL (ref 2–4)
GLUCOSE BLD STRIP.AUTO-MCNC: 103 MG/DL (ref 70–110)
GLUCOSE BLD STRIP.AUTO-MCNC: 107 MG/DL (ref 70–110)
GLUCOSE BLD STRIP.AUTO-MCNC: 80 MG/DL (ref 70–110)
GLUCOSE BLD STRIP.AUTO-MCNC: 86 MG/DL (ref 70–110)
GLUCOSE SERPL-MCNC: 103 MG/DL (ref 74–99)
HCT VFR BLD AUTO: 23.4 % (ref 35–45)
HCT VFR BLD AUTO: 32.2 % (ref 35–45)
HGB BLD-MCNC: 10.3 G/DL (ref 12–16)
HGB BLD-MCNC: 7.6 G/DL (ref 12–16)
IMM GRANULOCYTES # BLD AUTO: 0.1 K/UL (ref 0–0.04)
IMM GRANULOCYTES # BLD AUTO: 0.1 K/UL (ref 0–0.04)
IMM GRANULOCYTES NFR BLD AUTO: 1 % (ref 0–0.5)
IMM GRANULOCYTES NFR BLD AUTO: 1 % (ref 0–0.5)
INR PPP: 1.1 (ref 0.8–1.2)
LIPASE SERPL-CCNC: 87 U/L (ref 73–393)
LYMPHOCYTES # BLD: 1.3 K/UL (ref 0.9–3.6)
LYMPHOCYTES # BLD: 1.4 K/UL (ref 0.9–3.6)
LYMPHOCYTES NFR BLD: 15 % (ref 21–52)
LYMPHOCYTES NFR BLD: 16 % (ref 21–52)
MCH RBC QN AUTO: 28.2 PG (ref 24–34)
MCH RBC QN AUTO: 28.5 PG (ref 24–34)
MCHC RBC AUTO-ENTMCNC: 32 G/DL (ref 31–37)
MCHC RBC AUTO-ENTMCNC: 32.5 G/DL (ref 31–37)
MCV RBC AUTO: 87.6 FL (ref 78–100)
MCV RBC AUTO: 88.2 FL (ref 78–100)
MONOCYTES # BLD: 0.4 K/UL (ref 0.05–1.2)
MONOCYTES # BLD: 0.4 K/UL (ref 0.05–1.2)
MONOCYTES NFR BLD: 5 % (ref 3–10)
MONOCYTES NFR BLD: 5 % (ref 3–10)
NEUTS SEG # BLD: 6.4 K/UL (ref 1.8–8)
NEUTS SEG # BLD: 6.8 K/UL (ref 1.8–8)
NEUTS SEG NFR BLD: 76 % (ref 40–73)
NEUTS SEG NFR BLD: 76 % (ref 40–73)
NRBC # BLD: 0 K/UL (ref 0–0.01)
NRBC # BLD: 0 K/UL (ref 0–0.01)
NRBC BLD-RTO: 0 PER 100 WBC
NRBC BLD-RTO: 0 PER 100 WBC
PHOSPHATE SERPL-MCNC: 3.5 MG/DL (ref 2.5–4.9)
PLATELET # BLD AUTO: 120 K/UL (ref 135–420)
PLATELET # BLD AUTO: 154 K/UL (ref 135–420)
PMV BLD AUTO: 9.5 FL (ref 9.2–11.8)
PMV BLD AUTO: 9.6 FL (ref 9.2–11.8)
POTASSIUM SERPL-SCNC: 4.1 MMOL/L (ref 3.5–5.5)
PROT SERPL-MCNC: 5.7 G/DL (ref 6.4–8.2)
PROTHROMBIN TIME: 14.3 SEC (ref 11.5–15.2)
RBC # BLD AUTO: 2.67 M/UL (ref 4.2–5.3)
RBC # BLD AUTO: 3.65 M/UL (ref 4.2–5.3)
SODIUM SERPL-SCNC: 139 MMOL/L (ref 136–145)
SPECIMEN EXP DATE BLD: NORMAL
STATUS OF UNIT,%ST: NORMAL
UNIT DIVISION, %UDIV: 0
WBC # BLD AUTO: 8.4 K/UL (ref 4.6–13.2)
WBC # BLD AUTO: 8.9 K/UL (ref 4.6–13.2)

## 2022-09-15 PROCEDURE — 84100 ASSAY OF PHOSPHORUS: CPT

## 2022-09-15 PROCEDURE — 65270000046 HC RM TELEMETRY

## 2022-09-15 PROCEDURE — 85025 COMPLETE CBC W/AUTO DIFF WBC: CPT

## 2022-09-15 PROCEDURE — 82962 GLUCOSE BLOOD TEST: CPT

## 2022-09-15 PROCEDURE — 74011250636 HC RX REV CODE- 250/636: Performed by: INTERNAL MEDICINE

## 2022-09-15 PROCEDURE — 85384 FIBRINOGEN ACTIVITY: CPT

## 2022-09-15 PROCEDURE — P9047 ALBUMIN (HUMAN), 25%, 50ML: HCPCS | Performed by: INTERNAL MEDICINE

## 2022-09-15 PROCEDURE — 85730 THROMBOPLASTIN TIME PARTIAL: CPT

## 2022-09-15 PROCEDURE — 83690 ASSAY OF LIPASE: CPT

## 2022-09-15 PROCEDURE — 74011000258 HC RX REV CODE- 258: Performed by: INTERNAL MEDICINE

## 2022-09-15 PROCEDURE — 74011250637 HC RX REV CODE- 250/637: Performed by: OBSTETRICS & GYNECOLOGY

## 2022-09-15 PROCEDURE — 74011250637 HC RX REV CODE- 250/637: Performed by: INTERNAL MEDICINE

## 2022-09-15 PROCEDURE — 74011000250 HC RX REV CODE- 250: Performed by: INTERNAL MEDICINE

## 2022-09-15 PROCEDURE — 85610 PROTHROMBIN TIME: CPT

## 2022-09-15 PROCEDURE — 77010033678 HC OXYGEN DAILY

## 2022-09-15 PROCEDURE — 80053 COMPREHEN METABOLIC PANEL: CPT

## 2022-09-15 PROCEDURE — 36415 COLL VENOUS BLD VENIPUNCTURE: CPT

## 2022-09-15 PROCEDURE — 82330 ASSAY OF CALCIUM: CPT

## 2022-09-15 RX ORDER — MORPHINE SULFATE 2 MG/ML
2 INJECTION, SOLUTION INTRAMUSCULAR; INTRAVENOUS
Status: DISPENSED | OUTPATIENT
Start: 2022-09-15 | End: 2022-09-16

## 2022-09-15 RX ORDER — NORTRIPTYLINE HYDROCHLORIDE 50 MG/1
50 CAPSULE ORAL 2 TIMES DAILY
COMMUNITY

## 2022-09-15 RX ORDER — CALCIUM GLUCONATE 20 MG/ML
2 INJECTION, SOLUTION INTRAVENOUS ONCE
Status: COMPLETED | OUTPATIENT
Start: 2022-09-15 | End: 2022-09-15

## 2022-09-15 RX ORDER — HYOSCYAMINE SULFATE 0.12 MG/1
.125-.25 TABLET SUBLINGUAL
Status: DISPENSED | OUTPATIENT
Start: 2022-09-15 | End: 2022-09-16

## 2022-09-15 RX ADMIN — PHENAZOPYRIDINE HYDROCHLORIDE 200 MG: 100 TABLET ORAL at 09:10

## 2022-09-15 RX ADMIN — CALCIUM GLUCONATE 2 G: 20 INJECTION, SOLUTION INTRAVENOUS at 03:22

## 2022-09-15 RX ADMIN — HYOSCYAMINE SULFATE 0.25 MG: 0.12 TABLET ORAL; SUBLINGUAL at 23:18

## 2022-09-15 RX ADMIN — MORPHINE SULFATE 2 MG: 2 INJECTION, SOLUTION INTRAMUSCULAR; INTRAVENOUS at 20:14

## 2022-09-15 RX ADMIN — SODIUM CHLORIDE, PRESERVATIVE FREE 10 ML: 5 INJECTION INTRAVENOUS at 20:12

## 2022-09-15 RX ADMIN — HYDROCODONE BITARTRATE AND ACETAMINOPHEN 1 TABLET: 10; 325 TABLET ORAL at 23:22

## 2022-09-15 RX ADMIN — ALBUMIN (HUMAN) 12.5 G: 0.25 INJECTION, SOLUTION INTRAVENOUS at 20:11

## 2022-09-15 RX ADMIN — NORTRIPTYLINE HYDROCHLORIDE 10 MG: 10 CAPSULE ORAL at 20:11

## 2022-09-15 RX ADMIN — LEVOTHYROXINE SODIUM 50 MCG: 0.05 TABLET ORAL at 06:29

## 2022-09-15 RX ADMIN — PHENAZOPYRIDINE HYDROCHLORIDE 200 MG: 100 TABLET ORAL at 14:07

## 2022-09-15 RX ADMIN — CLONAZEPAM 0.5 MG: 0.5 TABLET ORAL at 08:45

## 2022-09-15 RX ADMIN — SODIUM CHLORIDE, PRESERVATIVE FREE 10 ML: 5 INJECTION INTRAVENOUS at 14:00

## 2022-09-15 RX ADMIN — HYOSCYAMINE SULFATE 0.25 MG: 0.12 TABLET ORAL; SUBLINGUAL at 18:59

## 2022-09-15 RX ADMIN — PHENAZOPYRIDINE HYDROCHLORIDE 200 MG: 100 TABLET ORAL at 18:03

## 2022-09-15 RX ADMIN — CLONAZEPAM 0.5 MG: 0.5 TABLET ORAL at 14:07

## 2022-09-15 RX ADMIN — ALBUMIN (HUMAN) 12.5 G: 0.25 INJECTION, SOLUTION INTRAVENOUS at 08:45

## 2022-09-15 RX ADMIN — SODIUM CHLORIDE, PRESERVATIVE FREE 10 ML: 5 INJECTION INTRAVENOUS at 06:30

## 2022-09-15 RX ADMIN — HYOSCYAMINE SULFATE 0.25 MG: 0.12 TABLET ORAL; SUBLINGUAL at 15:00

## 2022-09-15 RX ADMIN — VITAM B12 100 MCG: 100 TAB at 09:10

## 2022-09-15 RX ADMIN — HYOSCYAMINE SULFATE 375 MCG: 0.38 TABLET, EXTENDED RELEASE ORAL at 10:00

## 2022-09-15 RX ADMIN — HYOSCYAMINE SULFATE 375 MCG: 0.38 TABLET, EXTENDED RELEASE ORAL at 03:22

## 2022-09-15 RX ADMIN — CEFTRIAXONE 1 G: 1 INJECTION, POWDER, FOR SOLUTION INTRAMUSCULAR; INTRAVENOUS at 09:24

## 2022-09-15 RX ADMIN — CLONAZEPAM 0.5 MG: 0.5 TABLET ORAL at 20:11

## 2022-09-15 RX ADMIN — Medication: at 09:00

## 2022-09-15 NOTE — PROGRESS NOTES
Alert and oriented x4, assessments and Vs completed, pt has reports of pain, PRN provided and effective .

## 2022-09-15 NOTE — PROGRESS NOTES
Hospitalist Progress Note    Patient: Suleiman Kinsey MRN: [id number]  CSN: [id number]    YOB: 1975  Age: 40 y.o. Sex: female    DOA: 9/12/2022 LOS:  LOS: 3 days                Assessment/Plan     Patient Active Problem List   Diagnosis Code    Abdominal pain R10.9    Fibroid uterus D25.9    S/P abdominal hysterectomy Z90.710    Hypovolemic shock (HCC) R57.1    Sinus tachycardia R00.0    Bladder injury S37.20XA    Hematuria R31.9    Bladder spasm N32.89        Chief complaint :  40 y.o. female with past medical history significant for Fibromyalgia, Bowel obstruction, uterine fibroids, Hypothyroidism presents to hospital for elective hysterectomy And lysis of adhesion, Found to have large uterus unfortunately right ureter transensected with midline bladder injury. Patient is post posoas hitch employed and ureter was reimplanted under no tension  Patient had EBL of 2000 cc    Post op developed hypotension and was transferred to ICU     Complains of lower abdominal pain, spasm    CRITICAL CARE PLAN    Resp -   Stable respiratory status. Will use oxygen by NC as needed. ID -   ANTIBIOTICS empiric zosyn now stopped. CVS - Monitor HD. Hypovolemic shock - off levophed  IVF and albumin    Heme/onc - Follow H&H, plts. INR. Acute blood loss anemia -   Transfuse to keep Hb>7    Renal - Trend BUN, Cr, follow I/O, bunrette in place. Check and replace Mg, K, phos. Monitor urine output     Endocrine -  Follow FSG    Neuro/ Pain/ Sedation -   Pain control per primary    GI - regular diet     -  Post op management per primary and urology. PT/OT per primary    Prophylaxis - DVT: scd, GI: protonix     Discussed with Dr. Eran Malave, intensivist. At this point they are following. Not much to offer from medical standpoint. Will sign off, please call if needed. Review of systems  General: No fevers or chills. Cardiovascular: No chest pain or pressure. No palpitations.    Pulmonary: No shortness of breath. Gastrointestinal: No nausea, vomiting. Physical Exam:  General: Awake, cooperative, no acute distress    HEENT: NC, Atraumatic. PERRLA, anicteric sclerae. Lungs: CTA Bilaterally. No Wheezing/Rhonchi/Rales. Heart:  S1 S2,  No murmur, No Rubs, No Gallops  Abdomen: Post op abdomen   Extremities: No c/c/e  Psych:   Not anxious or agitated. Neurologic:  No acute neurological deficit. Vital signs/Intake and Output:  Visit Vitals  /74   Pulse 83   Temp 97.7 °F (36.5 °C)   Resp 10   Ht 5' 4\" (1.626 m)   Wt 99.8 kg (220 lb)   SpO2 96%   BMI 37.76 kg/m²     Current Shift:  No intake/output data recorded. Last three shifts:  09/13 1901 - 09/15 0700  In: 846 [P.O.:240; I.V.:246]  Out: 8050 [Urine:8000; Drains:50]            Labs: Results:       Chemistry Recent Labs     09/15/22  0105 09/14/22  1350 09/14/22  0400 09/13/22  0520   *  --  118* 140*     --  141 138   K 4.1 3.6 3.8 4.5     --  110 110   CO2 27  --  27 20*   BUN 7  --  9 15   CREA 0.53*  --  0.58* 0.98   CA 8.3*  --  7.4* 7.1*   AGAP 5  --  4 8   BUCR 13  --  16 15   AP 53  --  39* 37*   TP 5.7*  --  4.7* 4.5*   ALB 3.0*  --  2.8* 2.6*   GLOB 2.7  --  1.9* 1.9*   AGRAT 1.1  --  1.5 1.4      CBC w/Diff Recent Labs     09/15/22  0105 09/14/22  1345 09/14/22  0400 09/13/22  1237 09/13/22  0520   WBC 8.4  --  6.2  --  12.0   RBC 2.67*  --  2.23*  --  2.73*   HGB 7.6* 9.4* 6.4*   < > 7.7*   HCT 23.4* 28.8* 18.8*   < > 22.5*   *  --  91*  --  150   GRANS 76*  --  76*  --  80*   LYMPH 16*  --  16*  --  9*   EOS 2  --  1  --  0    < > = values in this interval not displayed. Cardiac Enzymes No results for input(s): CPK, CKND1, EDGARDO in the last 72 hours.     No lab exists for component: CKRMB, TROIP   Coagulation Recent Labs     09/15/22  0105 09/14/22  0400   PTP 14.3 15.0   INR 1.1 1.1   APTT 28.3 25.7       Lipid Panel Lab Results   Component Value Date/Time    Cholesterol, total 153 07/13/2016 08:05 AM    HDL Cholesterol 58 07/13/2016 08:05 AM    LDL, calculated 64.6 07/13/2016 08:05 AM    VLDL, calculated 30.4 07/13/2016 08:05 AM    Triglyceride 152 (H) 07/13/2016 08:05 AM    CHOL/HDL Ratio 2.6 07/13/2016 08:05 AM      BNP No results for input(s): BNPP in the last 72 hours.    Liver Enzymes Recent Labs     09/15/22  0105   TP 5.7*   ALB 3.0*   AP 53      Thyroid Studies Lab Results   Component Value Date/Time    TSH 1.66 09/13/2022 05:20 AM        Procedures/imaging: see electronic medical records for all procedures/Xrays and details which were not copied into this note but were reviewed prior to creation of Plan

## 2022-09-15 NOTE — PROGRESS NOTES
Progress Note    Patient: Kumar Bejarano MRN: [id number]  SSN: [id number]    YOB: 1975  Age: 40 y.o. Sex: female    ROOM: 110/01  Subjective:     Postop Day: 2     The patient feels tired. The patient denies emotional concerns. The patient is not ambulating well. The patient  tolerating a normal diet. Flatus has been passed. BM done denies. Objective:        Vitals table based on RN flowsheet:  Patient Vitals for the past 4 hrs:   Temp   09/15/22 0400 98.1 °F (36.7 °C)    No data found. No data found. No data found.            Patient Vitals for the past 48 hrs:   BP Temp Pulse Resp SpO2 Height Weight   09/15/22 0400 -- 98.1 °F (36.7 °C) -- -- -- -- --   09/15/22 0130 (!) 85/48 -- 91 13 91 % -- --   09/15/22 0125 (!) 85/49 -- 94 12 92 % -- --   09/15/22 0115 (!) 97/53 -- 91 13 92 % -- --   09/15/22 0100 (!) 97/55 -- 91 13 91 % -- --   09/15/22 0045 (!) 105/54 -- 88 14 93 % -- --   09/15/22 0030 100/62 -- 97 18 92 % -- --   09/15/22 0021 -- -- 92 14 94 % -- --   09/15/22 0015 (!) 101/55 -- 92 13 (!) 89 % -- --   09/15/22 0000 104/60 97.8 °F (36.6 °C) 91 13 91 % -- --   09/14/22 2345 103/60 -- 92 14 94 % -- --   09/14/22 2315 (!) 122/57 -- 93 14 91 % -- --   09/14/22 2300 118/66 97.8 °F (36.6 °C) 97 15 93 % -- --   09/14/22 2245 129/72 -- 91 13 94 % -- --   09/14/22 2230 125/69 -- 92 16 93 % -- --   09/14/22 2215 138/65 -- 91 9 94 % -- --   09/14/22 2200 133/79 -- (!) 105 22 93 % -- --   09/14/22 2145 125/67 -- (!) 107 10 94 % -- --   09/14/22 2130 123/67 -- 100 12 93 % -- --   09/14/22 2115 125/69 -- (!) 104 19 94 % -- --   09/14/22 2100 (!) 142/72 -- (!) 102 14 93 % -- --   09/14/22 2045 130/63 -- 98 11 93 % -- --   09/14/22 2030 135/72 -- (!) 101 14 93 % -- --   09/14/22 2015 128/66 -- (!) 104 15 93 % -- --   09/14/22 2000 136/77 98.4 °F (36.9 °C) 95 11 92 % -- --   09/14/22 1945 130/68 -- 97 11 93 % -- --   09/14/22 1930 130/79 -- 99 13 93 % -- --   09/14/22 1915 124/66 -- 97 11 93 % -- --   09/14/22 1900 136/78 -- 98 12 94 % -- --   09/14/22 1845 -- -- 98 14 94 % -- --   09/14/22 1830 131/78 -- (!) 104 14 95 % -- --   09/14/22 1815 126/64 -- (!) 110 15 93 % -- --   09/14/22 1800 122/84 -- (!) 113 14 -- -- --   09/14/22 1745 112/67 -- (!) 114 11 93 % -- --   09/14/22 1730 107/66 -- (!) 101 13 -- -- --   09/14/22 1715 108/66 -- (!) 104 14 95 % -- --   09/14/22 1701 -- -- (!) 104 18 92 % -- --   09/14/22 1700 106/63 -- (!) 102 20 -- -- --   09/14/22 1646 122/71 -- (!) 106 14 -- -- --   09/14/22 1645 -- -- (!) 106 16 -- -- --   09/14/22 1631 121/66 -- (!) 101 20 -- -- --   09/14/22 1630 -- -- (!) 101 20 -- -- --   09/14/22 1626 -- -- (!) 103 19 90 % -- --   09/14/22 1616 116/70 -- (!) 101 17 -- -- --   09/14/22 1615 -- -- (!) 103 23 -- -- --   09/14/22 1606 118/64 -- 96 13 -- -- --   09/14/22 1601 -- -- 96 18 93 % -- --   09/14/22 1600 -- -- (!) 105 19 93 % -- --   09/14/22 1556 -- -- 98 19 92 % -- --   09/14/22 1546 123/66 -- 99 17 -- -- --   09/14/22 1545 -- -- (!) 101 16 93 % -- --   09/14/22 1536 -- -- (!) 101 16 93 % -- --   09/14/22 1531 -- 98.5 °F (36.9 °C) (!) 101 15 93 % -- --   09/14/22 1530 (!) 103/56 -- (!) 102 19 93 % -- --   09/14/22 1526 -- -- (!) 102 19 93 % -- --   09/14/22 1516 -- -- (!) 111 19 -- -- --   09/14/22 1515 126/72 -- (!) 104 17 94 % -- --   09/14/22 1506 -- -- (!) 108 17 -- -- --   09/14/22 1501 -- -- (!) 109 13 -- -- --   09/14/22 1500 134/72 -- (!) 108 14 93 % -- --   09/14/22 1456 -- -- (!) 109 17 93 % -- --   09/14/22 1446 -- -- (!) 108 17 94 % -- --   09/14/22 1445 130/76 -- (!) 108 16 94 % -- --   09/14/22 1436 -- -- (!) 109 16 94 % -- --   09/14/22 1431 -- -- (!) 108 15 95 % -- --   09/14/22 1430 117/67 -- (!) 112 16 95 % -- --   09/14/22 1426 -- -- (!) 109 16 94 % -- --   09/14/22 1416 -- -- (!) 109 18 92 % -- --   09/14/22 1415 131/71 -- (!) 109 19 93 % -- --   09/14/22 1406 -- -- (!) 110 16 94 % -- --   09/14/22 1401 -- -- (!) 110 17 94 % -- --   09/14/22 1400 129/79 -- (!) 110 16 97 % -- --   09/14/22 1356 139/79 -- (!) 111 14 94 % -- --   09/14/22 1346 -- -- (!) 107 14 96 % -- --   09/14/22 1345 -- -- (!) 107 16 94 % -- --   09/14/22 1336 -- -- (!) 111 16 96 % -- --   09/14/22 1334 134/75 -- (!) 112 14 94 % -- --   09/14/22 1331 -- -- (!) 109 17 95 % -- --   09/14/22 1330 -- -- (!) 111 15 97 % -- --   09/14/22 1326 -- -- (!) 109 11 94 % -- --   09/14/22 1316 -- -- (!) 112 13 94 % -- --   09/14/22 1315 126/71 -- (!) 112 12 94 % -- --   09/14/22 1306 -- -- (!) 109 15 95 % -- --   09/14/22 1304 131/77 -- (!) 107 13 97 % -- --   09/14/22 1301 -- -- (!) 109 11 97 % -- --   09/14/22 1300 -- -- (!) 109 18 97 % -- --   09/14/22 1256 -- -- (!) 110 17 98 % -- --   09/14/22 1246 -- -- (!) 109 (!) 32 97 % -- --   09/14/22 1245 135/80 -- (!) 111 12 94 % -- --   09/14/22 1236 (!) 126/91 -- (!) 112 16 97 % -- --   09/14/22 1234 101/80 98.3 °F (36.8 °C) (!) 116 20 98 % -- --   09/14/22 1231 -- -- (!) 115 11 97 % -- --   09/14/22 1230 -- -- (!) 116 10 97 % -- --   09/14/22 1226 -- -- (!) 115 16 97 % -- --   09/14/22 1216 -- -- (!) 113 9 99 % -- --   09/14/22 1215 135/82 -- (!) 112 12 98 % -- --   09/14/22 1206 -- -- (!) 112 12 97 % -- --   09/14/22 1204 126/83 -- (!) 111 12 97 % -- --   09/14/22 1201 -- -- (!) 112 11 96 % -- --   09/14/22 1200 -- -- (!) 115 16 97 % -- --   09/14/22 1156 -- -- (!) 110 11 96 % -- --   09/14/22 1146 -- -- (!) 117 16 97 % -- --   09/14/22 1145 106/63 -- (!) 109 9 96 % -- --   09/14/22 1136 -- -- (!) 111 9 95 % -- --   09/14/22 1134 133/74 -- (!) 110 9 95 % -- --   09/14/22 1131 -- -- (!) 119 22 95 % -- --   09/14/22 1130 -- -- (!) 113 12 95 % -- --   09/14/22 1126 -- -- (!) 106 9 95 % -- --   09/14/22 1115 137/81 -- (!) 114 11 95 % -- --   09/14/22 1106 -- -- (!) 105 (!) 7 94 % -- --   09/14/22 1104 92/69 -- (!) 105 10 95 % -- --   09/14/22 1101 -- -- (!) 101 9 95 % -- --   09/14/22 1100 -- -- (!) 102 8 94 % -- --   09/14/22 1056 -- -- (!) 103 9 94 % -- -- 09/14/22 1046 -- -- (!) 106 12 94 % -- --   09/14/22 1045 (!) 145/75 -- (!) 104 10 95 % -- --   09/14/22 1036 -- -- (!) 105 13 95 % -- --   09/14/22 1034 138/66 -- 97 16 95 % -- --   09/14/22 1031 -- -- (!) 107 15 96 % -- --   09/14/22 1030 -- -- (!) 101 14 95 % -- --   09/14/22 1026 -- -- 99 14 95 % -- --   09/14/22 1019 131/68 -- (!) 102 17 95 % -- --   09/14/22 1016 -- -- (!) 103 16 96 % -- --   09/14/22 1015 -- -- (!) 106 17 96 % -- --   09/14/22 1006 -- -- (!) 112 20 94 % -- --   09/14/22 1004 125/68 -- (!) 109 18 96 % -- --   09/14/22 1001 -- -- (!) 112 16 96 % -- --   09/14/22 1000 -- -- (!) 110 16 94 % -- --   09/14/22 0956 -- -- (!) 113 14 96 % -- --   09/14/22 0949 134/73 -- (!) 108 12 (!) 88 % -- --   09/14/22 0946 -- -- (!) 105 15 -- -- --   09/14/22 0945 -- -- (!) 103 17 -- -- --   09/14/22 0936 -- -- (!) 105 14 -- -- --   09/14/22 0934 131/71 -- (!) 102 15 -- -- --   09/14/22 0931 -- -- (!) 101 11 -- -- --   09/14/22 0930 -- -- (!) 107 9 -- -- --   09/14/22 0926 -- -- (!) 106 15 -- -- --   09/14/22 0919 130/78 -- (!) 103 13 96 % -- --   09/14/22 0916 -- -- (!) 107 16 96 % -- --   09/14/22 0915 -- -- (!) 104 17 96 % -- --   09/14/22 0906 -- -- 100 12 96 % -- --   09/14/22 0904 110/61 -- 100 11 96 % -- --   09/14/22 0901 -- -- (!) 102 11 96 % -- --   09/14/22 0900 -- -- (!) 101 11 97 % -- --   09/14/22 0856 -- -- 99 12 96 % -- --   09/14/22 0849 136/77 -- (!) 101 10 96 % -- --   09/14/22 0846 -- -- (!) 103 13 96 % -- --   09/14/22 0845 -- -- 99 11 96 % -- --   09/14/22 0836 -- -- 96 9 96 % -- --   09/14/22 0834 128/73 -- 98 9 96 % -- --   09/14/22 0831 -- -- 100 9 96 % -- --   09/14/22 0830 -- -- (!) 101 13 96 % -- --   09/14/22 0826 -- -- 87 11 96 % -- --   09/14/22 0819 128/67 -- (!) 103 15 98 % -- --   09/14/22 0816 -- -- (!) 106 13 97 % -- --   09/14/22 0815 -- -- (!) 101 13 96 % -- --   09/14/22 0806 -- -- 95 11 96 % -- --   09/14/22 0804 126/72 -- 96 10 95 % -- --   09/14/22 0801 -- -- 91 9 96 % -- --   09/14/22 0800 -- -- 97 11 96 % -- --   09/14/22 0756 -- -- 94 11 96 % -- --   09/14/22 0752 -- -- -- -- 97 % -- --   09/14/22 0749 129/69 -- 93 11 96 % -- --   09/14/22 0746 -- -- 94 11 96 % -- --   09/14/22 0745 -- -- 97 11 96 % -- --   09/14/22 0736 -- -- 97 13 99 % -- --   09/14/22 0734 127/70 -- 98 13 97 % -- --   09/14/22 0731 -- -- 96 12 96 % -- --   09/14/22 0730 -- -- 92 12 97 % -- --   09/14/22 0726 -- -- 99 13 96 % -- --   09/14/22 0719 114/69 -- (!) 104 9 97 % -- --   09/14/22 0716 -- -- 87 14 97 % -- --   09/14/22 0715 (!) 97/48 -- 100 15 97 % -- --   09/14/22 0706 -- -- 96 14 97 % -- --   09/14/22 0704 (!) 111/97 -- 91 14 97 % -- --   09/14/22 0701 -- -- 79 14 97 % -- --   09/14/22 0700 -- -- 84 15 97 % -- --   09/14/22 0656 -- -- 83 14 97 % -- --   09/14/22 0649 107/67 -- 92 14 98 % -- --   09/14/22 0646 -- -- 87 15 96 % -- --   09/14/22 0645 -- -- 83 13 96 % -- --   09/14/22 0636 -- -- 94 15 97 % -- --   09/14/22 0634 135/68 -- 90 16 97 % -- --   09/14/22 0631 -- -- 88 12 96 % -- --   09/14/22 0630 -- -- 91 12 96 % -- --   09/14/22 0626 -- -- 94 12 96 % -- --   09/14/22 0619 -- -- 91 12 96 % -- --   09/14/22 0616 -- -- 95 13 96 % -- --   09/14/22 0615 122/64 -- 94 13 96 % -- --   09/14/22 0606 -- -- 93 12 96 % -- --   09/14/22 0604 -- -- 89 14 96 % -- --   09/14/22 0601 -- -- 95 15 96 % -- --   09/14/22 0600 121/65 -- 94 10 96 % -- --   09/14/22 0559 -- 98.2 °F (36.8 °C) -- -- -- -- --   09/14/22 0556 -- -- 98 10 96 % -- --   09/14/22 0549 -- -- 96 (!) 7 96 % -- --   09/14/22 0546 -- -- 97 11 96 % -- --   09/14/22 0545 125/63 -- 98 10 96 % -- --   09/14/22 0536 -- -- 97 8 95 % -- --   09/14/22 0534 -- -- (!) 102 12 95 % -- --   09/14/22 0531 -- -- (!) 101 12 95 % -- --   09/14/22 0530 130/62 -- (!) 102 10 96 % -- --   09/14/22 0526 -- -- (!) 104 15 95 % -- --   09/14/22 0519 -- -- 100 11 95 % -- --   09/14/22 0516 -- -- 97 11 96 % -- --   09/14/22 0515 135/64 -- 100 9 95 % -- --   09/14/22 0506 -- -- 99 9 95 % -- --   09/14/22 0504 -- -- 98 9 95 % -- --   09/14/22 0501 -- -- 100 10 95 % -- --   09/14/22 0500 (!) 125/58 -- (!) 103 10 96 % -- --   09/14/22 0456 -- -- 98 9 95 % -- --   09/14/22 0449 -- -- (!) 102 12 96 % -- --   09/14/22 0446 -- -- 98 11 96 % -- --   09/14/22 0445 131/64 -- 100 12 95 % -- --   09/14/22 0436 -- -- (!) 109 14 95 % -- --   09/14/22 0434 -- -- (!) 105 16 96 % -- --   09/14/22 0431 -- -- (!) 104 8 97 % -- --   09/14/22 0430 117/65 -- (!) 101 11 97 % -- --   09/14/22 0426 -- -- 100 9 97 % -- --   09/14/22 0419 -- -- 99 12 97 % -- --   09/14/22 0416 -- -- (!) 103 (!) 6 97 % -- --   09/14/22 0415 104/72 -- (!) 106 17 97 % -- --   09/14/22 0406 -- -- (!) 103 11 98 % -- --   09/14/22 0404 -- -- 97 13 98 % -- --   09/14/22 0401 -- -- 92 15 98 % -- --   09/14/22 0400 (!) 114/58 -- 93 15 98 % -- --   09/14/22 0356 -- -- 95 15 98 % -- --   09/14/22 0349 -- -- 92 16 98 % -- --   09/14/22 0346 -- -- 91 18 98 % -- --   09/14/22 0345 (!) 107/56 -- 97 15 98 % -- --   09/14/22 0336 -- -- 93 15 98 % -- --   09/14/22 0334 -- -- 95 17 98 % -- --   09/14/22 0331 -- -- 94 14 97 % -- --   09/14/22 0330 (!) 107/44 -- 93 15 97 % -- --   09/14/22 0326 -- -- 94 16 97 % -- --   09/14/22 0319 -- -- 97 13 97 % -- --   09/14/22 0316 -- -- 95 14 97 % -- --   09/14/22 0315 (!) 108/55 -- 95 16 97 % -- --   09/14/22 0306 -- -- (!) 110 20 97 % -- --   09/14/22 0304 -- -- 95 16 98 % -- --   09/14/22 0301 -- -- 95 15 97 % -- --   09/14/22 0300 (!) 103/52 -- (!) 104 15 98 % -- --   09/14/22 0256 -- -- 97 15 98 % -- --   09/14/22 0249 -- -- 95 16 98 % -- --   09/14/22 0246 -- -- 96 15 98 % -- --   09/14/22 0245 (!) 106/55 -- 96 17 98 % -- --   09/14/22 0236 -- -- 92 16 97 % -- --   09/14/22 0234 -- -- 91 15 98 % -- --   09/14/22 0231 -- -- 91 14 97 % -- --   09/14/22 0230 (!) 110/58 -- 89 13 97 % -- --   09/14/22 0226 -- -- 93 15 98 % -- --   09/14/22 0219 -- -- 92 17 97 % -- --   09/14/22 0216 -- -- 94 16 97 % -- --   09/14/22 0215 (!) 112/57 -- 94 17 97 % -- --   09/14/22 0206 -- -- 91 16 97 % -- --   09/14/22 0204 -- -- 91 15 96 % -- --   09/14/22 0201 -- -- 90 17 97 % -- --   09/14/22 0200 115/68 -- 91 15 97 % -- --   09/14/22 0156 -- -- 90 16 97 % -- --   09/14/22 0149 -- -- 90 15 97 % -- --   09/14/22 0146 -- -- 96 15 97 % -- --   09/14/22 0145 112/62 -- 88 15 96 % -- --   09/14/22 0136 -- -- 93 14 97 % -- --   09/14/22 0134 -- -- (!) 102 15 99 % -- --   09/14/22 0131 -- -- 92 14 96 % -- --   09/14/22 0130 114/62 -- 92 14 96 % -- --   09/14/22 0126 -- -- 95 16 96 % -- --   09/14/22 0119 -- -- 94 15 97 % -- --   09/14/22 0116 -- -- 92 15 96 % -- --   09/14/22 0115 118/61 -- 94 14 96 % -- --   09/14/22 0106 -- -- 92 16 97 % -- --   09/14/22 0104 -- -- 92 14 97 % -- --   09/14/22 0101 -- -- 97 16 97 % -- --   09/14/22 0100 113/63 -- 93 15 97 % -- --   09/14/22 0056 -- -- 93 15 97 % -- --   09/14/22 0049 -- -- 94 15 97 % -- --   09/14/22 0046 -- -- 95 15 97 % -- --   09/14/22 0045 115/62 -- 96 15 97 % -- --   09/14/22 0036 -- -- 95 15 97 % -- --   09/14/22 0034 -- -- 94 14 97 % -- --   09/14/22 0031 -- -- 98 16 97 % -- --   09/14/22 0030 (!) 109/54 -- 97 15 97 % -- --   09/14/22 0026 -- -- 98 14 97 % -- --   09/14/22 0019 -- -- 98 15 97 % -- --   09/14/22 0016 -- -- 99 13 97 % -- --   09/14/22 0015 (!) 110/57 -- 100 16 97 % -- --   09/14/22 0006 -- -- 99 16 97 % -- --   09/14/22 0004 -- -- 97 16 97 % -- --   09/14/22 0001 -- -- 99 15 97 % -- --   09/14/22 0000 (!) 108/57 -- 99 16 97 % -- --   09/13/22 2356 -- -- 100 16 97 % -- --   09/13/22 2349 -- -- 99 15 97 % -- --   09/13/22 2346 -- -- 100 13 96 % -- --   09/13/22 2345 (!) 110/56 -- 100 13 96 % -- --   09/13/22 2336 -- -- 95 15 96 % -- --   09/13/22 2334 -- -- 100 15 96 % -- --   09/13/22 2331 -- -- (!) 103 16 96 % -- --   09/13/22 2330 -- -- (!) 103 14 96 % -- --   09/13/22 2327 111/61 -- 100 15 97 % -- --   09/13/22 2326 -- -- 100 15 97 % -- --   09/13/22 2319 -- -- 97 15 97 % -- --   09/13/22 2316 -- -- (!) 104 15 97 % -- --   09/13/22 2315 -- -- (!) 103 15 96 % -- --   09/13/22 2312 120/63 -- (!) 102 15 96 % -- --   09/13/22 2306 -- -- (!) 104 16 97 % -- --   09/13/22 2304 -- -- (!) 104 15 96 % -- --   09/13/22 2301 -- -- 99 16 97 % -- --   09/13/22 2300 -- -- (!) 107 16 96 % -- --   09/13/22 2257 118/61 -- (!) 104 17 97 % -- --   09/13/22 2256 -- -- (!) 104 15 97 % -- --   09/13/22 2249 -- -- (!) 106 18 97 % -- --   09/13/22 2246 -- -- (!) 105 16 97 % -- --   09/13/22 2245 -- -- (!) 104 16 96 % -- --   09/13/22 2242 120/60 -- (!) 113 19 96 % -- --   09/13/22 2236 -- -- (!) 103 16 97 % -- --   09/13/22 2234 -- -- (!) 104 17 97 % -- --   09/13/22 2231 -- -- (!) 105 18 97 % -- --   09/13/22 2230 -- -- 100 17 97 % -- --   09/13/22 2227 119/64 -- (!) 103 18 97 % -- --   09/13/22 2226 -- -- 100 16 99 % -- --   09/13/22 2219 -- -- (!) 104 17 97 % -- --   09/13/22 2216 -- -- (!) 103 13 97 % -- --   09/13/22 2215 -- -- (!) 105 12 97 % -- --   09/13/22 2212 131/61 -- (!) 103 13 96 % -- --   09/13/22 2206 -- -- (!) 106 13 97 % -- --   09/13/22 2204 -- -- (!) 106 14 96 % -- --   09/13/22 2201 -- -- (!) 109 13 96 % -- --   09/13/22 2200 -- -- (!) 106 10 97 % -- --   09/13/22 2157 125/62 -- (!) 106 15 96 % -- --   09/13/22 2156 -- -- (!) 104 11 96 % -- --   09/13/22 2149 -- -- (!) 106 12 96 % -- --   09/13/22 2146 -- -- (!) 104 11 96 % -- --   09/13/22 2145 -- -- (!) 102 10 96 % -- --   09/13/22 2142 127/68 -- (!) 103 12 96 % -- --   09/13/22 2136 -- -- (!) 102 11 96 % -- --   09/13/22 2134 -- -- (!) 105 12 97 % -- --   09/13/22 2131 -- -- (!) 102 10 97 % -- --   09/13/22 2130 132/66 -- (!) 103 12 97 % -- --   09/13/22 2126 -- -- (!) 102 12 97 % -- --   09/13/22 2119 -- -- (!) 105 13 96 % -- --   09/13/22 2116 -- -- (!) 107 15 96 % -- --   09/13/22 2115 138/76 -- (!) 102 13 97 % -- --   09/13/22 2106 -- -- 98 12 97 % -- --   09/13/22 2104 -- -- 100 12 97 % -- --   09/13/22 2101 -- -- (!) 101 14 97 % -- --   09/13/22 2100 133/64 -- (!) 102 14 97 % -- --   09/13/22 2056 -- -- (!) 108 13 97 % -- --   09/13/22 2049 -- -- (!) 103 11 98 % -- --   09/13/22 2046 -- -- 100 12 97 % -- --   09/13/22 2045 (!) 123/59 -- (!) 103 13 97 % -- --   09/13/22 2036 -- -- (!) 101 11 99 % -- --   09/13/22 2034 -- -- (!) 102 13 97 % -- --   09/13/22 2031 -- -- (!) 101 14 97 % -- --   09/13/22 2030 124/62 -- 100 12 97 % -- --   09/13/22 2026 -- -- (!) 104 13 97 % -- --   09/13/22 2019 -- -- (!) 106 19 97 % -- --   09/13/22 2016 -- -- (!) 102 11 97 % -- --   09/13/22 2015 130/67 -- (!) 106 8 97 % -- --   09/13/22 2006 -- -- (!) 106 13 97 % -- --   09/13/22 2004 -- -- (!) 103 14 97 % -- --   09/13/22 2001 -- -- (!) 104 13 97 % -- --   09/13/22 2000 (!) 123/59 98.5 °F (36.9 °C) (!) 104 14 97 % -- --   09/13/22 1956 -- -- (!) 108 13 97 % -- --   09/13/22 1949 -- -- (!) 107 14 97 % -- --   09/13/22 1946 -- -- (!) 105 14 97 % -- --   09/13/22 1945 129/64 -- (!) 105 13 97 % -- --   09/13/22 1936 -- -- (!) 104 12 97 % -- --   09/13/22 1934 -- -- (!) 102 13 97 % -- --   09/13/22 1931 -- -- (!) 107 14 97 % -- --   09/13/22 1930 127/61 -- (!) 104 13 97 % -- --   09/13/22 1926 -- -- (!) 103 13 97 % -- --   09/13/22 1919 -- -- (!) 104 12 97 % -- --   09/13/22 1916 -- -- (!) 103 12 98 % -- --   09/13/22 1915 133/65 -- (!) 105 12 97 % -- --   09/13/22 1906 -- -- (!) 107 11 97 % -- --   09/13/22 1904 -- -- (!) 107 15 97 % -- --   09/13/22 1901 -- -- (!) 104 14 97 % -- --   09/13/22 1900 132/65 -- (!) 103 11 97 % -- --   09/13/22 1856 -- -- (!) 108 12 98 % -- --   09/13/22 1849 -- -- (!) 107 15 98 % -- --   09/13/22 1846 -- -- (!) 107 11 97 % -- --   09/13/22 1845 (!) 135/58 -- (!) 110 17 97 % -- --   09/13/22 1836 -- -- (!) 105 13 97 % -- --   09/13/22 1834 -- -- (!) 106 11 96 % -- --   09/13/22 1831 -- -- (!) 104 13 97 % -- --   09/13/22 1830 (!) 141/63 -- (!) 103 14 97 % -- --   09/13/22 1826 -- -- (!) 107 14 97 % -- --   09/13/22 1819 -- -- (!) 104 12 97 % -- --   09/13/22 1816 -- -- (!) 107 10 97 % -- --   09/13/22 1815 (!) 146/61 -- (!) 107 13 97 % -- --   09/13/22 1806 -- -- (!) 105 12 97 % -- --   09/13/22 1804 -- -- (!) 108 14 97 % -- --   09/13/22 1801 -- -- 89 11 97 % -- --   09/13/22 1800 (!) 146/81 -- 98 15 95 % -- --   09/13/22 1756 -- -- (!) 104 11 97 % -- --   09/13/22 1749 (!) 134/54 -- (!) 105 15 98 % -- --   09/13/22 1746 -- -- (!) 101 12 97 % -- --   09/13/22 1745 -- -- (!) 105 14 98 % -- --   09/13/22 1736 -- -- (!) 102 13 97 % -- --   09/13/22 1734 -- -- (!) 104 13 97 % -- --   09/13/22 1731 -- -- (!) 102 13 97 % -- --   09/13/22 1730 (!) 125/52 -- (!) 107 21 98 % -- --   09/13/22 1726 -- -- (!) 109 13 97 % -- --   09/13/22 1719 (!) 134/59 -- (!) 106 16 97 % -- --   09/13/22 1716 -- -- (!) 102 10 98 % -- --   09/13/22 1715 -- -- (!) 106 10 97 % -- --   09/13/22 1706 -- -- (!) 105 14 97 % -- --   09/13/22 1704 -- -- (!) 101 11 98 % -- --   09/13/22 1701 -- -- 92 14 -- -- --   09/13/22 1700 (!) 149/59 -- 65 12 96 % -- --   09/13/22 1659 (!) 110/57 -- -- -- -- 5' 4\" (1.626 m) 99.8 kg (220 lb)   09/13/22 1656 -- -- (!) 104 13 98 % -- --   09/13/22 1649 (!) 138/59 -- (!) 106 16 94 % -- --   09/13/22 1646 -- -- (!) 105 25 95 % -- --   09/13/22 1645 -- -- (!) 102 12 97 % -- --   09/13/22 1636 -- -- (!) 105 14 96 % -- --   09/13/22 1634 -- -- (!) 105 12 95 % -- --   09/13/22 1631 -- -- (!) 106 15 97 % -- --   09/13/22 1630 (!) 135/55 -- (!) 109 15 96 % -- --   09/13/22 1626 -- -- 96 14 95 % -- --   09/13/22 1619 (!) 119/58 -- 96 15 95 % -- --   09/13/22 1616 -- -- 98 18 95 % -- --   09/13/22 1615 -- -- 98 16 95 % -- --   09/13/22 1606 -- -- 97 15 95 % -- --   09/13/22 1604 -- -- 97 16 95 % -- --   09/13/22 1601 -- -- 97 16 95 % -- --   09/13/22 1600 (!) 110/57 -- 100 16 95 % -- --   09/13/22 1556 -- -- 98 16 95 % -- --   09/13/22 1549 -- -- 95 16 95 % -- --   09/13/22 1546 -- -- 97 17 95 % -- --   09/13/22 1545 (!) 123/56 -- 98 17 95 % -- --   09/13/22 1536 -- -- 96 15 96 % -- --   09/13/22 1534 -- -- 97 16 95 % -- --   09/13/22 1531 -- -- 96 17 96 % -- --   09/13/22 1530 (!) 121/53 -- 97 16 96 % -- --   09/13/22 1526 -- -- 95 15 95 % -- --   09/13/22 1519 -- -- 95 16 95 % -- --   09/13/22 1516 -- -- (!) 104 15 96 % -- --   09/13/22 1515 (!) 120/54 -- (!) 107 17 95 % -- --   09/13/22 1506 -- -- 96 15 95 % -- --   09/13/22 1504 -- -- 99 17 95 % -- --   09/13/22 1501 -- -- 97 15 94 % -- --   09/13/22 1500 (!) 111/59 -- 97 16 94 % -- --   09/13/22 1456 -- -- 100 15 93 % -- --   09/13/22 1449 -- -- 99 16 94 % -- --   09/13/22 1446 -- -- 96 17 94 % -- --   09/13/22 1445 (!) 114/57 -- 99 17 94 % -- --   09/13/22 1436 -- -- (!) 105 17 94 % -- --   09/13/22 1434 -- -- 97 18 94 % -- --   09/13/22 1431 -- -- 96 16 94 % -- --   09/13/22 1430 (!) 122/59 -- 98 18 95 % -- --   09/13/22 1426 -- -- 98 17 94 % -- --   09/13/22 1419 117/62 -- 95 18 94 % -- --   09/13/22 1416 -- -- 97 19 95 % -- --   09/13/22 1415 -- -- 99 18 95 % -- --   09/13/22 1406 -- -- (!) 104 14 94 % -- --   09/13/22 1404 -- -- 100 15 94 % -- --   09/13/22 1401 -- -- 100 15 94 % -- --   09/13/22 1400 (!) 88/70 98 °F (36.7 °C) (!) 101 16 95 % -- --   09/13/22 1356 -- -- (!) 108 18 94 % -- --   09/13/22 1349 -- -- (!) 102 13 95 % -- --   09/13/22 1346 -- -- (!) 103 13 97 % -- --   09/13/22 1345 -- -- (!) 102 14 96 % -- --   09/13/22 1336 -- -- (!) 106 12 94 % -- --   09/13/22 1334 (!) 111/51 -- (!) 103 15 95 % -- --   09/13/22 1331 -- -- (!) 102 14 94 % -- --   09/13/22 1330 (!) 117/56 -- (!) 104 13 94 % -- --   09/13/22 1326 (!) 117/56 97.8 °F (36.6 °C) (!) 106 15 94 % -- --   09/13/22 1319 (!) 122/55 -- (!) 102 16 94 % -- --   09/13/22 1315 -- -- (!) 107 12 95 % -- --   09/13/22 1306 -- -- (!) 104 14 95 % -- --   09/13/22 1304 -- -- (!) 107 12 94 % -- --   09/13/22 1301 -- -- (!) 105 17 94 % -- --   09/13/22 1300 -- -- (!) 103 13 94 % -- --   09/13/22 1256 -- -- (!) 105 15 95 % -- -- 09/13/22 1249 (!) 111/54 -- (!) 108 13 95 % -- --   09/13/22 1246 -- -- (!) 104 14 98 % -- --   09/13/22 1245 -- -- 96 14 95 % -- --   09/13/22 1237 -- 98.1 °F (36.7 °C) -- -- -- -- --   09/13/22 1236 -- -- (!) 103 10 97 % -- --   09/13/22 1234 -- -- 95 12 96 % -- --   09/13/22 1231 -- -- 97 12 95 % -- --   09/13/22 1230 (!) 108/49 -- (!) 104 10 93 % -- --   09/13/22 1226 -- -- (!) 101 15 94 % -- --   09/13/22 1219 (!) 113/54 -- (!) 104 15 95 % -- --   09/13/22 1216 -- -- (!) 101 10 95 % -- --   09/13/22 1215 -- -- 97 13 94 % -- --   09/13/22 1206 -- -- 99 15 94 % -- --   09/13/22 1204 -- -- 99 18 94 % -- --   09/13/22 1201 -- -- 100 15 94 % -- --   09/13/22 1200 (!) 97/49 -- 99 15 93 % -- --   09/13/22 1156 -- -- 100 16 94 % -- --   09/13/22 1149 -- -- 100 16 95 % -- --   09/13/22 1146 -- -- 100 15 95 % -- --   09/13/22 1145 -- -- 95 16 96 % -- --   09/13/22 1136 -- -- 98 15 95 % -- --   09/13/22 1134 -- -- (!) 105 15 95 % -- --   09/13/22 1131 -- -- (!) 102 13 96 % -- --   09/13/22 1130 (!) 108/43 -- (!) 106 11 95 % -- --   09/13/22 1126 -- -- (!) 104 15 96 % -- --   09/13/22 1119 (!) 111/48 -- (!) 104 15 95 % -- --   09/13/22 1116 -- -- (!) 103 17 95 % -- --   09/13/22 1115 -- -- (!) 103 12 95 % -- --   09/13/22 1106 -- -- (!) 107 10 94 % -- --   09/13/22 1104 (!) 109/55 -- (!) 104 16 95 % -- --   09/13/22 1101 -- -- (!) 108 16 96 % -- --   09/13/22 1100 (!) 89/43 -- (!) 106 12 96 % -- --   09/13/22 1056 -- -- (!) 108 16 95 % -- --   09/13/22 1049 (!) 109/57 -- (!) 109 14 96 % -- --   09/13/22 1046 -- -- (!) 108 12 96 % -- --   09/13/22 1045 -- -- (!) 107 12 96 % -- --   09/13/22 1036 -- -- (!) 104 12 96 % -- --   09/13/22 1034 -- -- (!) 107 13 96 % -- --   09/13/22 1031 -- -- (!) 108 11 97 % -- --   09/13/22 1030 (!) 99/49 -- (!) 108 16 97 % -- --   09/13/22 1026 -- -- (!) 105 14 96 % -- --   09/13/22 1019 (!) 110/52 -- (!) 108 11 96 % -- --   09/13/22 1016 -- -- (!) 112 (!) 7 95 % -- --   09/13/22 1015 -- -- (!) 108 11 96 % -- --   09/13/22 1006 -- -- (!) 107 12 96 % -- --   09/13/22 1004 -- -- (!) 106 9 96 % -- --   09/13/22 1001 -- -- (!) 107 11 96 % -- --   09/13/22 1000 (!) 99/44 -- (!) 108 13 97 % -- --   09/13/22 0956 -- -- (!) 109 18 96 % -- --   09/13/22 0949 (!) 114/49 -- (!) 102 12 96 % -- --   09/13/22 0946 -- -- (!) 103 12 96 % -- --   09/13/22 0945 -- -- (!) 105 12 96 % -- --   09/13/22 0936 -- -- (!) 104 11 96 % -- --   09/13/22 0934 -- -- (!) 103 12 96 % -- --   09/13/22 0931 -- -- (!) 103 14 96 % -- --   09/13/22 0930 (!) 104/53 -- (!) 102 17 98 % -- --   09/13/22 0926 -- -- (!) 104 13 96 % -- --   09/13/22 0919 (!) 107/47 -- 98 14 96 % -- --   09/13/22 0916 -- -- (!) 101 11 97 % -- --   09/13/22 0915 (!) 102/44 -- (!) 105 13 97 % -- --   09/13/22 0906 -- -- (!) 105 14 97 % -- --   09/13/22 0904 -- -- (!) 106 13 98 % -- --   09/13/22 0901 -- -- (!) 109 14 99 % -- --   09/13/22 0900 (!) 113/55 -- (!) 106 12 97 % -- --   09/13/22 0856 -- -- (!) 105 12 97 % -- --   09/13/22 0849 (!) 96/42 -- (!) 103 11 97 % -- --   09/13/22 0846 -- -- (!) 106 15 97 % -- --   09/13/22 0845 -- -- (!) 102 15 97 % -- --   09/13/22 0836 -- -- (!) 112 12 100 % -- --   09/13/22 0834 98/76 -- (!) 108 10 97 % -- --   09/13/22 0831 -- -- (!) 109 10 97 % -- --   09/13/22 0830 -- -- (!) 108 13 96 % -- --   09/13/22 0826 -- -- (!) 105 18 99 % -- --   09/13/22 0819 (!) 108/53 -- (!) 104 13 97 % -- --   09/13/22 0816 -- -- (!) 111 18 95 % -- --   09/13/22 0815 -- -- (!) 104 11 98 % -- --   09/13/22 0810 (!) 110/52 -- (!) 106 -- -- -- --   09/13/22 0806 -- -- (!) 105 12 97 % -- --   09/13/22 0804 -- -- (!) 105 12 97 % -- --   09/13/22 0801 -- -- (!) 107 13 97 % -- --   09/13/22 0800 (!) 110/52 -- (!) 106 12 97 % -- --   09/13/22 0756 -- -- (!) 101 11 98 % -- --   09/13/22 0749 -- -- (!) 110 21 97 % -- --   09/13/22 0746 -- -- (!) 106 13 96 % -- --   09/13/22 0745 (!) 109/54 -- (!) 103 11 97 % -- --   09/13/22 0736 -- -- (!) 106 13 97 % -- --   09/13/22 0734 -- -- (!) 109 14 98 % -- --   09/13/22 0731 -- -- (!) 108 15 97 % -- --   09/13/22 0730 (!) 108/37 -- (!) 106 11 97 % -- --   09/13/22 0726 -- -- (!) 106 12 98 % -- --       Objective:      Patient Vitals for the past 24 hrs:   BP Temp Pulse Resp SpO2   09/15/22 0400 -- 98.1 °F (36.7 °C) -- -- --   09/15/22 0130 (!) 85/48 -- 91 13 91 %   09/15/22 0125 (!) 85/49 -- 94 12 92 %   09/15/22 0115 (!) 97/53 -- 91 13 92 %   09/15/22 0100 (!) 97/55 -- 91 13 91 %   09/15/22 0045 (!) 105/54 -- 88 14 93 %   09/15/22 0030 100/62 -- 97 18 92 %   09/15/22 0021 -- -- 92 14 94 %   09/15/22 0015 (!) 101/55 -- 92 13 (!) 89 %   09/15/22 0000 104/60 97.8 °F (36.6 °C) 91 13 91 %   09/14/22 2345 103/60 -- 92 14 94 %   09/14/22 2315 (!) 122/57 -- 93 14 91 %   09/14/22 2300 118/66 97.8 °F (36.6 °C) 97 15 93 %   09/14/22 2245 129/72 -- 91 13 94 %   09/14/22 2230 125/69 -- 92 16 93 %   09/14/22 2215 138/65 -- 91 9 94 %   09/14/22 2200 133/79 -- (!) 105 22 93 %   09/14/22 2145 125/67 -- (!) 107 10 94 %   09/14/22 2130 123/67 -- 100 12 93 %   09/14/22 2115 125/69 -- (!) 104 19 94 %   09/14/22 2100 (!) 142/72 -- (!) 102 14 93 %   09/14/22 2045 130/63 -- 98 11 93 %   09/14/22 2030 135/72 -- (!) 101 14 93 %   09/14/22 2015 128/66 -- (!) 104 15 93 %   09/14/22 2000 136/77 98.4 °F (36.9 °C) 95 11 92 %   09/14/22 1945 130/68 -- 97 11 93 %   09/14/22 1930 130/79 -- 99 13 93 %   09/14/22 1915 124/66 -- 97 11 93 %   09/14/22 1900 136/78 -- 98 12 94 %   09/14/22 1845 -- -- 98 14 94 %   09/14/22 1830 131/78 -- (!) 104 14 95 %   09/14/22 1815 126/64 -- (!) 110 15 93 %   09/14/22 1800 122/84 -- (!) 113 14 --   09/14/22 1745 112/67 -- (!) 114 11 93 %   09/14/22 1730 107/66 -- (!) 101 13 --   09/14/22 1715 108/66 -- (!) 104 14 95 %   09/14/22 1701 -- -- (!) 104 18 92 %   09/14/22 1700 106/63 -- (!) 102 20 --   09/14/22 1646 122/71 -- (!) 106 14 --   09/14/22 1645 -- -- (!) 106 16 --   09/14/22 1631 121/66 -- (!) 101 20 --   09/14/22 1630 -- -- (!) 101 20 --   09/14/22 1626 -- -- (!) 103 19 90 %   09/14/22 1616 116/70 -- (!) 101 17 --   09/14/22 1615 -- -- (!) 103 23 --   09/14/22 1606 118/64 -- 96 13 --   09/14/22 1601 -- -- 96 18 93 %   09/14/22 1600 -- -- (!) 105 19 93 %   09/14/22 1556 -- -- 98 19 92 %   09/14/22 1546 123/66 -- 99 17 --   09/14/22 1545 -- -- (!) 101 16 93 %   09/14/22 1536 -- -- (!) 101 16 93 %   09/14/22 1531 -- 98.5 °F (36.9 °C) (!) 101 15 93 %   09/14/22 1530 (!) 103/56 -- (!) 102 19 93 %   09/14/22 1526 -- -- (!) 102 19 93 %   09/14/22 1516 -- -- (!) 111 19 --   09/14/22 1515 126/72 -- (!) 104 17 94 %   09/14/22 1506 -- -- (!) 108 17 --   09/14/22 1501 -- -- (!) 109 13 --   09/14/22 1500 134/72 -- (!) 108 14 93 %   09/14/22 1456 -- -- (!) 109 17 93 %   09/14/22 1446 -- -- (!) 108 17 94 %   09/14/22 1445 130/76 -- (!) 108 16 94 %   09/14/22 1436 -- -- (!) 109 16 94 %   09/14/22 1431 -- -- (!) 108 15 95 %   09/14/22 1430 117/67 -- (!) 112 16 95 %   09/14/22 1426 -- -- (!) 109 16 94 %   09/14/22 1416 -- -- (!) 109 18 92 %   09/14/22 1415 131/71 -- (!) 109 19 93 %   09/14/22 1406 -- -- (!) 110 16 94 %   09/14/22 1401 -- -- (!) 110 17 94 %   09/14/22 1400 129/79 -- (!) 110 16 97 %   09/14/22 1356 139/79 -- (!) 111 14 94 %   09/14/22 1346 -- -- (!) 107 14 96 %   09/14/22 1345 -- -- (!) 107 16 94 %   09/14/22 1336 -- -- (!) 111 16 96 %   09/14/22 1334 134/75 -- (!) 112 14 94 %   09/14/22 1331 -- -- (!) 109 17 95 %   09/14/22 1330 -- -- (!) 111 15 97 %   09/14/22 1326 -- -- (!) 109 11 94 %   09/14/22 1316 -- -- (!) 112 13 94 %   09/14/22 1315 126/71 -- (!) 112 12 94 %   09/14/22 1306 -- -- (!) 109 15 95 %   09/14/22 1304 131/77 -- (!) 107 13 97 %   09/14/22 1301 -- -- (!) 109 11 97 %   09/14/22 1300 -- -- (!) 109 18 97 %   09/14/22 1256 -- -- (!) 110 17 98 %   09/14/22 1246 -- -- (!) 109 (!) 32 97 %   09/14/22 1245 135/80 -- (!) 111 12 94 %   09/14/22 1236 (!) 126/91 -- (!) 112 16 97 %   09/14/22 1234 101/80 98.3 °F (36.8 °C) (!) 116 20 98 %   09/14/22 1231 -- -- (!) 115 11 97 %   09/14/22 1230 -- -- (!) 116 10 97 %   09/14/22 1226 -- -- (!) 115 16 97 %   09/14/22 1216 -- -- (!) 113 9 99 %   09/14/22 1215 135/82 -- (!) 112 12 98 %   09/14/22 1206 -- -- (!) 112 12 97 %   09/14/22 1204 126/83 -- (!) 111 12 97 %   09/14/22 1201 -- -- (!) 112 11 96 %   09/14/22 1200 -- -- (!) 115 16 97 %   09/14/22 1156 -- -- (!) 110 11 96 %   09/14/22 1146 -- -- (!) 117 16 97 %   09/14/22 1145 106/63 -- (!) 109 9 96 %   09/14/22 1136 -- -- (!) 111 9 95 %   09/14/22 1134 133/74 -- (!) 110 9 95 %   09/14/22 1131 -- -- (!) 119 22 95 %   09/14/22 1130 -- -- (!) 113 12 95 %   09/14/22 1126 -- -- (!) 106 9 95 %   09/14/22 1115 137/81 -- (!) 114 11 95 %   09/14/22 1106 -- -- (!) 105 (!) 7 94 %   09/14/22 1104 92/69 -- (!) 105 10 95 %   09/14/22 1101 -- -- (!) 101 9 95 %   09/14/22 1100 -- -- (!) 102 8 94 %   09/14/22 1056 -- -- (!) 103 9 94 %   09/14/22 1046 -- -- (!) 106 12 94 %   09/14/22 1045 (!) 145/75 -- (!) 104 10 95 %   09/14/22 1036 -- -- (!) 105 13 95 %   09/14/22 1034 138/66 -- 97 16 95 %   09/14/22 1031 -- -- (!) 107 15 96 %   09/14/22 1030 -- -- (!) 101 14 95 %   09/14/22 1026 -- -- 99 14 95 %   09/14/22 1019 131/68 -- (!) 102 17 95 %   09/14/22 1016 -- -- (!) 103 16 96 %   09/14/22 1015 -- -- (!) 106 17 96 %   09/14/22 1006 -- -- (!) 112 20 94 %   09/14/22 1004 125/68 -- (!) 109 18 96 %   09/14/22 1001 -- -- (!) 112 16 96 %   09/14/22 1000 -- -- (!) 110 16 94 %   09/14/22 0956 -- -- (!) 113 14 96 %   09/14/22 0949 134/73 -- (!) 108 12 (!) 88 %   09/14/22 0946 -- -- (!) 105 15 --   09/14/22 0945 -- -- (!) 103 17 --   09/14/22 0936 -- -- (!) 105 14 --   09/14/22 0934 131/71 -- (!) 102 15 --   09/14/22 0931 -- -- (!) 101 11 --   09/14/22 0930 -- -- (!) 107 9 --   09/14/22 0926 -- -- (!) 106 15 --   09/14/22 0919 130/78 -- (!) 103 13 96 %   09/14/22 0916 -- -- (!) 107 16 96 %   09/14/22 0915 -- -- (!) 104 17 96 %   09/14/22 0906 -- -- 100 12 96 %   09/14/22 0904 110/61 -- 100 11 96 %   09/14/22 0901 -- -- (!) 102 11 96 %   09/14/22 0900 -- -- (!) 101 11 97 %   09/14/22 0856 -- -- 99 12 96 %   09/14/22 0849 136/77 -- (!) 101 10 96 %   09/14/22 0846 -- -- (!) 103 13 96 %   09/14/22 0845 -- -- 99 11 96 %   09/14/22 0836 -- -- 96 9 96 %   09/14/22 0834 128/73 -- 98 9 96 %   09/14/22 0831 -- -- 100 9 96 %   09/14/22 0830 -- -- (!) 101 13 96 %   09/14/22 0826 -- -- 87 11 96 %   09/14/22 0819 128/67 -- (!) 103 15 98 %   09/14/22 0816 -- -- (!) 106 13 97 %   09/14/22 0815 -- -- (!) 101 13 96 %   09/14/22 0806 -- -- 95 11 96 %   09/14/22 0804 126/72 -- 96 10 95 %   09/14/22 0801 -- -- 91 9 96 %   09/14/22 0800 -- -- 97 11 96 %   09/14/22 0756 -- -- 94 11 96 %   09/14/22 0752 -- -- -- -- 97 %   09/14/22 0749 129/69 -- 93 11 96 %   09/14/22 0746 -- -- 94 11 96 %   09/14/22 0745 -- -- 97 11 96 %   09/14/22 0736 -- -- 97 13 99 %   09/14/22 0734 127/70 -- 98 13 97 %   09/14/22 0731 -- -- 96 12 96 %   09/14/22 0730 -- -- 92 12 97 %   09/14/22 0726 -- -- 99 13 96 %                 Abdomen:  Soft. notdistended, bowel sounds present    moderately tender   Incision:  no significant drainage, no dehiscence, no significant erythema   DVT Evaluation:  No evidence of DVT seen on physical exam.  Negative Sherrill's sign. No cords or calf tenderness. No significant calf/ankle edema. DRAIN:    Only 20cc in last 24 hrs,. Leave in  per Dr Sheila Valencia Review: All lab results for the last 24 hours reviewed. Assessment:     Status postop:  Postoperative course complicated by recovery of high blood loss      Surgery discussed with patient and further management in hospital. Long term prognosis discussed          Risk of deterioration: medium      BP dropping at times  and hb falling still, will recheck hb now  Bowel function is good        Plan: Mobilize, Postop care discussed including diet, ambulation, and actvitiy restrictions.   Recheck hb, increase hyoscamine frequency when hb stable  Discharge instructions and questions answered.     Signed By: Desirae Arnold MD     September 15, 2022
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
## 2022-09-15 NOTE — PROGRESS NOTES
Pulm/CC    Patient has been moved out of the ICU; lower abdominal pain/bladder spasms - further management deferred to surgical team.  I would sign off-please call if needed from ICU standpoint. Extensively discussed with pharmacy Jude Talley; medical unit pharmacist to follow-up patient tomorrow.     Eleanor Jordan MD

## 2022-09-15 NOTE — PROGRESS NOTES
Pharmacy Note:     Pt has three medications prescribed from: [facility] in [place].   [phone]   Contacted clinic and confirmed the following doses:    Suboxone 8 mg - 2 mg tablets  takes 1.5 tablets SL once daily   (most recent: 8/17/22)  Clonazepam 0.5 mg po three times daily  (7/20/22 x 2 refills)   Nortriptyline 50 mg po twice daily (7/20/22 x 2 refills)     (maintain lower dose nortriptyline (current order: nortriptyline 10 mg po bedtime)  while on hyoscyamine as both anticholinergic agents used together may increase constipation.)          Updated on PTA med list.

## 2022-09-15 NOTE — PROGRESS NOTES
Urology Progress Note    Subjective:     Daily Progress Note: 2022 8:06 PM    Amelia Robison is okay. Her biggest complaint is bladder spasms. She has urethral pain. She has low abd pain and the sense of having to void and she strains. Wilkerson has been draining fine all the time. No clots ir issues through the night, but the sensation is there. Objective:     Visit Vitals  /78   Pulse 98   Temp 98.5 °F (36.9 °C)   Resp 14   Ht 5' 4\" (1.626 m)   Wt 99.8 kg (220 lb)   LMP 2022   SpO2 94%   BMI 37.76 kg/m²        Temp (24hrs), Av.3 °F (36.8 °C), Min:98.2 °F (36.8 °C), Max:98.5 °F (36.9 °C)      Intake and Output:   07 -  1900  In: 7693 [P.O.:240; I.V.:2114.7]  Out: 12856 [KYKOX:94415; Drains:65]  No intake/output data recorded. Physical Exam:   Gen - NAD, Conversational  Abd - soft    UOP - very lightly tinged pink    Lab/Data Review:  BMP:   Lab Results   Component Value Date/Time     2022 04:00 AM    K 3.8 2022 04:00 AM     2022 04:00 AM    CO2 27 2022 04:00 AM    AGAP 4 2022 04:00 AM     (H) 2022 04:00 AM    BUN 9 2022 04:00 AM    CREA 0.58 (L) 2022 04:00 AM    GFRAA >60 2022 04:00 AM    GFRNA >60 2022 04:00 AM     CBC:   Lab Results   Component Value Date/Time    WBC 6.2 2022 04:00 AM    HGB 9.4 (L) 2022 01:45 PM    HCT 28.8 (L) 2022 01:45 PM    PLT 91 (L) 2022 04:00 AM       Assessment/Plan:     Principal Problem:    S/P abdominal hysterectomy (2022)    Active Problems:    Fibroid uterus (2022)      Hypovolemic shock (HCC) (2022)      Sinus tachycardia (2022)      Bladder injury (2022)      Hematuria (2022)      Bladder spasm (2022)    She is doing fine from the standpoint of progressing post-op. Her mild hematuria is NOT the cause of an H and H that is drifting.   Her anemia is secondary to losing a large volume of blood intra-operatively and having a prolonged case with insensible volume loss --  and that resuscitation has corrected. Furthermore, her anemia is also dilutional as she mobilizes her third-spaced fluids now. Her bladder spasms are problematic. Ideally, these would be very well controlled with B and O suppositories. However, a national shortage was hinted to 8 months ago and has come to fruition as of last week. Anticholinergics often take weeks to work and will add to any post-op ileus. Her transverse colon was noted to be full of hard stool intra-operatively and anticholinergics may only intensify her constipation.       Plan:    1) Send HOMER drain output for creatinine on Thursday   2) Continue burnette for the next 3 weeks at least and then cystogram as an outpatient  3) With B and o suppositories out, the next option is narcotics (which don't take away spasms per se but at least may make her not care about them) and we could ideally start gemtesa if the pharmacy can get it - it has a faster onset of action (1-3weeks vs 6-8) and no related bowel dysfunction

## 2022-09-16 LAB
ALBUMIN SERPL-MCNC: 3.1 G/DL (ref 3.4–5)
ALBUMIN/GLOB SERPL: 1 {RATIO} (ref 0.8–1.7)
ALP SERPL-CCNC: 124 U/L (ref 45–117)
ALT SERPL-CCNC: 39 U/L (ref 13–56)
ANION GAP SERPL CALC-SCNC: 1 MMOL/L (ref 3–18)
APTT PPP: 28.6 SEC (ref 23–36.4)
AST SERPL-CCNC: 51 U/L (ref 10–38)
BASOPHILS # BLD: 0 K/UL (ref 0–0.1)
BASOPHILS NFR BLD: 0 % (ref 0–2)
BILIRUB SERPL-MCNC: 0.5 MG/DL (ref 0.2–1)
BUN SERPL-MCNC: 13 MG/DL (ref 7–18)
BUN/CREAT SERPL: 21 (ref 12–20)
CALCIUM SERPL-MCNC: 8.8 MG/DL (ref 8.5–10.1)
CHLORIDE SERPL-SCNC: 105 MMOL/L (ref 100–111)
CO2 SERPL-SCNC: 31 MMOL/L (ref 21–32)
CREAT SERPL-MCNC: 0.61 MG/DL (ref 0.6–1.3)
DIFFERENTIAL METHOD BLD: ABNORMAL
EOSINOPHIL # BLD: 0.3 K/UL (ref 0–0.4)
EOSINOPHIL NFR BLD: 5 % (ref 0–5)
ERYTHROCYTE [DISTWIDTH] IN BLOOD BY AUTOMATED COUNT: 15.4 % (ref 11.6–14.5)
FIBRINOGEN PPP-MCNC: 687 MG/DL (ref 210–451)
GLOBULIN SER CALC-MCNC: 3.1 G/DL (ref 2–4)
GLUCOSE BLD STRIP.AUTO-MCNC: 84 MG/DL (ref 70–110)
GLUCOSE BLD STRIP.AUTO-MCNC: 84 MG/DL (ref 70–110)
GLUCOSE BLD STRIP.AUTO-MCNC: 93 MG/DL (ref 70–110)
GLUCOSE SERPL-MCNC: 105 MG/DL (ref 74–99)
HCT VFR BLD AUTO: 24.3 % (ref 35–45)
HCT VFR BLD AUTO: 28.5 % (ref 35–45)
HCT VFR BLD AUTO: 30.9 % (ref 35–45)
HGB BLD-MCNC: 7.8 G/DL (ref 12–16)
HGB BLD-MCNC: 9 G/DL (ref 12–16)
HGB BLD-MCNC: 9.9 G/DL (ref 12–16)
IMM GRANULOCYTES # BLD AUTO: 0.1 K/UL (ref 0–0.04)
IMM GRANULOCYTES NFR BLD AUTO: 1 % (ref 0–0.5)
INR PPP: 1 (ref 0.8–1.2)
LIPASE SERPL-CCNC: 123 U/L (ref 73–393)
LYMPHOCYTES # BLD: 1.3 K/UL (ref 0.9–3.6)
LYMPHOCYTES NFR BLD: 23 % (ref 21–52)
MCH RBC QN AUTO: 28.4 PG (ref 24–34)
MCHC RBC AUTO-ENTMCNC: 32.1 G/DL (ref 31–37)
MCV RBC AUTO: 88.4 FL (ref 78–100)
MONOCYTES # BLD: 0.4 K/UL (ref 0.05–1.2)
MONOCYTES NFR BLD: 6 % (ref 3–10)
NEUTS SEG # BLD: 3.6 K/UL (ref 1.8–8)
NEUTS SEG NFR BLD: 64 % (ref 40–73)
NRBC # BLD: 0 K/UL (ref 0–0.01)
NRBC BLD-RTO: 0 PER 100 WBC
PLATELET # BLD AUTO: 157 K/UL (ref 135–420)
PMV BLD AUTO: 9.5 FL (ref 9.2–11.8)
POTASSIUM SERPL-SCNC: 3.9 MMOL/L (ref 3.5–5.5)
PROT SERPL-MCNC: 6.2 G/DL (ref 6.4–8.2)
PROTHROMBIN TIME: 13.2 SEC (ref 11.5–15.2)
RBC # BLD AUTO: 2.75 M/UL (ref 4.2–5.3)
SODIUM SERPL-SCNC: 137 MMOL/L (ref 136–145)
WBC # BLD AUTO: 5.6 K/UL (ref 4.6–13.2)

## 2022-09-16 PROCEDURE — 85025 COMPLETE CBC W/AUTO DIFF WBC: CPT

## 2022-09-16 PROCEDURE — 74011250637 HC RX REV CODE- 250/637: Performed by: OBSTETRICS & GYNECOLOGY

## 2022-09-16 PROCEDURE — 74011636637 HC RX REV CODE- 636/637: Performed by: HOSPITALIST

## 2022-09-16 PROCEDURE — 77010033678 HC OXYGEN DAILY

## 2022-09-16 PROCEDURE — 83690 ASSAY OF LIPASE: CPT

## 2022-09-16 PROCEDURE — 74011000250 HC RX REV CODE- 250: Performed by: INTERNAL MEDICINE

## 2022-09-16 PROCEDURE — 85384 FIBRINOGEN ACTIVITY: CPT

## 2022-09-16 PROCEDURE — P9047 ALBUMIN (HUMAN), 25%, 50ML: HCPCS | Performed by: INTERNAL MEDICINE

## 2022-09-16 PROCEDURE — 85018 HEMOGLOBIN: CPT

## 2022-09-16 PROCEDURE — 82962 GLUCOSE BLOOD TEST: CPT

## 2022-09-16 PROCEDURE — 74011250636 HC RX REV CODE- 250/636: Performed by: INTERNAL MEDICINE

## 2022-09-16 PROCEDURE — 74011000250 HC RX REV CODE- 250: Performed by: OBSTETRICS & GYNECOLOGY

## 2022-09-16 PROCEDURE — 74011250637 HC RX REV CODE- 250/637: Performed by: INTERNAL MEDICINE

## 2022-09-16 PROCEDURE — 74011000258 HC RX REV CODE- 258: Performed by: INTERNAL MEDICINE

## 2022-09-16 PROCEDURE — 85610 PROTHROMBIN TIME: CPT

## 2022-09-16 PROCEDURE — 36415 COLL VENOUS BLD VENIPUNCTURE: CPT

## 2022-09-16 PROCEDURE — 74011250637 HC RX REV CODE- 250/637: Performed by: UROLOGY

## 2022-09-16 PROCEDURE — 80053 COMPREHEN METABOLIC PANEL: CPT

## 2022-09-16 PROCEDURE — 65270000046 HC RM TELEMETRY

## 2022-09-16 PROCEDURE — 85730 THROMBOPLASTIN TIME PARTIAL: CPT

## 2022-09-16 RX ORDER — HYOSCYAMINE SULFATE 0.12 MG/1
.125-.25 TABLET SUBLINGUAL
Status: DISCONTINUED | OUTPATIENT
Start: 2022-09-16 | End: 2022-09-19 | Stop reason: HOSPADM

## 2022-09-16 RX ADMIN — CEFTRIAXONE 1 G: 1 INJECTION, POWDER, FOR SOLUTION INTRAMUSCULAR; INTRAVENOUS at 11:07

## 2022-09-16 RX ADMIN — NORTRIPTYLINE HYDROCHLORIDE 10 MG: 10 CAPSULE ORAL at 21:01

## 2022-09-16 RX ADMIN — HYDROCODONE BITARTRATE AND ACETAMINOPHEN 1 TABLET: 10; 325 TABLET ORAL at 18:06

## 2022-09-16 RX ADMIN — VITAM B12 100 MCG: 100 TAB at 08:53

## 2022-09-16 RX ADMIN — HYOSCYAMINE SULFATE 0.12 MG: 0.12 TABLET ORAL; SUBLINGUAL at 22:06

## 2022-09-16 RX ADMIN — HYOSCYAMINE SULFATE 0.25 MG: 0.12 TABLET ORAL; SUBLINGUAL at 05:38

## 2022-09-16 RX ADMIN — HYOSCYAMINE SULFATE 0.25 MG: 0.12 TABLET ORAL; SUBLINGUAL at 09:23

## 2022-09-16 RX ADMIN — LIDOCAINE HYDROCHLORIDE: 20 JELLY TOPICAL at 19:49

## 2022-09-16 RX ADMIN — ALBUMIN (HUMAN) 12.5 G: 0.25 INJECTION, SOLUTION INTRAVENOUS at 08:51

## 2022-09-16 RX ADMIN — ONDANSETRON 4 MG: 2 INJECTION INTRAMUSCULAR; INTRAVENOUS at 11:07

## 2022-09-16 RX ADMIN — HYOSCYAMINE SULFATE 0.25 MG: 0.12 TABLET ORAL; SUBLINGUAL at 14:52

## 2022-09-16 RX ADMIN — SODIUM CHLORIDE, PRESERVATIVE FREE 10 ML: 5 INJECTION INTRAVENOUS at 21:01

## 2022-09-16 RX ADMIN — HYOSCYAMINE SULFATE 0.25 MG: 0.12 TABLET ORAL; SUBLINGUAL at 18:39

## 2022-09-16 RX ADMIN — Medication 10 MG: at 20:57

## 2022-09-16 RX ADMIN — HYOSCYAMINE SULFATE 0.12 MG: 0.12 TABLET ORAL; SUBLINGUAL at 22:02

## 2022-09-16 RX ADMIN — LEVOTHYROXINE SODIUM 50 MCG: 0.05 TABLET ORAL at 05:37

## 2022-09-16 RX ADMIN — PHENAZOPYRIDINE HYDROCHLORIDE 200 MG: 100 TABLET ORAL at 17:58

## 2022-09-16 RX ADMIN — HYDROCODONE BITARTRATE AND ACETAMINOPHEN 1 TABLET: 10; 325 TABLET ORAL at 05:37

## 2022-09-16 RX ADMIN — VIBEGRON 75 MG: 75 TABLET, FILM COATED ORAL at 11:00

## 2022-09-16 RX ADMIN — PHENAZOPYRIDINE HYDROCHLORIDE 200 MG: 100 TABLET ORAL at 08:52

## 2022-09-16 RX ADMIN — SODIUM CHLORIDE, PRESERVATIVE FREE 10 ML: 5 INJECTION INTRAVENOUS at 17:58

## 2022-09-16 RX ADMIN — BUPRENORPHINE HCL 12 MG: 8 TABLET SUBLINGUAL at 08:52

## 2022-09-16 RX ADMIN — CLONAZEPAM 0.5 MG: 0.5 TABLET ORAL at 13:29

## 2022-09-16 RX ADMIN — HYDROCODONE BITARTRATE AND ACETAMINOPHEN 1 TABLET: 10; 325 TABLET ORAL at 22:02

## 2022-09-16 RX ADMIN — ACETAMINOPHEN 650 MG: 325 TABLET ORAL at 20:56

## 2022-09-16 RX ADMIN — PHENAZOPYRIDINE HYDROCHLORIDE 200 MG: 100 TABLET ORAL at 12:11

## 2022-09-16 RX ADMIN — SODIUM CHLORIDE, PRESERVATIVE FREE 10 ML: 5 INJECTION INTRAVENOUS at 05:48

## 2022-09-16 RX ADMIN — CLONAZEPAM 0.5 MG: 0.5 TABLET ORAL at 20:57

## 2022-09-16 RX ADMIN — CLONAZEPAM 0.5 MG: 0.5 TABLET ORAL at 08:53

## 2022-09-16 RX ADMIN — ALBUMIN (HUMAN) 12.5 G: 0.25 INJECTION, SOLUTION INTRAVENOUS at 20:57

## 2022-09-16 NOTE — ROUTINE PROCESS
Patient continues to complain of intermittent bladder spasms, medicated with Hyoscyamine. Good relief noted.

## 2022-09-16 NOTE — PROGRESS NOTES
Urology Progress Note    Subjective:     Daily Progress Note: 2022 7:37 AM    Edel Castellano is doing better with fewer and less severe bladder spasms    Objective:     Visit Vitals  /68   Pulse 88   Temp 97.4 °F (36.3 °C)   Resp 16   Ht 5' 4\" (1.626 m)   Wt 99.8 kg (220 lb)   LMP 2022   SpO2 100%   BMI 37.76 kg/m²        Temp (24hrs), Av.8 °F (36.6 °C), Min:97.4 °F (36.3 °C), Max:98.1 °F (36.7 °C)      Intake and Output:   1901 -  0700  In: 648.3 [P.O.:480; I.V.:168.3]  Out: 5870 [Urine:4050; Drains:30]  No intake/output data recorded. Physical Exam:   Abd - soft, nd    UOP - orange tinted    Lab/Data Review:  BMP:   Lab Results   Component Value Date/Time     2022 02:45 AM    K 3.9 2022 02:45 AM     2022 02:45 AM    CO2 31 2022 02:45 AM    AGAP 1 (L) 2022 02:45 AM     (H) 2022 02:45 AM    BUN 13 2022 02:45 AM    CREA 0.61 2022 02:45 AM    GFRAA >60 2022 02:45 AM    GFRNA >60 2022 02:45 AM     CBC:   Lab Results   Component Value Date/Time    WBC 5.6 2022 02:45 AM    HGB 7.8 (L) 2022 02:45 AM    HCT 24.3 (L) 2022 02:45 AM     2022 02:45 AM       Assessment/Plan:     Principal Problem:    S/P abdominal hysterectomy (2022)    Active Problems:    Fibroid uterus (2022)      Hypovolemic shock ([address] (2022)      Sinus tachycardia (2022)      Bladder injury (2022)      Hematuria (2022)      Bladder spasm (2022)      She is doing better  Plan:      Olene Self starts today. She should go home with that and hyoscamine for bladder spasms. She will likely come off of the hyoscamine in a few weeks and just take the Olene Self.       Cystogram as outpatient in a few weeks and f/u [facility] after the cystogram.    The stent will stay in for about 8  weeks

## 2022-09-16 NOTE — PROGRESS NOTES
No acute changes in pt status. Education provided and questions answered. Pt updated on plan of care. Incision dressing C/D/I. HOMER drain with minimal output. Pain managed with PRNs. Wilkerson in place. No other questions or concerns voiced by patient.

## 2022-09-16 NOTE — ROUTINE PROCESS
Bedside shift change report given to Maria Victoria Betancourt RN (oncoming nurse) by Anderson Buchanan RN   (offgoing nurse). Report included the following information SBAR, Kardex, Intake/Output, and Recent Results.

## 2022-09-16 NOTE — PROGRESS NOTES
Progress Note    Patient: Lj Rodriguez MRN: [id number]  SSN: [id number]    YOB: 1975  Age: 40 y.o. Sex: female    ROOM: [facility]/  Subjective:     Postop Day: 3     The patient feels better. Bladder spasms stopped. The patient denies emotional concerns. The patient is ambulating well. The patient  tolerating a normal diet. Flatus has been passed. BM done denies.      Objective:        Vitals table based on RN flowsheet:  Patient Vitals for the past 4 hrs:   Temp   09/16/22 0536 97.4 °F (36.3 °C)    Patient Vitals for the past 4 hrs:   Pulse   09/16/22 0536 88   09/16/22 0400 85    Patient Vitals for the past 4 hrs:   Resp   09/16/22 0536 16    Patient Vitals for the past 4 hrs:   BP   09/16/22 0536 123/68            Patient Vitals for the past 48 hrs:   BP Temp Pulse Resp SpO2   09/16/22 0536 123/68 97.4 °F (36.3 °C) 88 16 100 %   09/16/22 0400 -- -- 85 -- --   09/16/22 0000 -- -- 80 -- --   09/15/22 2318 115/68 97.7 °F (36.5 °C) 88 16 99 %   09/15/22 2021 -- -- -- -- 100 %   09/15/22 2005 (!) 152/88 97.8 °F (36.6 °C) 95 16 100 %   09/15/22 1955 -- -- 80 -- --   09/15/22 1640 127/68 98.1 °F (36.7 °C) 83 16 90 %   09/15/22 1200 -- 98 °F (36.7 °C) -- -- --   09/15/22 1101 118/65 -- 82 14 95 %   09/15/22 1047 120/68 -- 97 18 95 %   09/15/22 1030 124/67 -- 88 11 96 %   09/15/22 1000 122/72 -- 93 13 95 %   09/15/22 0930 116/77 -- 100 12 95 %   09/15/22 0900 (!) 145/83 -- 87 11 97 %   09/15/22 0833 -- 97.7 °F (36.5 °C) -- -- --   09/15/22 0830 128/82 -- (!) 105 11 96 %   09/15/22 0800 117/71 -- 81 10 97 %   09/15/22 0730 122/75 -- 80 9 96 %   09/15/22 0700 125/74 -- 83 10 96 %   09/15/22 0645 (!) 108/57 -- 85 11 94 %   09/15/22 0630 115/68 -- 81 10 93 %   09/15/22 0615 136/74 -- 84 10 96 %   09/15/22 0600 119/70 -- 78 10 95 %   09/15/22 0545 120/71 -- 78 10 96 %   09/15/22 0515 135/80 -- 82 10 95 %   09/15/22 0500 130/79 -- 77 11 96 %   09/15/22 0445 135/84 -- 73 19 95 %   09/15/22 0430 117/67 -- 84 9 95 %   09/15/22 0415 125/70 -- 80 11 95 %   09/15/22 0400 116/71 98.1 °F (36.7 °C) 84 11 95 %   09/15/22 0345 121/60 -- 83 11 94 %   09/15/22 0330 (!) 142/78 -- 83 14 96 %   09/15/22 0315 124/64 -- 82 11 96 %   09/15/22 0300 121/70 -- 81 14 96 %   09/15/22 0245 124/69 -- 80 13 96 %   09/15/22 0230 120/68 -- 84 11 95 %   09/15/22 0215 123/71 -- 83 12 95 %   09/15/22 0200 108/63 -- 82 12 --   09/15/22 0145 108/63 -- 83 11 --   09/15/22 0130 (!) 102/59 -- 92 12 91 %   09/15/22 0125 (!) 85/49 -- 94 12 92 %   09/15/22 0115 (!) 97/53 -- 91 13 92 %   09/15/22 0100 (!) 97/55 -- 91 13 91 %   09/15/22 0045 (!) 105/54 -- 88 14 93 %   09/15/22 0030 100/62 -- 97 18 92 %   09/15/22 0021 -- -- 92 14 94 %   09/15/22 0015 (!) 101/55 -- 92 13 (!) 89 %   09/15/22 0000 104/60 97.8 °F (36.6 °C) 91 13 91 %   09/14/22 2345 103/60 -- 92 14 94 %   09/14/22 2315 (!) 122/57 -- 93 14 91 %   09/14/22 2300 118/66 97.8 °F (36.6 °C) 97 15 93 %   09/14/22 2245 129/72 -- 91 13 94 %   09/14/22 2230 125/69 -- 92 16 93 %   09/14/22 2215 138/65 -- 91 9 94 %   09/14/22 2200 133/79 -- (!) 105 22 93 %   09/14/22 2145 125/67 -- (!) 107 10 94 %   09/14/22 2130 123/67 -- 100 12 93 %   09/14/22 2115 125/69 -- (!) 104 19 94 %   09/14/22 2100 (!) 142/72 -- (!) 102 14 93 %   09/14/22 2045 130/63 -- 98 11 93 %   09/14/22 2030 135/72 -- (!) 101 14 93 %   09/14/22 2015 128/66 -- (!) 104 15 93 %   09/14/22 2000 136/77 98.4 °F (36.9 °C) 95 11 92 %   09/14/22 1945 130/68 -- 97 11 93 %   09/14/22 1930 130/79 -- 99 13 93 %   09/14/22 1915 124/66 -- 97 11 93 %   09/14/22 1900 136/78 -- 98 12 94 %   09/14/22 1845 -- -- 98 14 94 %   09/14/22 1830 131/78 -- (!) 104 14 95 %   09/14/22 1815 126/64 -- (!) 110 15 93 %   09/14/22 1800 122/84 -- (!) 113 14 --   09/14/22 1745 112/67 -- (!) 114 11 93 %   09/14/22 1730 107/66 -- (!) 101 13 --   09/14/22 1715 108/66 -- (!) 104 14 95 %   09/14/22 1701 -- -- (!) 104 18 92 %   09/14/22 1700 106/63 -- (!) 102 20 --   09/14/22 1646 122/71 -- (!) 106 14 --   09/14/22 1645 -- -- (!) 106 16 --   09/14/22 1631 121/66 -- (!) 101 20 --   09/14/22 1630 -- -- (!) 101 20 --   09/14/22 1626 -- -- (!) 103 19 90 %   09/14/22 1616 116/70 -- (!) 101 17 --   09/14/22 1615 -- -- (!) 103 23 --   09/14/22 1606 118/64 -- 96 13 --   09/14/22 1601 -- -- 96 18 93 %   09/14/22 1600 -- -- (!) 105 19 93 %   09/14/22 1556 -- -- 98 19 92 %   09/14/22 1546 123/66 -- 99 17 --   09/14/22 1545 -- -- (!) 101 16 93 %   09/14/22 1536 -- -- (!) 101 16 93 %   09/14/22 1531 -- 98.5 °F (36.9 °C) (!) 101 15 93 %   09/14/22 1530 (!) 103/56 -- (!) 102 19 93 %   09/14/22 1526 -- -- (!) 102 19 93 %   09/14/22 1516 -- -- (!) 111 19 --   09/14/22 1515 126/72 -- (!) 104 17 94 %   09/14/22 1506 -- -- (!) 108 17 --   09/14/22 1501 -- -- (!) 109 13 --   09/14/22 1500 134/72 -- (!) 108 14 93 %   09/14/22 1456 -- -- (!) 109 17 93 %   09/14/22 1446 -- -- (!) 108 17 94 %   09/14/22 1445 130/76 -- (!) 108 16 94 %   09/14/22 1436 -- -- (!) 109 16 94 %   09/14/22 1431 -- -- (!) 108 15 95 %   09/14/22 1430 117/67 -- (!) 112 16 95 %   09/14/22 1426 -- -- (!) 109 16 94 %   09/14/22 1416 -- -- (!) 109 18 92 %   09/14/22 1415 131/71 -- (!) 109 19 93 %   09/14/22 1406 -- -- (!) 110 16 94 %   09/14/22 1401 -- -- (!) 110 17 94 %   09/14/22 1400 129/79 -- (!) 110 16 97 %   09/14/22 1356 139/79 -- (!) 111 14 94 %   09/14/22 1346 -- -- (!) 107 14 96 %   09/14/22 1345 -- -- (!) 107 16 94 %   09/14/22 1336 -- -- (!) 111 16 96 %   09/14/22 1334 134/75 -- (!) 112 14 94 %   09/14/22 1331 -- -- (!) 109 17 95 %   09/14/22 1330 -- -- (!) 111 15 97 %   09/14/22 1326 -- -- (!) 109 11 94 %   09/14/22 1316 -- -- (!) 112 13 94 %   09/14/22 1315 126/71 -- (!) 112 12 94 %   09/14/22 1306 -- -- (!) 109 15 95 %   09/14/22 1304 131/77 -- (!) 107 13 97 %   09/14/22 1301 -- -- (!) 109 11 97 %   09/14/22 1300 -- -- (!) 109 18 97 %   09/14/22 1256 -- -- (!) 110 17 98 %   09/14/22 1246 -- -- (!) 109 (!) 32 97 %   09/14/22 1245 135/80 -- Sonia Garcia 111 12 94 %   09/14/22 1236 (!) 126/91 -- (!) 112 16 97 %   09/14/22 1234 101/80 98.3 °F (36.8 °C) (!) 116 20 98 %   09/14/22 1231 -- -- (!) 115 11 97 %   09/14/22 1230 -- -- (!) 116 10 97 %   09/14/22 1226 -- -- (!) 115 16 97 %   09/14/22 1216 -- -- (!) 113 9 99 %   09/14/22 1215 135/82 -- (!) 112 12 98 %   09/14/22 1206 -- -- (!) 112 12 97 %   09/14/22 1204 126/83 -- (!) 111 12 97 %   09/14/22 1201 -- -- (!) 112 11 96 %   09/14/22 1200 -- -- (!) 115 16 97 %   09/14/22 1156 -- -- (!) 110 11 96 %   09/14/22 1146 -- -- (!) 117 16 97 %   09/14/22 1145 106/63 -- (!) 109 9 96 %   09/14/22 1136 -- -- (!) 111 9 95 %   09/14/22 1134 133/74 -- (!) 110 9 95 %   09/14/22 1131 -- -- (!) 119 22 95 %   09/14/22 1130 -- -- (!) 113 12 95 %   09/14/22 1126 -- -- (!) 106 9 95 %   09/14/22 1115 137/81 -- (!) 114 11 95 %   09/14/22 1106 -- -- (!) 105 (!) 7 94 %   09/14/22 1104 92/69 -- (!) 105 10 95 %   09/14/22 1101 -- -- (!) 101 9 95 %   09/14/22 1100 -- -- (!) 102 8 94 %   09/14/22 1056 -- -- (!) 103 9 94 %   09/14/22 1046 -- -- (!) 106 12 94 %   09/14/22 1045 (!) 145/75 -- (!) 104 10 95 %   09/14/22 1036 -- -- (!) 105 13 95 %   09/14/22 1034 138/66 -- 97 16 95 %   09/14/22 1031 -- -- (!) 107 15 96 %   09/14/22 1030 -- -- (!) 101 14 95 %   09/14/22 1026 -- -- 99 14 95 %   09/14/22 1019 131/68 -- (!) 102 17 95 %   09/14/22 1016 -- -- (!) 103 16 96 %   09/14/22 1015 -- -- (!) 106 17 96 %   09/14/22 1006 -- -- (!) 112 20 94 %   09/14/22 1004 125/68 -- (!) 109 18 96 %   09/14/22 1001 -- -- (!) 112 16 96 %   09/14/22 1000 -- -- (!) 110 16 94 %   09/14/22 0956 -- -- (!) 113 14 96 %   09/14/22 0949 134/73 -- (!) 108 12 (!) 88 %   09/14/22 0946 -- -- (!) 105 15 --   09/14/22 0945 -- -- (!) 103 17 --   09/14/22 0936 -- -- (!) 105 14 --   09/14/22 0934 131/71 -- (!) 102 15 --   09/14/22 0931 -- -- (!) 101 11 --   09/14/22 0930 -- -- (!) 107 9 --   09/14/22 0926 -- -- (!) 106 15 --   09/14/22 0919 130/78 -- (!) 103 13 96 %   09/14/22 0916 -- -- (!) 107 16 96 %   09/14/22 0915 -- -- (!) 104 17 96 %   09/14/22 0906 -- -- 100 12 96 %   09/14/22 0904 110/61 -- 100 11 96 %   09/14/22 0901 -- -- (!) 102 11 96 %   09/14/22 0900 -- -- (!) 101 11 97 %   09/14/22 0856 -- -- 99 12 96 %   09/14/22 0849 136/77 -- (!) 101 10 96 %   09/14/22 0846 -- -- (!) 103 13 96 %   09/14/22 0845 -- -- 99 11 96 %   09/14/22 0836 -- -- 96 9 96 %   09/14/22 0834 128/73 -- 98 9 96 %   09/14/22 0831 -- -- 100 9 96 %   09/14/22 0830 -- -- (!) 101 13 96 %   09/14/22 0826 -- -- 87 11 96 %   09/14/22 0819 128/67 -- (!) 103 15 98 %   09/14/22 0816 -- -- (!) 106 13 97 %   09/14/22 0815 -- -- (!) 101 13 96 %   09/14/22 0806 -- -- 95 11 96 %   09/14/22 0804 126/72 -- 96 10 95 %   09/14/22 0801 -- -- 91 9 96 %   09/14/22 0800 -- -- 97 11 96 %   09/14/22 0756 -- -- 94 11 96 %   09/14/22 0752 -- -- -- -- 97 %   09/14/22 0749 129/69 -- 93 11 96 %   09/14/22 0746 -- -- 94 11 96 %   09/14/22 0745 -- -- 97 11 96 %   09/14/22 0736 -- -- 97 13 99 %   09/14/22 0734 127/70 -- 98 13 97 %   09/14/22 0731 -- -- 96 12 96 %   09/14/22 0730 -- -- 92 12 97 %   09/14/22 0726 -- -- 99 13 96 %   09/14/22 0719 114/69 -- (!) 104 9 97 %   09/14/22 0716 -- -- 87 14 97 %   09/14/22 0715 (!) 97/48 -- 100 15 97 %       Objective:      Patient Vitals for the past 24 hrs:   BP Temp Pulse Resp SpO2   09/16/22 0536 123/68 97.4 °F (36.3 °C) 88 16 100 %   09/16/22 0400 -- -- 85 -- --   09/16/22 0000 -- -- 80 -- --   09/15/22 2318 115/68 97.7 °F (36.5 °C) 88 16 99 %   09/15/22 2021 -- -- -- -- 100 %   09/15/22 2005 (!) 152/88 97.8 °F (36.6 °C) 95 16 100 %   09/15/22 1955 -- -- 80 -- --   09/15/22 1640 127/68 98.1 °F (36.7 °C) 83 16 90 %   09/15/22 1200 -- 98 °F (36.7 °C) -- -- --   09/15/22 1101 118/65 -- 82 14 95 %   09/15/22 1047 120/68 -- 97 18 95 %   09/15/22 1030 124/67 -- 88 11 96 %   09/15/22 1000 122/72 -- 93 13 95 %   09/15/22 0930 116/77 -- 100 12 95 %   09/15/22 0900 (!) 145/83 -- 87 11 97 %   09/15/22 0833 -- 97.7 °F (36.5 °C) -- -- --   09/15/22 0830 128/82 -- (!) 105 11 96 %   09/15/22 0800 117/71 -- 81 10 97 %   09/15/22 0730 122/75 -- 80 9 96 %     LABS: Hgb=7.8           Abdomen:  Soft. notdistended, bowel sounds present    moderately tender   Incision:  no significant drainage, no dehiscence, no significant erythema   DVT Evaluation:  No evidence of DVT seen on physical exam.  Negative Sherrill's sign. No cords or calf tenderness. No significant calf/ankle edema. Lab/Data Review: All lab results for the last 24 hours reviewed. Assessment:     Status postop:  Doing well postoperatively. Surgery discussed with patient and further management in hospital. Long term prognosis discussed    Drain minimal, expect to remove today. Plan: Mobilize, Postop care discussed including diet, ambulation, and actvitiy restrictions. Discharge instructions and questions answered.     Signed By: Tc Marin MD     September 16, 2022

## 2022-09-16 NOTE — ROUTINE PROCESS
Patient reports having rested well overnight. She is on Telemetry box #5, and in normal sinus rhythm. No clinical concerns noted.

## 2022-09-17 LAB
ALBUMIN SERPL-MCNC: 3.4 G/DL (ref 3.4–5)
ALBUMIN/GLOB SERPL: 1.3 {RATIO} (ref 0.8–1.7)
ALP SERPL-CCNC: 92 U/L (ref 45–117)
ALT SERPL-CCNC: 44 U/L (ref 13–56)
ANION GAP SERPL CALC-SCNC: 8 MMOL/L (ref 3–18)
APTT PPP: 29.9 SEC (ref 23–36.4)
AST SERPL-CCNC: 38 U/L (ref 10–38)
BASOPHILS # BLD: 0 K/UL (ref 0–0.1)
BASOPHILS NFR BLD: 0 % (ref 0–2)
BILIRUB SERPL-MCNC: 0.6 MG/DL (ref 0.2–1)
BUN SERPL-MCNC: 15 MG/DL (ref 7–18)
BUN/CREAT SERPL: 28 (ref 12–20)
CALCIUM SERPL-MCNC: 8.8 MG/DL (ref 8.5–10.1)
CHLORIDE SERPL-SCNC: 103 MMOL/L (ref 100–111)
CO2 SERPL-SCNC: 27 MMOL/L (ref 21–32)
CREAT SERPL-MCNC: 0.54 MG/DL (ref 0.6–1.3)
DIFFERENTIAL METHOD BLD: ABNORMAL
EOSINOPHIL # BLD: 0.2 K/UL (ref 0–0.4)
EOSINOPHIL NFR BLD: 4 % (ref 0–5)
ERYTHROCYTE [DISTWIDTH] IN BLOOD BY AUTOMATED COUNT: 14.8 % (ref 11.6–14.5)
FIBRINOGEN PPP-MCNC: 632 MG/DL (ref 210–451)
GLOBULIN SER CALC-MCNC: 2.7 G/DL (ref 2–4)
GLUCOSE BLD STRIP.AUTO-MCNC: 107 MG/DL (ref 70–110)
GLUCOSE BLD STRIP.AUTO-MCNC: 126 MG/DL (ref 70–110)
GLUCOSE BLD STRIP.AUTO-MCNC: 94 MG/DL (ref 70–110)
GLUCOSE SERPL-MCNC: 77 MG/DL (ref 74–99)
HCT VFR BLD AUTO: 26.6 % (ref 35–45)
HGB BLD-MCNC: 8.5 G/DL (ref 12–16)
IMM GRANULOCYTES # BLD AUTO: 0.1 K/UL (ref 0–0.04)
IMM GRANULOCYTES NFR BLD AUTO: 3 % (ref 0–0.5)
INR PPP: 1 (ref 0.8–1.2)
LIPASE SERPL-CCNC: 102 U/L (ref 73–393)
LYMPHOCYTES # BLD: 1.3 K/UL (ref 0.9–3.6)
LYMPHOCYTES NFR BLD: 26 % (ref 21–52)
MCH RBC QN AUTO: 28.7 PG (ref 24–34)
MCHC RBC AUTO-ENTMCNC: 32 G/DL (ref 31–37)
MCV RBC AUTO: 89.9 FL (ref 78–100)
MONOCYTES # BLD: 0.3 K/UL (ref 0.05–1.2)
MONOCYTES NFR BLD: 7 % (ref 3–10)
NEUTS SEG # BLD: 2.9 K/UL (ref 1.8–8)
NEUTS SEG NFR BLD: 60 % (ref 40–73)
NRBC # BLD: 0 K/UL (ref 0–0.01)
NRBC BLD-RTO: 0 PER 100 WBC
PLATELET # BLD AUTO: 198 K/UL (ref 135–420)
PMV BLD AUTO: 9.2 FL (ref 9.2–11.8)
POTASSIUM SERPL-SCNC: 3.7 MMOL/L (ref 3.5–5.5)
PROT SERPL-MCNC: 6.1 G/DL (ref 6.4–8.2)
PROTHROMBIN TIME: 13.5 SEC (ref 11.5–15.2)
RBC # BLD AUTO: 2.96 M/UL (ref 4.2–5.3)
SODIUM SERPL-SCNC: 138 MMOL/L (ref 136–145)
WBC # BLD AUTO: 4.9 K/UL (ref 4.6–13.2)

## 2022-09-17 PROCEDURE — 85610 PROTHROMBIN TIME: CPT

## 2022-09-17 PROCEDURE — 74011250636 HC RX REV CODE- 250/636: Performed by: INTERNAL MEDICINE

## 2022-09-17 PROCEDURE — 97161 PT EVAL LOW COMPLEX 20 MIN: CPT

## 2022-09-17 PROCEDURE — 74011250637 HC RX REV CODE- 250/637: Performed by: INTERNAL MEDICINE

## 2022-09-17 PROCEDURE — 82962 GLUCOSE BLOOD TEST: CPT

## 2022-09-17 PROCEDURE — 85730 THROMBOPLASTIN TIME PARTIAL: CPT

## 2022-09-17 PROCEDURE — 74011250637 HC RX REV CODE- 250/637: Performed by: OBSTETRICS & GYNECOLOGY

## 2022-09-17 PROCEDURE — 74011000250 HC RX REV CODE- 250: Performed by: INTERNAL MEDICINE

## 2022-09-17 PROCEDURE — 36415 COLL VENOUS BLD VENIPUNCTURE: CPT

## 2022-09-17 PROCEDURE — 74011636637 HC RX REV CODE- 636/637: Performed by: HOSPITALIST

## 2022-09-17 PROCEDURE — 85384 FIBRINOGEN ACTIVITY: CPT

## 2022-09-17 PROCEDURE — 74011000258 HC RX REV CODE- 258: Performed by: INTERNAL MEDICINE

## 2022-09-17 PROCEDURE — 80053 COMPREHEN METABOLIC PANEL: CPT

## 2022-09-17 PROCEDURE — 97530 THERAPEUTIC ACTIVITIES: CPT

## 2022-09-17 PROCEDURE — 97116 GAIT TRAINING THERAPY: CPT

## 2022-09-17 PROCEDURE — 65270000046 HC RM TELEMETRY

## 2022-09-17 PROCEDURE — 74011250637 HC RX REV CODE- 250/637: Performed by: UROLOGY

## 2022-09-17 PROCEDURE — P9047 ALBUMIN (HUMAN), 25%, 50ML: HCPCS | Performed by: INTERNAL MEDICINE

## 2022-09-17 PROCEDURE — 85025 COMPLETE CBC W/AUTO DIFF WBC: CPT

## 2022-09-17 PROCEDURE — 83690 ASSAY OF LIPASE: CPT

## 2022-09-17 RX ADMIN — HYOSCYAMINE SULFATE 0.25 MG: 0.12 TABLET ORAL; SUBLINGUAL at 10:38

## 2022-09-17 RX ADMIN — HYOSCYAMINE SULFATE 0.25 MG: 0.12 TABLET ORAL; SUBLINGUAL at 18:24

## 2022-09-17 RX ADMIN — NORTRIPTYLINE HYDROCHLORIDE 10 MG: 10 CAPSULE ORAL at 22:02

## 2022-09-17 RX ADMIN — HYDROCODONE BITARTRATE AND ACETAMINOPHEN 1 TABLET: 10; 325 TABLET ORAL at 10:38

## 2022-09-17 RX ADMIN — ALBUMIN (HUMAN) 12.5 G: 0.25 INJECTION, SOLUTION INTRAVENOUS at 09:34

## 2022-09-17 RX ADMIN — PHENAZOPYRIDINE HYDROCHLORIDE 200 MG: 100 TABLET ORAL at 18:23

## 2022-09-17 RX ADMIN — SODIUM CHLORIDE, PRESERVATIVE FREE 10 ML: 5 INJECTION INTRAVENOUS at 06:24

## 2022-09-17 RX ADMIN — PHENAZOPYRIDINE HYDROCHLORIDE 200 MG: 100 TABLET ORAL at 12:06

## 2022-09-17 RX ADMIN — CLONAZEPAM 0.5 MG: 0.5 TABLET ORAL at 09:33

## 2022-09-17 RX ADMIN — HYDROCODONE BITARTRATE AND ACETAMINOPHEN 1 TABLET: 10; 325 TABLET ORAL at 18:23

## 2022-09-17 RX ADMIN — ONDANSETRON 4 MG: 2 INJECTION INTRAMUSCULAR; INTRAVENOUS at 13:49

## 2022-09-17 RX ADMIN — VIBEGRON 75 MG: 75 TABLET, FILM COATED ORAL at 09:00

## 2022-09-17 RX ADMIN — HYOSCYAMINE SULFATE 0.25 MG: 0.12 TABLET ORAL; SUBLINGUAL at 22:02

## 2022-09-17 RX ADMIN — HYDROCODONE BITARTRATE AND ACETAMINOPHEN 1 TABLET: 10; 325 TABLET ORAL at 01:57

## 2022-09-17 RX ADMIN — SODIUM CHLORIDE, PRESERVATIVE FREE 10 ML: 5 INJECTION INTRAVENOUS at 22:03

## 2022-09-17 RX ADMIN — CLONAZEPAM 0.5 MG: 0.5 TABLET ORAL at 14:31

## 2022-09-17 RX ADMIN — SODIUM CHLORIDE, PRESERVATIVE FREE 10 ML: 5 INJECTION INTRAVENOUS at 14:11

## 2022-09-17 RX ADMIN — HYOSCYAMINE SULFATE 0.12 MG: 0.12 TABLET ORAL; SUBLINGUAL at 14:34

## 2022-09-17 RX ADMIN — Medication 10 MG: at 22:02

## 2022-09-17 RX ADMIN — HYOSCYAMINE SULFATE 0.25 MG: 0.12 TABLET ORAL; SUBLINGUAL at 01:57

## 2022-09-17 RX ADMIN — PHENAZOPYRIDINE HYDROCHLORIDE 200 MG: 100 TABLET ORAL at 09:33

## 2022-09-17 RX ADMIN — HYOSCYAMINE SULFATE 0.12 MG: 0.12 TABLET ORAL; SUBLINGUAL at 14:31

## 2022-09-17 RX ADMIN — CEFTRIAXONE 1 G: 1 INJECTION, POWDER, FOR SOLUTION INTRAMUSCULAR; INTRAVENOUS at 10:42

## 2022-09-17 RX ADMIN — ONDANSETRON 4 MG: 2 INJECTION INTRAMUSCULAR; INTRAVENOUS at 06:24

## 2022-09-17 RX ADMIN — VITAM B12 100 MCG: 100 TAB at 09:33

## 2022-09-17 RX ADMIN — LEVOTHYROXINE SODIUM 50 MCG: 0.05 TABLET ORAL at 06:24

## 2022-09-17 RX ADMIN — HYOSCYAMINE SULFATE 0.25 MG: 0.12 TABLET ORAL; SUBLINGUAL at 06:24

## 2022-09-17 RX ADMIN — ALBUMIN (HUMAN) 12.5 G: 0.25 INJECTION, SOLUTION INTRAVENOUS at 21:59

## 2022-09-17 RX ADMIN — HYDROCODONE BITARTRATE AND ACETAMINOPHEN 1 TABLET: 10; 325 TABLET ORAL at 22:02

## 2022-09-17 RX ADMIN — HYDROCODONE BITARTRATE AND ACETAMINOPHEN 1 TABLET: 10; 325 TABLET ORAL at 14:30

## 2022-09-17 RX ADMIN — CLONAZEPAM 0.5 MG: 0.5 TABLET ORAL at 21:59

## 2022-09-17 RX ADMIN — HYDROCODONE BITARTRATE AND ACETAMINOPHEN 1 TABLET: 10; 325 TABLET ORAL at 06:24

## 2022-09-17 RX ADMIN — BUPRENORPHINE HCL 12 MG: 8 TABLET SUBLINGUAL at 09:33

## 2022-09-17 NOTE — PROGRESS NOTES
2100 Pt complaining of \"excruciating pain. \" Patient states that nothing that has been provided for pain thus far has been working. Patient has been given Norco and hyoscyamine at approximately 1800 and 1830 respectively as well as lidocaine jelly at 1945. This nurse explained to patient that there are no available narcotics due to be given at this time and next dose could be given at approximately 2200. Tylenol and ice packs offered to patient at this time an patient accepted. Per charge nurse, patients  upset and stated \"I will be calling the patient advocate because you are doing enough for my wifes pain. \" This nurse was in room with patient providing all available medications at the time of  call. Charge nurse relayed message and offered to call Dr. Kim Vo for break through pain medication. 2200 Patient continues to have 7/10 pain after tylenol administration. Patient given PRN pain and antispasmodic medications. Patient requested to be awaken every 4hrs for pain medications    0200 Patient asleeping at this time. Upon awaking patient she states that pain is 4/10. Pain and antispasmodic medication offered to patient and she accepted. 0600 Patient pain controlled much better throughout the night. Patient educated on asking for pain medication well before the pain becomes unbearable. 0720 Bedside shift change report given to REYNALDO Harper (oncoming nurse) by Tom Hays RN (offgoing nurse).  Report included the following information SBAR, Kardex, Procedure Summary, Intake/Output, MAR, Recent Results, and Cardiac Rhythm SR .

## 2022-09-17 NOTE — PROGRESS NOTES
Problem: Falls - Risk of  Goal: *Absence of Falls  Description: Document Shannan Cox Fall Risk and appropriate interventions in the flowsheet. Outcome: Progressing Towards Goal  Note: Fall Risk Interventions:  Mobility Interventions: Assess mobility with egress test, Communicate number of staff needed for ambulation/transfer    Mentation Interventions: Bed/chair exit alarm    Medication Interventions: Evaluate medications/consider consulting pharmacy    Elimination Interventions: Call light in reach, Patient to call for help with toileting needs, Toilet paper/wipes in reach, Toileting schedule/hourly rounds              Problem: Patient Education: Go to Patient Education Activity  Goal: Patient/Family Education  Outcome: Progressing Towards Goal     Problem: General Medical Care Plan  Goal: *Vital signs within specified parameters  Outcome: Progressing Towards Goal  Goal: *Labs within defined limits  Outcome: Progressing Towards Goal  Goal: *Absence of infection signs and symptoms  Outcome: Progressing Towards Goal  Goal: *Optimal pain control at patient's stated goal  Outcome: Progressing Towards Goal  Goal: *Skin integrity maintained  Outcome: Progressing Towards Goal  Goal: *Fluid volume balance  Outcome: Progressing Towards Goal  Goal: *Optimize nutritional status  Outcome: Progressing Towards Goal  Goal: *Anxiety reduced or absent  Outcome: Progressing Towards Goal  Goal: *Progressive mobility and function (eg: ADL's)  Outcome: Progressing Towards Goal     Problem: Patient Education: Go to Patient Education Activity  Goal: Patient/Family Education  Outcome: Progressing Towards Goal     Problem: Pressure Injury - Risk of  Goal: *Prevention of pressure injury  Description: Document Keith Scale and appropriate interventions in the flowsheet. Outcome: Progressing Towards Goal  Note: Pressure Injury Interventions:             Activity Interventions: Pressure redistribution bed/mattress(bed type)    Mobility Interventions: Pressure redistribution bed/mattress (bed type)    Nutrition Interventions: Document food/fluid/supplement intake                     Problem: Patient Education: Go to Patient Education Activity  Goal: Patient/Family Education  Outcome: Progressing Towards Goal

## 2022-09-17 NOTE — PROGRESS NOTES
Gynecology Progress Note    Patient post-op day 5 from Procedure(s):  TOTAL ABDOMINAL HYSTERECTOMY, RIGHT URETERAL NEOCYSTOSTOMY WITH PSOAS HITCH WITH REPAIR OF BLADDER INJURY still with some pain from bladder spasms but better control today. Tolerating regular diet. + Flatus. FTG. She has not been oob since her surgery. Vitals:  Blood pressure 132/83, pulse 94, temperature 98.3 °F (36.8 °C), resp. rate 18, height 5' 4\" (1.626 m), weight 99.8 kg (220 lb), last menstrual period 2022, SpO2 97 %. Temp (24hrs), Av.2 °F (36.8 °C), Min:97.7 °F (36.5 °C), Max:98.5 °F (36.9 °C)        Exam:  Patient without distress. Abdomen soft,  approp ttp                Incision dry and clean without erythema. Lower extremities are negative for swelling, cords, or tenderness. Lab/Data Review: All lab results for the last 24 hours reviewed. Assessment and Plan:  Patient appears to be having uncomplicated post Procedure(s):  TOTAL ABDOMINAL HYSTERECTOMY, RIGHT URETERAL NEOCYSTOSTOMY WITH PSOAS HITCH WITH REPAIR OF BLADDER INJURY     Anemia - hgb stable > 7. No need for transfusion at this time. Uro - care per Urology. Home with catheter. FU plan per Dr. Ishan Mckeon. Continue medical management of bladder spasms. Gyn - s/p ELIANE. Pt to follow up with Dr. Pritesh Grover on discharge  CV - stable  Pulm - stable  GI - regular diet, protonix prophylaxis  Endocrine - hypothyroid - cont. Synthroid  PT consult for patient mobilization.      Bladimir Smart M.D.

## 2022-09-17 NOTE — ROUTINE PROCESS
Patient complains of severe urethral pain. Cleaned with CHG wipes thoroughly, and the Zylocaine jelly applied. Night shift RN made aware of patient's pain.
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
## 2022-09-18 LAB
GLUCOSE BLD STRIP.AUTO-MCNC: 101 MG/DL (ref 70–110)
GLUCOSE BLD STRIP.AUTO-MCNC: 125 MG/DL (ref 70–110)
HCT VFR BLD AUTO: 26.5 % (ref 35–45)
HGB BLD-MCNC: 8.6 G/DL (ref 12–16)

## 2022-09-18 PROCEDURE — 65270000046 HC RM TELEMETRY

## 2022-09-18 PROCEDURE — 82962 GLUCOSE BLOOD TEST: CPT

## 2022-09-18 PROCEDURE — 74011250637 HC RX REV CODE- 250/637: Performed by: OBSTETRICS & GYNECOLOGY

## 2022-09-18 PROCEDURE — 74011000250 HC RX REV CODE- 250: Performed by: INTERNAL MEDICINE

## 2022-09-18 PROCEDURE — 74011250636 HC RX REV CODE- 250/636: Performed by: INTERNAL MEDICINE

## 2022-09-18 PROCEDURE — P9047 ALBUMIN (HUMAN), 25%, 50ML: HCPCS | Performed by: INTERNAL MEDICINE

## 2022-09-18 PROCEDURE — 74011250637 HC RX REV CODE- 250/637: Performed by: INTERNAL MEDICINE

## 2022-09-18 PROCEDURE — 74011000258 HC RX REV CODE- 258: Performed by: INTERNAL MEDICINE

## 2022-09-18 PROCEDURE — 85018 HEMOGLOBIN: CPT

## 2022-09-18 PROCEDURE — 36415 COLL VENOUS BLD VENIPUNCTURE: CPT

## 2022-09-18 PROCEDURE — 74011250637 HC RX REV CODE- 250/637: Performed by: UROLOGY

## 2022-09-18 RX ADMIN — CLONAZEPAM 0.5 MG: 0.5 TABLET ORAL at 08:28

## 2022-09-18 RX ADMIN — HYOSCYAMINE SULFATE 0.25 MG: 0.12 TABLET ORAL; SUBLINGUAL at 03:28

## 2022-09-18 RX ADMIN — PHENAZOPYRIDINE HYDROCHLORIDE 200 MG: 100 TABLET ORAL at 08:28

## 2022-09-18 RX ADMIN — NORTRIPTYLINE HYDROCHLORIDE 10 MG: 10 CAPSULE ORAL at 22:00

## 2022-09-18 RX ADMIN — ALBUMIN (HUMAN) 12.5 G: 0.25 INJECTION, SOLUTION INTRAVENOUS at 08:28

## 2022-09-18 RX ADMIN — LEVOTHYROXINE SODIUM 50 MCG: 0.05 TABLET ORAL at 06:03

## 2022-09-18 RX ADMIN — CEFTRIAXONE 1 G: 1 INJECTION, POWDER, FOR SOLUTION INTRAMUSCULAR; INTRAVENOUS at 09:57

## 2022-09-18 RX ADMIN — PHENAZOPYRIDINE HYDROCHLORIDE 200 MG: 100 TABLET ORAL at 17:58

## 2022-09-18 RX ADMIN — VITAM B12 100 MCG: 100 TAB at 08:28

## 2022-09-18 RX ADMIN — HYDROCODONE BITARTRATE AND ACETAMINOPHEN 1 TABLET: 10; 325 TABLET ORAL at 08:28

## 2022-09-18 RX ADMIN — HYOSCYAMINE SULFATE 0.25 MG: 0.12 TABLET ORAL; SUBLINGUAL at 16:21

## 2022-09-18 RX ADMIN — ALBUMIN (HUMAN) 12.5 G: 0.25 INJECTION, SOLUTION INTRAVENOUS at 21:54

## 2022-09-18 RX ADMIN — HYOSCYAMINE SULFATE 0.25 MG: 0.12 TABLET ORAL; SUBLINGUAL at 08:28

## 2022-09-18 RX ADMIN — HYDROCODONE BITARTRATE AND ACETAMINOPHEN 1 TABLET: 10; 325 TABLET ORAL at 20:40

## 2022-09-18 RX ADMIN — HYDROCODONE BITARTRATE AND ACETAMINOPHEN 1 TABLET: 10; 325 TABLET ORAL at 12:26

## 2022-09-18 RX ADMIN — VIBEGRON 75 MG: 75 TABLET, FILM COATED ORAL at 09:00

## 2022-09-18 RX ADMIN — Medication 10 MG: at 20:40

## 2022-09-18 RX ADMIN — PHENAZOPYRIDINE HYDROCHLORIDE 200 MG: 100 TABLET ORAL at 12:26

## 2022-09-18 RX ADMIN — HYOSCYAMINE SULFATE 0.12 MG: 0.12 TABLET ORAL; SUBLINGUAL at 20:41

## 2022-09-18 RX ADMIN — HYDROCODONE BITARTRATE AND ACETAMINOPHEN 1 TABLET: 10; 325 TABLET ORAL at 03:38

## 2022-09-18 RX ADMIN — CLONAZEPAM 0.5 MG: 0.5 TABLET ORAL at 20:40

## 2022-09-18 RX ADMIN — CLONAZEPAM 0.5 MG: 0.5 TABLET ORAL at 14:51

## 2022-09-18 RX ADMIN — SODIUM CHLORIDE, PRESERVATIVE FREE 10 ML: 5 INJECTION INTRAVENOUS at 06:03

## 2022-09-18 RX ADMIN — HYOSCYAMINE SULFATE 0.25 MG: 0.12 TABLET ORAL; SUBLINGUAL at 12:26

## 2022-09-18 RX ADMIN — SODIUM CHLORIDE, PRESERVATIVE FREE 10 ML: 5 INJECTION INTRAVENOUS at 20:46

## 2022-09-18 RX ADMIN — HYDROCODONE BITARTRATE AND ACETAMINOPHEN 1 TABLET: 10; 325 TABLET ORAL at 16:21

## 2022-09-18 RX ADMIN — ACETAMINOPHEN 650 MG: 325 TABLET ORAL at 20:40

## 2022-09-19 VITALS
HEIGHT: 64 IN | WEIGHT: 230.82 LBS | OXYGEN SATURATION: 97 % | BODY MASS INDEX: 39.41 KG/M2 | SYSTOLIC BLOOD PRESSURE: 159 MMHG | RESPIRATION RATE: 18 BRPM | HEART RATE: 80 BPM | DIASTOLIC BLOOD PRESSURE: 96 MMHG | TEMPERATURE: 98.1 F

## 2022-09-19 PROCEDURE — 74011636637 HC RX REV CODE- 636/637: Performed by: HOSPITALIST

## 2022-09-19 PROCEDURE — P9047 ALBUMIN (HUMAN), 25%, 50ML: HCPCS | Performed by: INTERNAL MEDICINE

## 2022-09-19 PROCEDURE — 74011250636 HC RX REV CODE- 250/636: Performed by: INTERNAL MEDICINE

## 2022-09-19 PROCEDURE — 74011000258 HC RX REV CODE- 258: Performed by: INTERNAL MEDICINE

## 2022-09-19 PROCEDURE — 74011250637 HC RX REV CODE- 250/637: Performed by: OBSTETRICS & GYNECOLOGY

## 2022-09-19 PROCEDURE — 74011250637 HC RX REV CODE- 250/637: Performed by: UROLOGY

## 2022-09-19 PROCEDURE — 74011000250 HC RX REV CODE- 250: Performed by: INTERNAL MEDICINE

## 2022-09-19 PROCEDURE — 74011250637 HC RX REV CODE- 250/637: Performed by: INTERNAL MEDICINE

## 2022-09-19 RX ORDER — VIBEGRON 75 MG/1
75 TABLET, FILM COATED ORAL DAILY
Qty: 60 TABLET | Refills: 1 | Status: SHIPPED | OUTPATIENT
Start: 2022-09-20

## 2022-09-19 RX ORDER — HYOSCYAMINE SULFATE 0.12 MG/1
.125-.25 TABLET SUBLINGUAL
Qty: 90 TABLET | Refills: 2 | Status: SHIPPED | OUTPATIENT
Start: 2022-09-19

## 2022-09-19 RX ADMIN — ALBUMIN (HUMAN) 12.5 G: 0.25 INJECTION, SOLUTION INTRAVENOUS at 08:10

## 2022-09-19 RX ADMIN — VIBEGRON 75 MG: 75 TABLET, FILM COATED ORAL at 09:00

## 2022-09-19 RX ADMIN — HYDROCODONE BITARTRATE AND ACETAMINOPHEN 1 TABLET: 10; 325 TABLET ORAL at 04:22

## 2022-09-19 RX ADMIN — ONDANSETRON 4 MG: 2 INJECTION INTRAMUSCULAR; INTRAVENOUS at 08:47

## 2022-09-19 RX ADMIN — HYOSCYAMINE SULFATE 0.25 MG: 0.12 TABLET ORAL; SUBLINGUAL at 04:22

## 2022-09-19 RX ADMIN — CLONAZEPAM 0.5 MG: 0.5 TABLET ORAL at 08:09

## 2022-09-19 RX ADMIN — HYOSCYAMINE SULFATE 0.12 MG: 0.12 TABLET ORAL; SUBLINGUAL at 08:47

## 2022-09-19 RX ADMIN — SODIUM CHLORIDE, PRESERVATIVE FREE 10 ML: 5 INJECTION INTRAVENOUS at 06:00

## 2022-09-19 RX ADMIN — VITAM B12 100 MCG: 100 TAB at 08:09

## 2022-09-19 RX ADMIN — HYOSCYAMINE SULFATE 0.12 MG: 0.12 TABLET ORAL; SUBLINGUAL at 08:35

## 2022-09-19 RX ADMIN — LEVOTHYROXINE SODIUM 50 MCG: 0.05 TABLET ORAL at 05:59

## 2022-09-19 RX ADMIN — PHENAZOPYRIDINE HYDROCHLORIDE 200 MG: 100 TABLET ORAL at 08:09

## 2022-09-19 RX ADMIN — CEFTRIAXONE 1 G: 1 INJECTION, POWDER, FOR SOLUTION INTRAMUSCULAR; INTRAVENOUS at 10:27

## 2022-09-19 RX ADMIN — BUPRENORPHINE HCL 12 MG: 8 TABLET SUBLINGUAL at 08:10

## 2022-09-19 RX ADMIN — HYDROCODONE BITARTRATE AND ACETAMINOPHEN 1 TABLET: 10; 325 TABLET ORAL at 08:35

## 2022-09-19 NOTE — PROGRESS NOTES
Problem: Falls - Risk of  Goal: *Absence of Falls  Description: Document Silvestre Rachel Fall Risk and appropriate interventions in the flowsheet.   Outcome: Progressing Towards Goal  Note: Fall Risk Interventions:  Mobility Interventions: Bed/chair exit alarm, Patient to call before getting OOB    Mentation Interventions: Bed/chair exit alarm, More frequent rounding, Increase mobility    Medication Interventions: Bed/chair exit alarm, Patient to call before getting OOB    Elimination Interventions: Bed/chair exit alarm, Call light in reach

## 2022-09-19 NOTE — DISCHARGE SUMMARY
Gynecology Surgical Discharge Summary     Name: Elise Montejo MRN: [id number]  SSN: [id number]    YOB: 1975  Age: 40 y.o. Sex: female      Admit date: 9/12/2022    Discharge Date: 9/19/2022      Attending Physician: Richie Abrams MD     Admission Diagnoses: Fibroids, Menorrhagia, and Pelvic pain    Discharge Diagnoses: Principal Problem:    S/P abdominal hysterectomy (9/12/2022)    Active Problems:    Fibroid uterus (9/12/2022)      Hypovolemic shock ([address] (9/12/2022)      Sinus tachycardia (9/12/2022)      Bladder injury (9/12/2022)      Hematuria (9/12/2022)      Bladder spasm (9/14/2022)         Procedures: Total Abdominal Hysterectomy with repair of bladder by uro and psoas hitch of ureter      Hospital Course: Hospital course was complicated by anemia of blood loss and hypotension requiring icu admit for blood products and bp control. After pod 3 was transferred to floor where she continued to recover well and was discharged home on pod 7 to see me in office. Is on medication for bladder spasms and will follow with urology as well has burnette in place. Significant Diagnostic Studies:   Recent Results (from the past 24 hour(s))   GLUCOSE, POC    Collection Time: 09/18/22 11:37 AM   Result Value Ref Range    Glucose (POC) 125 (H) 70 - 110 mg/dL       Patient Instructions:   Current Discharge Medication List        START taking these medications    Details   hyoscyamine SL (LEVSIN/SL) 0.125 mg SL tablet 1-2 Tablets by SubLINGual route every four (4) hours as needed for PRN Reason (Other) (bladder spasms). Qty: 90 Tablet, Refills: 2      vibegron (Gemtesa) 75 mg tablet Take 1 Tablet by mouth daily.   Qty: 60 Tablet, Refills: 1           CONTINUE these medications which have NOT CHANGED    Details   nortriptyline (PAMELOR) 50 mg capsule Take 50 mg by mouth two (2) times a day.      gabapentin (NEURONTIN) 300 mg capsule TAKE ONE CAPSULE BY MOUTH NIGHT BEFORE PROCEDURE      levothyroxine (SYNTHROID) 50 mcg tablet levothyroxine 50 mcg tablet   TAKE 1 TABLET BY MOUTH EVERY DAY      buprenorphine-naloxone 8-2 mg subl 1.5 Tablets by SubLINGual route daily. Indications: dependence on opioid-type drugs, vicodin addiction from fibromyalgia    Comments: <!--EPICS-->.<!--EPICE-->        clonazepam (KLONOPIN PO) Take 0.5 mg by mouth three (3) times daily. Indications: anxiety      multivitamin, tx-iron-ca-min (THERA-M w/ IRON) 9 mg iron-400 mcg tab tablet Take 1 Tablet by mouth daily. Indications: 2 gummies centrum      cyanocobalamin (VITAMIN B12) 100 mcg tablet Take 100 mcg by mouth daily. Indications: 2 gummies      vit B Cmplx 3-FA-Vit C-Biotin (NEPHRO EVELYN RX) 1- mg-mg-mcg tablet Take 1 Tablet by mouth daily. tumeric-ging-olive-oreg-capryl 100 mg-150 mg- 50 mg-150 mg cap Take  by mouth.      miconazole (MICOTIN) 2 % topical cream Apply  to affected area two (2) times a day. Qty: 15 g, Refills: 0      ibuprofen (ADVIL PO) Take 800 mg by mouth two (2) times daily as needed for Pain. Activity: No sex, douching, or tampons for 6 weeks or as directed by your physician. No heavy lifting for 6 weeks. No driving while taking pain medication. Diet: Resume pre-hospital diet  Wound Care: Keep wound clean and dry    Follow-up Appointments   Procedures    FOLLOW UP VISIT Appointment in: One Week     Standing Status:   Standing     Number of Occurrences:   1     Order Specific Question:   Appointment in     Answer:    One Week        Signed By:  Rhina Hines MD     September 19, 2022

## 2022-09-19 NOTE — PROGRESS NOTES
Gynecology Progress Note    Patient doing well post-op day 7 from Procedure(s):  TOTAL ABDOMINAL HYSTERECTOMY, RIGHT URETERAL NEOCYSTOSTOMY WITH PSOAS HITCH WITH REPAIR OF BLADDER INJURY without significant complaints. Pain controlled on current medication. Patient is passing flatus. Vitals:  Blood pressure 130/84, pulse 90, temperature 98 °F (36.7 °C), resp. rate 18, height 5' 4\" (1.626 m), weight 104.7 kg (230 lb 13.2 oz), last menstrual period 2022, SpO2 98 %. Temp (24hrs), Av.1 °F (36.7 °C), Min:97.8 °F (36.6 °C), Max:98.4 °F (36.9 °C)        Exam:  Patient without distress. Abdomen soft,  nontender. Incision dry and clean without erythema. Lower extremities are negative for swelling, cords, or tenderness. Labs:   Recent Results (from the past 24 hour(s))   GLUCOSE, POC    Collection Time: 22 11:37 AM   Result Value Ref Range    Glucose (POC) 125 (H) 70 - 110 mg/dL       Assessment and Plan:  Patient appears to be having uncomplicated post Procedure(s):  TOTAL ABDOMINAL HYSTERECTOMY, RIGHT URETERAL NEOCYSTOSTOMY WITH PSOAS HITCH WITH REPAIR OF BLADDER INJURY course. Discharge home today on gemtessa and hyscolamine. Continue burnette, will follow with me in a week.  Has pain meds at home

## 2022-09-19 NOTE — PROGRESS NOTES
Problem: Falls - Risk of  Goal: *Absence of Falls  Description: Document Leilani Flores Fall Risk and appropriate interventions in the flowsheet. Outcome: Progressing Towards Goal  Note: Fall Risk Interventions:  Mobility Interventions: Bed/chair exit alarm, OT consult for ADLs, Patient to call before getting OOB, PT Consult for mobility concerns, PT Consult for assist device competence    Mentation Interventions: Bed/chair exit alarm, More frequent rounding, Increase mobility    Medication Interventions: Bed/chair exit alarm, Patient to call before getting OOB, Teach patient to arise slowly    Elimination Interventions: Call light in reach, Patient to call for help with toileting needs              Problem: Patient Education: Go to Patient Education Activity  Goal: Patient/Family Education  Outcome: Progressing Towards Goal     Problem: General Medical Care Plan  Goal: *Vital signs within specified parameters  Outcome: Progressing Towards Goal  Goal: *Labs within defined limits  Outcome: Progressing Towards Goal  Goal: *Absence of infection signs and symptoms  Outcome: Progressing Towards Goal  Goal: *Optimal pain control at patient's stated goal  Outcome: Progressing Towards Goal  Goal: *Skin integrity maintained  Outcome: Progressing Towards Goal  Goal: *Fluid volume balance  Outcome: Progressing Towards Goal  Goal: *Optimize nutritional status  Outcome: Progressing Towards Goal  Goal: *Anxiety reduced or absent  Outcome: Progressing Towards Goal  Goal: *Progressive mobility and function (eg: ADL's)  Outcome: Progressing Towards Goal     Problem: Pressure Injury - Risk of  Goal: *Prevention of pressure injury  Description: Document Keith Scale and appropriate interventions in the flowsheet. Outcome: Progressing Towards Goal  Note: Pressure Injury Interventions:             Activity Interventions: Pressure redistribution bed/mattress(bed type), PT/OT evaluation    Mobility Interventions: HOB 30 degrees or less, Pressure redistribution bed/mattress (bed type), PT/OT evaluation    Nutrition Interventions: Document food/fluid/supplement intake    Friction and Shear Interventions: Minimize layers                Problem: Patient Education: Go to Patient Education Activity  Goal: Patient/Family Education  Outcome: Progressing Towards Goal     Problem: Patient Education: Go to Patient Education Activity  Goal: Patient/Family Education  Outcome: Progressing Towards Goal

## 2022-09-19 NOTE — PROGRESS NOTES
D/C PLan: Home with [facility] and RW    Noted therapy recommendation. CM Met with pt/family and explained CM role and to discuss the plan of care. Pt and family are in agreement with therapy recommendations. Offered freedom of choice for SNF/HH. Provided a list of area agencies/facilities to refer to to assist family with making a determiniation. Patient chose [facility]. CM delivered RW to patient at bedside. Spouse at bedside ready to transport patient home. Care Management Interventions  PCP Verified by CM: Yes (Dr. Anatoliy An. )  Mode of Transport at Discharge:  Other (see comment) (family to transport. )  Transition of Care Consult (CM Consult): [address]: Yes  Discharge Durable Medical Equipment: Yes  Physical Therapy Consult: No  Occupational Therapy Consult: Yes  Speech Therapy Consult: No  Support Systems: Spouse/Significant Other, Child(sergio)  Confirm Follow Up Transport: Family  The Plan for Transition of Care is Related to the Following Treatment Goals : home w/ family assistance  The Patient and/or Patient Representative was Provided with a Choice of Provider and Agrees with the Discharge Plan?: Yes (Pt is alert and oriented.)  Freedom of Choice List was Provided with Basic Dialogue that Supports the Patient's Individualized Plan of Care/Goals, Treatment Preferences and Shares the Quality Data Associated with the Providers?:  (n/a)  Discharge Location  Patient Expects to be Discharged to[de-identified] Home with family assistance

## 2022-09-19 NOTE — PROGRESS NOTES
0720 Bedside shift change report given to Omi Mckee RN (oncoming nurse) by Sima Mota RN (offgoing nurse). Report included the following information SBAR, Kardex, Intake/Output, MAR, and Recent Results.      1153 D/C instructions went over with patient with no questions or concerns

## 2022-09-19 NOTE — PROGRESS NOTES
Comprehensive Nutrition Assessment    Type and Reason for Visit: Initial, RD nutrition re-screen/LOS    Nutrition Recommendations/Plan:   Continue with POC     Malnutrition Assessment:  Malnutrition Status:  No malnutrition (09/19/22 1151)      Nutrition Assessment:    Pt with fibroid uterus s/p total abdominal hysterectomy. Developed hypotension. PO has improved from 26-75% to %. No weight lost noted per chart    Nutrition Related Findings:    Labs and meds: Vit b12, synthroid. BM 9/15. Wound Type: Surgical incision    Current Nutrition Intake & Therapies:  Average Meal Intake: %  Average Supplement Intake: None ordered  ADULT DIET Regular    Anthropometric Measures:  Height: 5' 4\" (162.6 cm)  Ideal Body Weight (IBW): 120 lbs (55 kg)     Current Body Wt:  104.7 kg (230 lb 13.2 oz), 192.4 % IBW. Bed scale  Current BMI (kg/m2): 39.6  Usual Body Weight: 86.2 kg (190 lb) (11/2020)  % Weight Change (Calculated): 21.5  Weight Adjustment: No adjustment                 BMI Category: Obese class 2 (BMI 35.0-39. 9)    Estimated Daily Nutrient Needs:  Energy Requirements Based On: Formula  Weight Used for Energy Requirements: Current (MSJ x1.2-1.3)  Energy (kcal/day): 2015-2180  Weight Used for Protein Requirements: Current (0.8-1g)  Protein (g/day):   Method Used for Fluid Requirements: 1 ml/kcal  Fluid (ml/day): 2015-2180    Nutrition Diagnosis:   No nutrition diagnosis at this time    Nutrition Interventions:   Food and/or Nutrient Delivery: Continue current diet  Nutrition Education/Counseling: No recommendations at this time  Coordination of Nutrition Care: Continue to monitor while inpatient       Goals:     Goals: PO intake 75% or greater, by next RD assessment       Nutrition Monitoring and Evaluation:   Behavioral-Environmental Outcomes: None identified  Food/Nutrient Intake Outcomes: Food and nutrient intake  Physical Signs/Symptoms Outcomes: Biochemical data, Meal time behavior, Skin, Weight    Discharge Planning:    Continue current diet    Chriss Abdi RD

## 2022-09-19 NOTE — PROGRESS NOTES
Problem: Falls - Risk of  Goal: *Absence of Falls  Description: Document Gaby Vuong Fall Risk and appropriate interventions in the flowsheet.   9/19/2022 1155 by Merlin Simmonds, RN  Outcome: Resolved/Met  9/19/2022 1155 by Merlin Simmonds, RN  Outcome: Progressing Towards Goal  Note: Fall Risk Interventions:  Mobility Interventions: Bed/chair exit alarm, OT consult for ADLs, Patient to call before getting OOB, PT Consult for mobility concerns, PT Consult for assist device competence    Mentation Interventions: Bed/chair exit alarm, More frequent rounding, Increase mobility    Medication Interventions: Bed/chair exit alarm, Patient to call before getting OOB, Teach patient to arise slowly    Elimination Interventions: Call light in reach, Patient to call for help with toileting needs           9/19/2022 0953 by Merlin Simmonds, RN  Outcome: Progressing Towards Goal  Note: Fall Risk Interventions:  Mobility Interventions: Bed/chair exit alarm, OT consult for ADLs, Patient to call before getting OOB, PT Consult for mobility concerns, PT Consult for assist device competence    Mentation Interventions: Bed/chair exit alarm, More frequent rounding, Increase mobility    Medication Interventions: Bed/chair exit alarm, Patient to call before getting OOB, Teach patient to arise slowly    Elimination Interventions: Call light in reach, Patient to call for help with toileting needs              Problem: Patient Education: Go to Patient Education Activity  Goal: Patient/Family Education  9/19/2022 1155 by Merlin Simmonds, RN  Outcome: Resolved/Met  9/19/2022 1155 by Merlin Simmonds, RN  Outcome: Progressing Towards Goal  9/19/2022 0953 by Merlin Simmonds, RN  Outcome: Progressing Towards Goal     Problem: General Medical Care Plan  Goal: *Vital signs within specified parameters  9/19/2022 1155 by Merlin Simmonds, RN  Outcome: Resolved/Met  9/19/2022 1155 by Merlin Simmonds, RN  Outcome: Progressing Towards Goal  9/19/2022 0953 by Spencer Sierra RN  Outcome: Progressing Towards Goal  Goal: *Labs within defined limits  9/19/2022 1155 by Spencer Sierra RN  Outcome: Resolved/Met  9/19/2022 1155 by Spencer Sierra RN  Outcome: Progressing Towards Goal  9/19/2022 0953 by Spencer Sierra RN  Outcome: Progressing Towards Goal  Goal: *Absence of infection signs and symptoms  9/19/2022 1155 by Spencer Sierra RN  Outcome: Resolved/Met  9/19/2022 1155 by Spencer Sierra RN  Outcome: Progressing Towards Goal  9/19/2022 0953 by Spencer Sierra RN  Outcome: Progressing Towards Goal  Goal: *Optimal pain control at patient's stated goal  9/19/2022 1155 by Spencer Sierra RN  Outcome: Resolved/Met  9/19/2022 1155 by Spencer Sierra RN  Outcome: Progressing Towards Goal  9/19/2022 0953 by Spencer Sierra RN  Outcome: Progressing Towards Goal  Goal: *Skin integrity maintained  9/19/2022 1155 by Spencer Sierra RN  Outcome: Resolved/Met  9/19/2022 1155 by Spencer Sierra RN  Outcome: Progressing Towards Goal  9/19/2022 0953 by Spencer Sierra RN  Outcome: Progressing Towards Goal  Goal: *Fluid volume balance  9/19/2022 1155 by Spencer Sierra RN  Outcome: Resolved/Met  9/19/2022 1155 by Spencer Sierra RN  Outcome: Progressing Towards Goal  9/19/2022 0953 by Spencer Sierra RN  Outcome: Progressing Towards Goal  Goal: *Optimize nutritional status  9/19/2022 1155 by Spencer Sierra RN  Outcome: Resolved/Met  9/19/2022 1155 by Spencer Sierra RN  Outcome: Progressing Towards Goal  9/19/2022 0953 by Spencer Sierra RN  Outcome: Progressing Towards Goal  Goal: *Anxiety reduced or absent  9/19/2022 1155 by Spencer Sierra RN  Outcome: Resolved/Met  9/19/2022 1155 by Spencer Sierra RN  Outcome: Progressing Towards Goal  9/19/2022 0953 by Spencer Sierra RN  Outcome: Progressing Towards Goal  Goal: *Progressive mobility and function (eg: ADL's)  9/19/2022 1155 by Spencer Sierra, RN  Outcome: Resolved/Met  9/19/2022 1155 by Spencer Sierra, RN  Outcome: Progressing Towards Goal  9/19/2022 0953 by Josep Moralez RN  Outcome: Progressing Towards Goal     Problem: Pressure Injury - Risk of  Goal: *Prevention of pressure injury  Description: Document Keith Scale and appropriate interventions in the flowsheet. 9/19/2022 1155 by Josep Moralez RN  Outcome: Resolved/Met  9/19/2022 1155 by Josep Moralez RN  Outcome: Progressing Towards Goal  Note: Pressure Injury Interventions: Activity Interventions: Pressure redistribution bed/mattress(bed type), PT/OT evaluation    Mobility Interventions: HOB 30 degrees or less, Pressure redistribution bed/mattress (bed type), PT/OT evaluation    Nutrition Interventions: Document food/fluid/supplement intake    Friction and Shear Interventions: Minimize layers             9/19/2022 0953 by Josep Moralez RN  Outcome: Progressing Towards Goal  Note: Pressure Injury Interventions:             Activity Interventions: Pressure redistribution bed/mattress(bed type), PT/OT evaluation    Mobility Interventions: HOB 30 degrees or less, Pressure redistribution bed/mattress (bed type), PT/OT evaluation    Nutrition Interventions: Document food/fluid/supplement intake    Friction and Shear Interventions: Minimize layers                Problem: Patient Education: Go to Patient Education Activity  Goal: Patient/Family Education  9/19/2022 1155 by Josep Moralez RN  Outcome: Resolved/Met  9/19/2022 1155 by Josep Moralez RN  Outcome: Progressing Towards Goal  9/19/2022 0953 by Josep Moralez RN  Outcome: Progressing Towards Goal     Problem: Patient Education: Go to Patient Education Activity  Goal: Patient/Family Education  9/19/2022 1155 by Josep Moralez RN  Outcome: Resolved/Met  9/19/2022 1155 by Josep Moralez RN  Outcome: Progressing Towards Goal  9/19/2022 0953 by Josep Moralez RN  Outcome: Progressing Towards Goal     Problem: Patient Education: Go to Patient Education Activity  Goal: Patient/Family Education  9/19/2022 1155 by Morgan Cabral RN  Outcome: Resolved/Met  9/19/2022 1155 by Morgan Cabral RN  Outcome: Progressing Towards Goal  9/19/2022 0953 by Morgan Cabral RN  Outcome: Progressing Towards Goal

## 2022-09-19 NOTE — PROGRESS NOTES
conducted a Follow up consultation and Spiritual Assessment for Eli Frey, who is a 40 y.o.,female. The  provided the following Interventions:  Continued the relationship of care and support. Listened empathically. Offered assurance of  prayer on patient's behalf. Chart reviewed. The following outcomes were achieved:  Patient expressed gratitude for pastoral care visit. Assessment:  There are no further spiritual or Restoration issues which require Spiritual Care Services interventions at this time. Plan:  Chaplains will continue to follow and will provide pastoral care on an as needed/requested basis.  recommends bedside caregivers page  on duty if patient shows signs of acute spiritual or emotional distress. Rev.  Eduard Bolton,Certified [employer] [employer] Advance Care   Coordinator [place]  [phone]

## 2022-09-20 ENCOUNTER — TRANSCRIBE ORDER (OUTPATIENT)
Dept: SCHEDULING | Age: 47
End: 2022-09-20

## 2022-09-20 DIAGNOSIS — S37.29XD: Primary | ICD-10-CM

## 2022-09-20 LAB
BACTERIA SPEC CULT: NORMAL
SERVICE CMNT-IMP: NORMAL

## 2022-10-05 ENCOUNTER — HOSPITAL ENCOUNTER (OUTPATIENT)
Dept: GENERAL RADIOLOGY | Age: 47
Discharge: HOME | End: 2022-10-05
Attending: UROLOGY
Payer: COMMERCIAL

## 2022-10-05 DIAGNOSIS — S37.29XD: ICD-10-CM

## 2022-10-05 PROCEDURE — 74430 CONTRAST X-RAY BLADDER: CPT

## 2022-10-05 PROCEDURE — 74011000636 HC RX REV CODE- 636: Performed by: UROLOGY

## 2022-10-05 RX ADMIN — IOTHALAMATE MEGLUMINE 300 ML: 172 INJECTION URETERAL at 11:26

## (undated) DEVICE — TOTAL TRAY, DB, 100% SILI FOLEY, 16FR 10: Brand: MEDLINE

## (undated) DEVICE — SUTURE VCRL + SZ 3-0 L36IN ABSRB UD L36MM CT-1 1/2 CIR VCP944H

## (undated) DEVICE — SOL IRRIGATION INJ NACL 0.9% 500ML BTL

## (undated) DEVICE — MINOR: Brand: MEDLINE INDUSTRIES, INC.

## (undated) DEVICE — SUT VCRL + 2-0 36IN CT1 UD --

## (undated) DEVICE — GLOVE SURG SZ 6 THK91MIL LTX FREE SYN POLYISOPRENE ANTI

## (undated) DEVICE — STERILE POLYISOPRENE POWDER-FREE SURGICAL GLOVES WITH EMOLLIENT COATING: Brand: PROTEXIS

## (undated) DEVICE — SUTURE PDS + SZ 1 L96IN ABSRB VLT L65MM TP-1 1/2 CIR PDP880G

## (undated) DEVICE — GARMENT,MEDLINE,DVT,INT,CALF,MED, GEN2: Brand: MEDLINE

## (undated) DEVICE — GOWN,AURORA,NONRNF,XL,30/CS: Brand: MEDLINE

## (undated) DEVICE — STAPLER SKIN H3.9MM WIRE DIA0.58MM CRWN 6.9MM 35 STPL ROT

## (undated) DEVICE — GLOVE SURG SZ 65 THK91MIL LTX FREE SYN POLYISOPRENE

## (undated) DEVICE — Y-TYPE TUR/BLADDER IRRIGATION SET, REGULATING CLAMP

## (undated) DEVICE — SUTURE VCRL SZ 2-0 L27IN ABSRB VLT L26MM SH 1/2 CIR J317H

## (undated) DEVICE — GAUZE,SPONGE,8"X4",12PLY,XRAY,STRL,LF: Brand: MEDLINE

## (undated) DEVICE — GDWIRE UROL STR 150CM FLX TP -- BX/5 SENSOR

## (undated) DEVICE — PREP SKN CHLRAPRP APL 26ML STR --

## (undated) DEVICE — Device

## (undated) DEVICE — WOUND RETRACTOR AND PROTECTOR: Brand: ALEXIS WOUND PROTECTOR-RETRACTOR

## (undated) DEVICE — BARRIER TISS ADH ABSRB 3X4IN -- GYNECARE INTERCEED

## (undated) DEVICE — PREMIUM WET SKIN PREP TRAY: Brand: MEDLINE INDUSTRIES, INC.

## (undated) DEVICE — SPONGE LAP 18X18IN STRL -- 5/PK

## (undated) DEVICE — TOWEL,OR,DSP,ST,BLUE,STD,4/PK,20PK/CS: Brand: MEDLINE

## (undated) DEVICE — PAD,ABDOMINAL,5"X9",ST,LF,25/BX: Brand: MEDLINE INDUSTRIES, INC.

## (undated) DEVICE — SUTURE PERMAHAND SZ 2-0 L30IN NONABSORBABLE BLK SILK W/O A305H

## (undated) DEVICE — RESERVOIR,SUCTION,100CC,SILICONE: Brand: MEDLINE

## (undated) DEVICE — SUTURE VCRL SZ 0 L54IN ABSRB UD POLYGLACTIN 910 COAT BRAID J608H

## (undated) DEVICE — 3-0 COATED VICRYL PLUS UNDYED 1X27" SH --

## (undated) DEVICE — SUTURE VCRL 5-0 PS-3 PRECIS PNT REV CUT 3/8 CIR 16MM 18IN J500G

## (undated) DEVICE — SUTURE VCRL + SZ 4-0 L27IN ABSRB UD PS-2 3/8 CIR REV CUT VCP426H

## (undated) DEVICE — SUTURE COAT VCRL SZ 4-0 L18IN ABSRB UD L19MM PS-2 1/2 CIR J496G

## (undated) DEVICE — STERILE POLYISOPRENE POWDER-FREE SURGICAL GLOVES: Brand: PROTEXIS

## (undated) DEVICE — SUT MCRYL + 3-0 27IN PS1 UD --

## (undated) DEVICE — SYRINGE IRRIG 60ML SFT PLIABLE BLB EZ TO GRP 1 HND USE W/

## (undated) DEVICE — SUTURE PERMAHAND SZ 3-0 L18IN NONABSORBABLE BLK L26MM SH C013D

## (undated) DEVICE — SUT SLK 2-0SH 30IN BLK --

## (undated) DEVICE — SUT SLK 3-0 30IN SH BLK --

## (undated) DEVICE — SPONGE GZ W4XL4IN COT 12 PLY TYP VII WVN C FLD DSGN

## (undated) DEVICE — TOWEL: OR BLU 80/CS: Brand: MEDICAL ACTION INDUSTRIES

## (undated) DEVICE — SUT ETHLN 3-0 18IN PS1 BLK --

## (undated) DEVICE — DRAPE FLD WRM W44XL66IN C6L FOR INTRATEMP SYS THERMABASIN

## (undated) DEVICE — DRAIN SURG W10XL20CM SIL SMOOTH FLAT 3/4 PERF DBL WRP

## (undated) DEVICE — MATERNITY PAD,HEAVY: Brand: CURITY

## (undated) DEVICE — YANKAUER,TAPERED BULBOUS TIP,W/O VENT: Brand: MEDLINE

## (undated) DEVICE — SUT VCRL + 0 36IN CT1 UD --